# Patient Record
Sex: FEMALE | Race: WHITE | NOT HISPANIC OR LATINO | URBAN - METROPOLITAN AREA
[De-identification: names, ages, dates, MRNs, and addresses within clinical notes are randomized per-mention and may not be internally consistent; named-entity substitution may affect disease eponyms.]

---

## 2015-04-22 RX ORDER — ATORVASTATIN CALCIUM 80 MG/1
1 TABLET, FILM COATED ORAL
Qty: 0 | Refills: 0 | DISCHARGE
Start: 2015-04-22

## 2015-11-20 RX ORDER — METFORMIN HYDROCHLORIDE 850 MG/1
2 TABLET ORAL
Qty: 0 | Refills: 0 | COMMUNITY
Start: 2015-11-20

## 2017-02-15 ENCOUNTER — OUTPATIENT (OUTPATIENT)
Dept: OUTPATIENT SERVICES | Facility: HOSPITAL | Age: 67
LOS: 1 days | End: 2017-02-15
Payer: COMMERCIAL

## 2017-02-15 DIAGNOSIS — Z98.49 CATARACT EXTRACTION STATUS, UNSPECIFIED EYE: Chronic | ICD-10-CM

## 2017-02-15 DIAGNOSIS — Z90.49 ACQUIRED ABSENCE OF OTHER SPECIFIED PARTS OF DIGESTIVE TRACT: Chronic | ICD-10-CM

## 2017-02-15 DIAGNOSIS — Z98.89 OTHER SPECIFIED POSTPROCEDURAL STATES: Chronic | ICD-10-CM

## 2017-02-15 DIAGNOSIS — M54.5 LOW BACK PAIN: ICD-10-CM

## 2017-07-28 PROCEDURE — 97140 MANUAL THERAPY 1/> REGIONS: CPT

## 2017-07-28 PROCEDURE — 97010 HOT OR COLD PACKS THERAPY: CPT

## 2017-07-28 PROCEDURE — 97110 THERAPEUTIC EXERCISES: CPT

## 2017-08-01 ENCOUNTER — OUTPATIENT (OUTPATIENT)
Dept: OUTPATIENT SERVICES | Facility: HOSPITAL | Age: 67
LOS: 1 days | End: 2017-08-01
Payer: COMMERCIAL

## 2017-08-01 DIAGNOSIS — Z98.89 OTHER SPECIFIED POSTPROCEDURAL STATES: Chronic | ICD-10-CM

## 2017-08-01 DIAGNOSIS — Z98.49 CATARACT EXTRACTION STATUS, UNSPECIFIED EYE: Chronic | ICD-10-CM

## 2017-08-01 DIAGNOSIS — Z90.49 ACQUIRED ABSENCE OF OTHER SPECIFIED PARTS OF DIGESTIVE TRACT: Chronic | ICD-10-CM

## 2017-08-15 PROCEDURE — 97010 HOT OR COLD PACKS THERAPY: CPT

## 2017-08-15 PROCEDURE — 97140 MANUAL THERAPY 1/> REGIONS: CPT

## 2017-08-22 DIAGNOSIS — M54.5 LOW BACK PAIN: ICD-10-CM

## 2017-12-04 ENCOUNTER — OUTPATIENT (OUTPATIENT)
Dept: OUTPATIENT SERVICES | Facility: HOSPITAL | Age: 67
LOS: 1 days | Discharge: HOME | End: 2017-12-04
Payer: MEDICARE

## 2017-12-04 DIAGNOSIS — Z90.49 ACQUIRED ABSENCE OF OTHER SPECIFIED PARTS OF DIGESTIVE TRACT: Chronic | ICD-10-CM

## 2017-12-04 DIAGNOSIS — Z98.49 CATARACT EXTRACTION STATUS, UNSPECIFIED EYE: Chronic | ICD-10-CM

## 2017-12-04 DIAGNOSIS — Z98.89 OTHER SPECIFIED POSTPROCEDURAL STATES: Chronic | ICD-10-CM

## 2017-12-04 DIAGNOSIS — H26.491 OTHER SECONDARY CATARACT, RIGHT EYE: ICD-10-CM

## 2017-12-04 PROCEDURE — 66821 AFTER CATARACT LASER SURGERY: CPT | Mod: RT

## 2018-12-04 ENCOUNTER — APPOINTMENT (OUTPATIENT)
Dept: MAMMOGRAPHY | Facility: CLINIC | Age: 68
End: 2018-12-04
Payer: MEDICARE

## 2018-12-04 ENCOUNTER — OUTPATIENT (OUTPATIENT)
Dept: OUTPATIENT SERVICES | Facility: HOSPITAL | Age: 68
LOS: 1 days | End: 2018-12-04
Payer: MEDICARE

## 2018-12-04 ENCOUNTER — APPOINTMENT (OUTPATIENT)
Dept: RADIOLOGY | Facility: CLINIC | Age: 68
End: 2018-12-04
Payer: MEDICARE

## 2018-12-04 DIAGNOSIS — Z12.31 ENCOUNTER FOR SCREENING MAMMOGRAM FOR MALIGNANT NEOPLASM OF BREAST: ICD-10-CM

## 2018-12-04 DIAGNOSIS — Z98.49 CATARACT EXTRACTION STATUS, UNSPECIFIED EYE: Chronic | ICD-10-CM

## 2018-12-04 DIAGNOSIS — Z90.49 ACQUIRED ABSENCE OF OTHER SPECIFIED PARTS OF DIGESTIVE TRACT: Chronic | ICD-10-CM

## 2018-12-04 DIAGNOSIS — Z98.89 OTHER SPECIFIED POSTPROCEDURAL STATES: Chronic | ICD-10-CM

## 2018-12-04 PROCEDURE — 77063 BREAST TOMOSYNTHESIS BI: CPT | Mod: 26

## 2018-12-04 PROCEDURE — 77080 DXA BONE DENSITY AXIAL: CPT | Mod: 26

## 2018-12-04 PROCEDURE — 77067 SCR MAMMO BI INCL CAD: CPT | Mod: 26

## 2018-12-04 PROCEDURE — 77080 DXA BONE DENSITY AXIAL: CPT

## 2018-12-04 PROCEDURE — 77067 SCR MAMMO BI INCL CAD: CPT

## 2018-12-04 PROCEDURE — 77063 BREAST TOMOSYNTHESIS BI: CPT

## 2019-02-01 ENCOUNTER — OUTPATIENT (OUTPATIENT)
Dept: OUTPATIENT SERVICES | Facility: HOSPITAL | Age: 69
LOS: 1 days | End: 2019-02-01
Payer: MEDICARE

## 2019-02-01 VITALS
WEIGHT: 136.91 LBS | SYSTOLIC BLOOD PRESSURE: 169 MMHG | TEMPERATURE: 99 F | DIASTOLIC BLOOD PRESSURE: 81 MMHG | RESPIRATION RATE: 18 BRPM | HEART RATE: 90 BPM | HEIGHT: 61 IN | OXYGEN SATURATION: 97 %

## 2019-02-01 DIAGNOSIS — Z98.89 OTHER SPECIFIED POSTPROCEDURAL STATES: Chronic | ICD-10-CM

## 2019-02-01 DIAGNOSIS — Z90.49 ACQUIRED ABSENCE OF OTHER SPECIFIED PARTS OF DIGESTIVE TRACT: Chronic | ICD-10-CM

## 2019-02-01 DIAGNOSIS — Z98.49 CATARACT EXTRACTION STATUS, UNSPECIFIED EYE: Chronic | ICD-10-CM

## 2019-02-01 DIAGNOSIS — Z01.810 ENCOUNTER FOR PREPROCEDURAL CARDIOVASCULAR EXAMINATION: ICD-10-CM

## 2019-02-01 LAB
ANION GAP SERPL CALC-SCNC: 14 MMOL/L — SIGNIFICANT CHANGE UP (ref 5–17)
APTT BLD: 32.8 SEC — SIGNIFICANT CHANGE UP (ref 27.5–36.3)
BASOPHILS # BLD AUTO: 0 K/UL — SIGNIFICANT CHANGE UP (ref 0–0.2)
BASOPHILS NFR BLD AUTO: 0.2 % — SIGNIFICANT CHANGE UP (ref 0–2)
BUN SERPL-MCNC: 18 MG/DL — SIGNIFICANT CHANGE UP (ref 8–20)
CALCIUM SERPL-MCNC: 9.6 MG/DL — SIGNIFICANT CHANGE UP (ref 8.6–10.2)
CHLORIDE SERPL-SCNC: 100 MMOL/L — SIGNIFICANT CHANGE UP (ref 98–107)
CO2 SERPL-SCNC: 28 MMOL/L — SIGNIFICANT CHANGE UP (ref 22–29)
CREAT SERPL-MCNC: 0.85 MG/DL — SIGNIFICANT CHANGE UP (ref 0.5–1.3)
EOSINOPHIL # BLD AUTO: 0.1 K/UL — SIGNIFICANT CHANGE UP (ref 0–0.5)
EOSINOPHIL NFR BLD AUTO: 1.7 % — SIGNIFICANT CHANGE UP (ref 0–6)
GLUCOSE SERPL-MCNC: 204 MG/DL — HIGH (ref 70–115)
HCT VFR BLD CALC: 32.6 % — LOW (ref 37–47)
HGB BLD-MCNC: 10.9 G/DL — LOW (ref 12–16)
INR BLD: 0.89 RATIO — SIGNIFICANT CHANGE UP (ref 0.88–1.16)
LYMPHOCYTES # BLD AUTO: 2.2 K/UL — SIGNIFICANT CHANGE UP (ref 1–4.8)
LYMPHOCYTES # BLD AUTO: 33.2 % — SIGNIFICANT CHANGE UP (ref 20–55)
MCHC RBC-ENTMCNC: 30 PG — SIGNIFICANT CHANGE UP (ref 27–31)
MCHC RBC-ENTMCNC: 33.4 G/DL — SIGNIFICANT CHANGE UP (ref 32–36)
MCV RBC AUTO: 89.8 FL — SIGNIFICANT CHANGE UP (ref 81–99)
MONOCYTES # BLD AUTO: 0.6 K/UL — SIGNIFICANT CHANGE UP (ref 0–0.8)
MONOCYTES NFR BLD AUTO: 9.6 % — SIGNIFICANT CHANGE UP (ref 3–10)
NEUTROPHILS # BLD AUTO: 3.7 K/UL — SIGNIFICANT CHANGE UP (ref 1.8–8)
NEUTROPHILS NFR BLD AUTO: 55.1 % — SIGNIFICANT CHANGE UP (ref 37–73)
PLATELET # BLD AUTO: 254 K/UL — SIGNIFICANT CHANGE UP (ref 150–400)
POTASSIUM SERPL-MCNC: 3.9 MMOL/L — SIGNIFICANT CHANGE UP (ref 3.5–5.3)
POTASSIUM SERPL-SCNC: 3.9 MMOL/L — SIGNIFICANT CHANGE UP (ref 3.5–5.3)
PROTHROM AB SERPL-ACNC: 10.2 SEC — SIGNIFICANT CHANGE UP (ref 10–12.9)
RBC # BLD: 3.63 M/UL — LOW (ref 4.4–5.2)
RBC # FLD: 12.4 % — SIGNIFICANT CHANGE UP (ref 11–15.6)
SODIUM SERPL-SCNC: 142 MMOL/L — SIGNIFICANT CHANGE UP (ref 135–145)
WBC # BLD: 6.6 K/UL — SIGNIFICANT CHANGE UP (ref 4.8–10.8)
WBC # FLD AUTO: 6.6 K/UL — SIGNIFICANT CHANGE UP (ref 4.8–10.8)

## 2019-02-01 PROCEDURE — 36415 COLL VENOUS BLD VENIPUNCTURE: CPT

## 2019-02-01 PROCEDURE — 85610 PROTHROMBIN TIME: CPT

## 2019-02-01 PROCEDURE — 93005 ELECTROCARDIOGRAM TRACING: CPT

## 2019-02-01 PROCEDURE — 93010 ELECTROCARDIOGRAM REPORT: CPT

## 2019-02-01 PROCEDURE — G0463: CPT

## 2019-02-01 PROCEDURE — 85730 THROMBOPLASTIN TIME PARTIAL: CPT

## 2019-02-01 PROCEDURE — 85027 COMPLETE CBC AUTOMATED: CPT

## 2019-02-01 PROCEDURE — 80048 BASIC METABOLIC PNL TOTAL CA: CPT

## 2019-02-01 NOTE — ASU PATIENT PROFILE, ADULT - PSH
S/P appendectomy    S/P cholecystectomy    S/P tonsillectomy    Status post cataract extraction  left eye 8/3/2015

## 2019-02-01 NOTE — H&P PST ADULT - HISTORY OF PRESENT ILLNESS
This is a 67 y/o female with h/o DM, HPL, HTN, medication induced pancreatitis with recent new RBBB on EKG and sent for cardiac work up by PMD.  She denies chest pain, SOB.  Her NST revealed normal myocardial perfusion imaging but showed ischemia on the EKG in the apical, inferior, and lateral leads during stress and recovery.  EF = 71%.  As a result, patient presents today for PST for LHC to r/o obstructive CAD.      TTE (11/7/2018): Normal LV EF 60-65%; mild to mod MR; mild TR    ASA 2  Mallampati 2  GFR 70  Bleeding risk 3.4%

## 2019-02-01 NOTE — H&P PST ADULT - PMH
Diabetes    Endogenous hyperlipemia    Herniated lumbar intervertebral disc    HTN (hypertension)    Pancreatitis

## 2019-02-07 ENCOUNTER — TRANSCRIPTION ENCOUNTER (OUTPATIENT)
Age: 69
End: 2019-02-07

## 2019-02-07 ENCOUNTER — OUTPATIENT (OUTPATIENT)
Dept: OUTPATIENT SERVICES | Facility: HOSPITAL | Age: 69
LOS: 1 days | End: 2019-02-07
Payer: MEDICARE

## 2019-02-07 VITALS — DIASTOLIC BLOOD PRESSURE: 79 MMHG | RESPIRATION RATE: 18 BRPM | HEART RATE: 70 BPM | SYSTOLIC BLOOD PRESSURE: 176 MMHG

## 2019-02-07 VITALS
HEART RATE: 69 BPM | SYSTOLIC BLOOD PRESSURE: 186 MMHG | TEMPERATURE: 98 F | OXYGEN SATURATION: 99 % | DIASTOLIC BLOOD PRESSURE: 91 MMHG | RESPIRATION RATE: 18 BRPM

## 2019-02-07 DIAGNOSIS — Z98.49 CATARACT EXTRACTION STATUS, UNSPECIFIED EYE: Chronic | ICD-10-CM

## 2019-02-07 DIAGNOSIS — Z90.49 ACQUIRED ABSENCE OF OTHER SPECIFIED PARTS OF DIGESTIVE TRACT: Chronic | ICD-10-CM

## 2019-02-07 DIAGNOSIS — Z01.810 ENCOUNTER FOR PREPROCEDURAL CARDIOVASCULAR EXAMINATION: ICD-10-CM

## 2019-02-07 DIAGNOSIS — Z98.89 OTHER SPECIFIED POSTPROCEDURAL STATES: Chronic | ICD-10-CM

## 2019-02-07 DIAGNOSIS — R07.9 CHEST PAIN, UNSPECIFIED: ICD-10-CM

## 2019-02-07 LAB — GLUCOSE BLDC GLUCOMTR-MCNC: 249 MG/DL — HIGH (ref 70–99)

## 2019-02-07 PROCEDURE — 93458 L HRT ARTERY/VENTRICLE ANGIO: CPT

## 2019-02-07 PROCEDURE — C1894: CPT

## 2019-02-07 PROCEDURE — C1769: CPT

## 2019-02-07 PROCEDURE — 82962 GLUCOSE BLOOD TEST: CPT

## 2019-02-07 PROCEDURE — C1887: CPT

## 2019-02-07 PROCEDURE — 99153 MOD SED SAME PHYS/QHP EA: CPT

## 2019-02-07 PROCEDURE — 99152 MOD SED SAME PHYS/QHP 5/>YRS: CPT

## 2019-02-07 PROCEDURE — C1760: CPT

## 2019-02-07 RX ORDER — SODIUM CHLORIDE 9 MG/ML
1000 INJECTION INTRAMUSCULAR; INTRAVENOUS; SUBCUTANEOUS
Qty: 0 | Refills: 0 | Status: DISCONTINUED | OUTPATIENT
Start: 2019-02-07 | End: 2019-02-22

## 2019-02-07 RX ADMIN — SODIUM CHLORIDE 30 MILLILITER(S): 9 INJECTION INTRAMUSCULAR; INTRAVENOUS; SUBCUTANEOUS at 18:00

## 2019-02-07 RX ADMIN — SODIUM CHLORIDE 30 MILLILITER(S): 9 INJECTION INTRAMUSCULAR; INTRAVENOUS; SUBCUTANEOUS at 16:20

## 2019-02-07 RX ADMIN — SODIUM CHLORIDE 30 MILLILITER(S): 9 INJECTION INTRAMUSCULAR; INTRAVENOUS; SUBCUTANEOUS at 17:14

## 2019-02-07 NOTE — DISCHARGE NOTE ADULT - CARE PLAN
Principal Discharge DX:	Abnormal stress test  Goal:	ADL without pain  Assessment and plan of treatment:	No heavy lifting, driving, sex, tub baths, swimming, or any activity that submerges the lower half of the body in water for 48 hours.  Limited walking and stairs for 48 hours.    Change the bandaid after 24 hours and every 24 hours after that.  Keep the puncture site dry and covered with a bandaid until a scab forms.    Observe the site frequently.  If bleeding or a large lump (the size of a golf ball or bigger) occurs lie flat, apply continuous direct pressure just above the puncture site for at least 10 minutes, and notify your physician immediately.  If the bleeding cannot be controlled, call 911 immediately for assistance.  Notify your physician of pain, swelling or any drainage.    Notify your physician immediately if coldness, numbness, discoloration or pain in your foot occurs.

## 2019-02-07 NOTE — DISCHARGE NOTE ADULT - HOSPITAL COURSE
This is a 67 y/o female with h/o DM, HPL, HTN, medication induced pancreatitis with recent new RBBB on EKG and sent for cardiac work up by PMD.  She denies chest pain, SOB.  Her NST revealed normal myocardial perfusion imaging but showed ischemia on the EKG in the apical, inferior, and lateral leads during stress and recovery.  EF = 71%.  As a result, patient presents today for PST for C to r/o obstructive CAD.      TTE (11/7/2018): Normal LV EF 60-65%; mild to mod MR; mild TR  S/P cath  Non obstructive CAD  Plan d/c home  Follow up with outpatient in 7-10 days  Continue medications as prior.

## 2019-02-07 NOTE — DISCHARGE NOTE ADULT - PATIENT PORTAL LINK FT
You can access the [email or web address], offered by [facility], by registering with the following website: [email or web address]

## 2019-02-07 NOTE — DISCHARGE NOTE ADULT - MEDICATION SUMMARY - MEDICATIONS TO TAKE
I will START or STAY ON the medications listed below when I get home from the hospital:    aspirin 81 mg oral tablet  -- 1 tab(s) by mouth once a day  -- Indication: For Chest pain    oxyCODONE 5 mg oral tablet  -- 1 tab(s) by mouth 2 times a day, As Needed  -- Indication: For pain    ramipril 5 mg oral capsule  -- 1 cap(s) by mouth once a day  -- Indication: For HTN    metFORMIN 500 mg oral tablet  -- 2 tab(s) by mouth 2 times a day (with meals)  -- Indication: For DM resume in 2 days    glipiZIDE 5 mg oral tablet, extended release  -- 1 tab(s) by mouth 2 times a day  -- Indication: For DM    atorvastatin 20 mg oral tablet  -- 1 tab(s) by mouth once a day  -- Indication: For HLD    ALPRAZolam 0.25 mg oral tablet  -- 1 tab(s) by mouth once a day (at bedtime), As Needed  -- Indication: For anxiety    Toprol-XL  -- 75 milligram(s) by mouth once a day  -- Indication: For HTN    Restasis 0.05% ophthalmic emulsion  -- 1 drop(s) to each affected eye every 12 hours  -- Indication: For EYE gtts

## 2019-02-07 NOTE — DISCHARGE NOTE ADULT - CARE PROVIDER_API CALL
Misha Mckeon)  Cardiovascular Disease; Interventional Cardiology  [address] [place] [address]  Phone: [phone]  Fax: [phone]  Follow Up Time:

## 2019-02-07 NOTE — DISCHARGE NOTE ADULT - NS AS ACTIVITY OBS
No Heavy lifting/straining/Sex allowed/Stairs allowed/Walking-Indoors allowed/Showering allowed/Walking-Outdoors allowed/Do not make important decisions/Do not drive or operate machinery

## 2019-02-07 NOTE — DISCHARGE NOTE ADULT - PLAN OF CARE
ADL without pain No heavy lifting, driving, sex, tub baths, swimming, or any activity that submerges the lower half of the body in water for 48 hours.  Limited walking and stairs for 48 hours.    Change the bandaid after 24 hours and every 24 hours after that.  Keep the puncture site dry and covered with a bandaid until a scab forms.    Observe the site frequently.  If bleeding or a large lump (the size of a golf ball or bigger) occurs lie flat, apply continuous direct pressure just above the puncture site for at least 10 minutes, and notify your physician immediately.  If the bleeding cannot be controlled, call 911 immediately for assistance.  Notify your physician of pain, swelling or any drainage.    Notify your physician immediately if coldness, numbness, discoloration or pain in your foot occurs.

## 2019-04-10 ENCOUNTER — INPATIENT (INPATIENT)
Facility: HOSPITAL | Age: 69
LOS: 4 days | Discharge: HOME | DRG: 871 | End: 2019-04-15
Attending: SURGERY | Admitting: SURGERY
Payer: MEDICARE

## 2019-04-10 VITALS
TEMPERATURE: 98 F | HEIGHT: 61 IN | SYSTOLIC BLOOD PRESSURE: 177 MMHG | DIASTOLIC BLOOD PRESSURE: 68 MMHG | RESPIRATION RATE: 22 BRPM | WEIGHT: 130.07 LBS | OXYGEN SATURATION: 99 % | HEART RATE: 104 BPM

## 2019-04-10 DIAGNOSIS — Z98.49 CATARACT EXTRACTION STATUS, UNSPECIFIED EYE: Chronic | ICD-10-CM

## 2019-04-10 DIAGNOSIS — A41.9 SEPSIS, UNSPECIFIED ORGANISM: ICD-10-CM

## 2019-04-10 DIAGNOSIS — Z98.89 OTHER SPECIFIED POSTPROCEDURAL STATES: Chronic | ICD-10-CM

## 2019-04-10 DIAGNOSIS — Z90.49 ACQUIRED ABSENCE OF OTHER SPECIFIED PARTS OF DIGESTIVE TRACT: Chronic | ICD-10-CM

## 2019-04-10 PROBLEM — K85.90 ACUTE PANCREATITIS WITHOUT NECROSIS OR INFECTION, UNSPECIFIED: Chronic | Status: ACTIVE | Noted: 2019-02-01

## 2019-04-10 LAB
ALBUMIN SERPL ELPH-MCNC: 4.3 G/DL — SIGNIFICANT CHANGE UP (ref 3.3–5.2)
ALP SERPL-CCNC: 158 U/L — HIGH (ref 40–120)
ALT FLD-CCNC: 156 U/L — HIGH
ANION GAP SERPL CALC-SCNC: 19 MMOL/L — HIGH (ref 5–17)
APPEARANCE UR: CLEAR — SIGNIFICANT CHANGE UP
AST SERPL-CCNC: 260 U/L — HIGH
BACTERIA # UR AUTO: ABNORMAL
BILIRUB SERPL-MCNC: 1.2 MG/DL — SIGNIFICANT CHANGE UP (ref 0.4–2)
BILIRUB UR-MCNC: NEGATIVE — SIGNIFICANT CHANGE UP
BLD GP AB SCN SERPL QL: SIGNIFICANT CHANGE UP
BUN SERPL-MCNC: 23 MG/DL — HIGH (ref 8–20)
CALCIUM SERPL-MCNC: 10.2 MG/DL — SIGNIFICANT CHANGE UP (ref 8.6–10.2)
CHLORIDE SERPL-SCNC: 100 MMOL/L — SIGNIFICANT CHANGE UP (ref 98–107)
CO2 SERPL-SCNC: 24 MMOL/L — SIGNIFICANT CHANGE UP (ref 22–29)
COLOR SPEC: YELLOW — SIGNIFICANT CHANGE UP
CREAT SERPL-MCNC: 0.89 MG/DL — SIGNIFICANT CHANGE UP (ref 0.5–1.3)
DIFF PNL FLD: ABNORMAL
EPI CELLS # UR: NEGATIVE — SIGNIFICANT CHANGE UP
GLUCOSE BLDC GLUCOMTR-MCNC: 180 MG/DL — HIGH (ref 70–99)
GLUCOSE SERPL-MCNC: 222 MG/DL — HIGH (ref 70–115)
GLUCOSE UR QL: 100 MG/DL
HCT VFR BLD CALC: 34.3 % — LOW (ref 37–47)
HGB BLD-MCNC: 11.6 G/DL — LOW (ref 12–16)
KETONES UR-MCNC: ABNORMAL
LACTATE BLDV-MCNC: 1 MMOL/L — SIGNIFICANT CHANGE UP (ref 0.5–2)
LACTATE BLDV-MCNC: 2.2 MMOL/L — HIGH (ref 0.5–2)
LEUKOCYTE ESTERASE UR-ACNC: NEGATIVE — SIGNIFICANT CHANGE UP
LIDOCAIN IGE QN: 2859 U/L — HIGH (ref 22–51)
LYMPHOCYTES # BLD AUTO: 5 % — LOW (ref 20–55)
MCHC RBC-ENTMCNC: 30.1 PG — SIGNIFICANT CHANGE UP (ref 27–31)
MCHC RBC-ENTMCNC: 33.8 G/DL — SIGNIFICANT CHANGE UP (ref 32–36)
MCV RBC AUTO: 88.9 FL — SIGNIFICANT CHANGE UP (ref 81–99)
METAMYELOCYTES # FLD: 1 % — HIGH (ref 0–0)
MONOCYTES NFR BLD AUTO: 7 % — SIGNIFICANT CHANGE UP (ref 3–10)
MYELOCYTES NFR BLD: 1 % — HIGH (ref 0–0)
NEUTROPHILS NFR BLD AUTO: 84 % — HIGH (ref 37–73)
NITRITE UR-MCNC: NEGATIVE — SIGNIFICANT CHANGE UP
PH UR: 5 — SIGNIFICANT CHANGE UP (ref 5–8)
PLAT MORPH BLD: NORMAL — SIGNIFICANT CHANGE UP
PLATELET # BLD AUTO: 271 K/UL — SIGNIFICANT CHANGE UP (ref 150–400)
POTASSIUM SERPL-MCNC: 3.9 MMOL/L — SIGNIFICANT CHANGE UP (ref 3.5–5.3)
POTASSIUM SERPL-SCNC: 3.9 MMOL/L — SIGNIFICANT CHANGE UP (ref 3.5–5.3)
PROT SERPL-MCNC: 7.5 G/DL — SIGNIFICANT CHANGE UP (ref 6.6–8.7)
PROT UR-MCNC: 30 MG/DL
RBC # BLD: 3.86 M/UL — LOW (ref 4.4–5.2)
RBC # FLD: 12.5 % — SIGNIFICANT CHANGE UP (ref 11–15.6)
RBC BLD AUTO: NORMAL — SIGNIFICANT CHANGE UP
RBC CASTS # UR COMP ASSIST: ABNORMAL /HPF (ref 0–4)
SODIUM SERPL-SCNC: 143 MMOL/L — SIGNIFICANT CHANGE UP (ref 135–145)
SP GR SPEC: 1.01 — SIGNIFICANT CHANGE UP (ref 1.01–1.02)
TYPE + AB SCN PNL BLD: SIGNIFICANT CHANGE UP
UROBILINOGEN FLD QL: NEGATIVE MG/DL — SIGNIFICANT CHANGE UP
VARIANT LYMPHS # BLD: 2 % — SIGNIFICANT CHANGE UP (ref 0–6)
WBC # BLD: 15.8 K/UL — HIGH (ref 4.8–10.8)
WBC # FLD AUTO: 15.8 K/UL — HIGH (ref 4.8–10.8)
WBC UR QL: SIGNIFICANT CHANGE UP

## 2019-04-10 PROCEDURE — 99221 1ST HOSP IP/OBS SF/LOW 40: CPT

## 2019-04-10 PROCEDURE — 74177 CT ABD & PELVIS W/CONTRAST: CPT | Mod: 26

## 2019-04-10 PROCEDURE — 99291 CRITICAL CARE FIRST HOUR: CPT

## 2019-04-10 PROCEDURE — 93010 ELECTROCARDIOGRAM REPORT: CPT

## 2019-04-10 PROCEDURE — 71045 X-RAY EXAM CHEST 1 VIEW: CPT | Mod: 26

## 2019-04-10 RX ORDER — HYDROMORPHONE HYDROCHLORIDE 2 MG/ML
0.5 INJECTION INTRAMUSCULAR; INTRAVENOUS; SUBCUTANEOUS EVERY 4 HOURS
Qty: 0 | Refills: 0 | Status: DISCONTINUED | OUTPATIENT
Start: 2019-04-10 | End: 2019-04-11

## 2019-04-10 RX ORDER — METOPROLOL TARTRATE 50 MG
25 TABLET ORAL
Qty: 0 | Refills: 0 | Status: DISCONTINUED | OUTPATIENT
Start: 2019-04-10 | End: 2019-04-15

## 2019-04-10 RX ORDER — LISINOPRIL 2.5 MG/1
20 TABLET ORAL DAILY
Qty: 0 | Refills: 0 | Status: DISCONTINUED | OUTPATIENT
Start: 2019-04-10 | End: 2019-04-10

## 2019-04-10 RX ORDER — SODIUM CHLORIDE 9 MG/ML
1000 INJECTION INTRAMUSCULAR; INTRAVENOUS; SUBCUTANEOUS ONCE
Qty: 0 | Refills: 0 | Status: COMPLETED | OUTPATIENT
Start: 2019-04-10 | End: 2019-04-10

## 2019-04-10 RX ORDER — SODIUM CHLORIDE 9 MG/ML
1000 INJECTION INTRAMUSCULAR; INTRAVENOUS; SUBCUTANEOUS
Qty: 0 | Refills: 0 | Status: DISCONTINUED | OUTPATIENT
Start: 2019-04-10 | End: 2019-04-10

## 2019-04-10 RX ORDER — GLUCAGON INJECTION, SOLUTION 0.5 MG/.1ML
1 INJECTION, SOLUTION SUBCUTANEOUS ONCE
Qty: 0 | Refills: 0 | Status: DISCONTINUED | OUTPATIENT
Start: 2019-04-10 | End: 2019-04-15

## 2019-04-10 RX ORDER — SODIUM CHLORIDE 9 MG/ML
1000 INJECTION, SOLUTION INTRAVENOUS
Qty: 0 | Refills: 0 | Status: DISCONTINUED | OUTPATIENT
Start: 2019-04-10 | End: 2019-04-15

## 2019-04-10 RX ORDER — INFLUENZA VIRUS VACCINE 15; 15; 15; 15 UG/.5ML; UG/.5ML; UG/.5ML; UG/.5ML
0.5 SUSPENSION INTRAMUSCULAR ONCE
Qty: 0 | Refills: 0 | Status: DISCONTINUED | OUTPATIENT
Start: 2019-04-10 | End: 2019-04-15

## 2019-04-10 RX ORDER — DEXTROSE 50 % IN WATER 50 %
15 SYRINGE (ML) INTRAVENOUS ONCE
Qty: 0 | Refills: 0 | Status: DISCONTINUED | OUTPATIENT
Start: 2019-04-10 | End: 2019-04-15

## 2019-04-10 RX ORDER — MORPHINE SULFATE 50 MG/1
4 CAPSULE, EXTENDED RELEASE ORAL ONCE
Qty: 0 | Refills: 0 | Status: DISCONTINUED | OUTPATIENT
Start: 2019-04-10 | End: 2019-04-10

## 2019-04-10 RX ORDER — ACETAMINOPHEN 500 MG
1000 TABLET ORAL ONCE
Qty: 0 | Refills: 0 | Status: COMPLETED | OUTPATIENT
Start: 2019-04-10 | End: 2019-04-10

## 2019-04-10 RX ORDER — METOPROLOL TARTRATE 50 MG
50 TABLET ORAL DAILY
Qty: 0 | Refills: 0 | Status: DISCONTINUED | OUTPATIENT
Start: 2019-04-10 | End: 2019-04-10

## 2019-04-10 RX ORDER — DEXTROSE 50 % IN WATER 50 %
12.5 SYRINGE (ML) INTRAVENOUS ONCE
Qty: 0 | Refills: 0 | Status: DISCONTINUED | OUTPATIENT
Start: 2019-04-10 | End: 2019-04-15

## 2019-04-10 RX ORDER — DIPHENHYDRAMINE HCL 50 MG
25 CAPSULE ORAL ONCE
Qty: 0 | Refills: 0 | Status: COMPLETED | OUTPATIENT
Start: 2019-04-10 | End: 2019-04-10

## 2019-04-10 RX ORDER — SODIUM CHLORIDE 9 MG/ML
3 INJECTION INTRAMUSCULAR; INTRAVENOUS; SUBCUTANEOUS ONCE
Qty: 0 | Refills: 0 | Status: COMPLETED | OUTPATIENT
Start: 2019-04-10 | End: 2019-04-10

## 2019-04-10 RX ORDER — SODIUM CHLORIDE 9 MG/ML
1000 INJECTION, SOLUTION INTRAVENOUS
Qty: 0 | Refills: 0 | Status: DISCONTINUED | OUTPATIENT
Start: 2019-04-10 | End: 2019-04-12

## 2019-04-10 RX ORDER — ATORVASTATIN CALCIUM 80 MG/1
10 TABLET, FILM COATED ORAL AT BEDTIME
Qty: 0 | Refills: 0 | Status: DISCONTINUED | OUTPATIENT
Start: 2019-04-10 | End: 2019-04-15

## 2019-04-10 RX ORDER — HYDROMORPHONE HYDROCHLORIDE 2 MG/ML
1 INJECTION INTRAMUSCULAR; INTRAVENOUS; SUBCUTANEOUS EVERY 6 HOURS
Qty: 0 | Refills: 0 | Status: DISCONTINUED | OUTPATIENT
Start: 2019-04-10 | End: 2019-04-11

## 2019-04-10 RX ORDER — FENTANYL CITRATE 50 UG/ML
25 INJECTION INTRAVENOUS ONCE
Qty: 0 | Refills: 0 | Status: DISCONTINUED | OUTPATIENT
Start: 2019-04-10 | End: 2019-04-10

## 2019-04-10 RX ORDER — DEXTROSE 50 % IN WATER 50 %
25 SYRINGE (ML) INTRAVENOUS ONCE
Qty: 0 | Refills: 0 | Status: DISCONTINUED | OUTPATIENT
Start: 2019-04-10 | End: 2019-04-15

## 2019-04-10 RX ORDER — ENOXAPARIN SODIUM 100 MG/ML
40 INJECTION SUBCUTANEOUS DAILY
Qty: 0 | Refills: 0 | Status: DISCONTINUED | OUTPATIENT
Start: 2019-04-10 | End: 2019-04-15

## 2019-04-10 RX ORDER — ERTAPENEM SODIUM 1 G/1
1000 INJECTION, POWDER, LYOPHILIZED, FOR SOLUTION INTRAMUSCULAR; INTRAVENOUS ONCE
Qty: 0 | Refills: 0 | Status: COMPLETED | OUTPATIENT
Start: 2019-04-10 | End: 2019-04-10

## 2019-04-10 RX ORDER — INSULIN LISPRO 100/ML
VIAL (ML) SUBCUTANEOUS EVERY 6 HOURS
Qty: 0 | Refills: 0 | Status: DISCONTINUED | OUTPATIENT
Start: 2019-04-10 | End: 2019-04-15

## 2019-04-10 RX ORDER — CHLORHEXIDINE GLUCONATE 213 G/1000ML
1 SOLUTION TOPICAL DAILY
Qty: 0 | Refills: 0 | Status: DISCONTINUED | OUTPATIENT
Start: 2019-04-10 | End: 2019-04-15

## 2019-04-10 RX ORDER — SODIUM CHLORIDE 9 MG/ML
2000 INJECTION INTRAMUSCULAR; INTRAVENOUS; SUBCUTANEOUS ONCE
Qty: 0 | Refills: 0 | Status: COMPLETED | OUTPATIENT
Start: 2019-04-10 | End: 2019-04-10

## 2019-04-10 RX ORDER — METOCLOPRAMIDE HCL 10 MG
10 TABLET ORAL ONCE
Qty: 0 | Refills: 0 | Status: COMPLETED | OUTPATIENT
Start: 2019-04-10 | End: 2019-04-10

## 2019-04-10 RX ADMIN — HYDROMORPHONE HYDROCHLORIDE 0.5 MILLIGRAM(S): 2 INJECTION INTRAMUSCULAR; INTRAVENOUS; SUBCUTANEOUS at 14:05

## 2019-04-10 RX ADMIN — Medication 400 MILLIGRAM(S): at 15:17

## 2019-04-10 RX ADMIN — FENTANYL CITRATE 25 MICROGRAM(S): 50 INJECTION INTRAVENOUS at 10:23

## 2019-04-10 RX ADMIN — Medication 10 MILLIGRAM(S): at 08:35

## 2019-04-10 RX ADMIN — Medication 1000 MILLIGRAM(S): at 15:32

## 2019-04-10 RX ADMIN — ERTAPENEM SODIUM 120 MILLIGRAM(S): 1 INJECTION, POWDER, LYOPHILIZED, FOR SOLUTION INTRAMUSCULAR; INTRAVENOUS at 10:05

## 2019-04-10 RX ADMIN — HYDROMORPHONE HYDROCHLORIDE 0.5 MILLIGRAM(S): 2 INJECTION INTRAMUSCULAR; INTRAVENOUS; SUBCUTANEOUS at 19:56

## 2019-04-10 RX ADMIN — FENTANYL CITRATE 25 MICROGRAM(S): 50 INJECTION INTRAVENOUS at 10:33

## 2019-04-10 RX ADMIN — MORPHINE SULFATE 4 MILLIGRAM(S): 50 CAPSULE, EXTENDED RELEASE ORAL at 07:55

## 2019-04-10 RX ADMIN — Medication 400 MILLIGRAM(S): at 09:43

## 2019-04-10 RX ADMIN — Medication 1000 MILLIGRAM(S): at 09:33

## 2019-04-10 RX ADMIN — Medication 1000 MILLIGRAM(S): at 10:32

## 2019-04-10 RX ADMIN — HYDROMORPHONE HYDROCHLORIDE 1 MILLIGRAM(S): 2 INJECTION INTRAMUSCULAR; INTRAVENOUS; SUBCUTANEOUS at 17:46

## 2019-04-10 RX ADMIN — SODIUM CHLORIDE 1000 MILLILITER(S): 9 INJECTION INTRAMUSCULAR; INTRAVENOUS; SUBCUTANEOUS at 07:55

## 2019-04-10 RX ADMIN — SODIUM CHLORIDE 3 MILLILITER(S): 9 INJECTION INTRAMUSCULAR; INTRAVENOUS; SUBCUTANEOUS at 07:59

## 2019-04-10 RX ADMIN — HYDROMORPHONE HYDROCHLORIDE 0.5 MILLIGRAM(S): 2 INJECTION INTRAMUSCULAR; INTRAVENOUS; SUBCUTANEOUS at 20:12

## 2019-04-10 RX ADMIN — Medication 25 MILLIGRAM(S): at 10:22

## 2019-04-10 RX ADMIN — HYDROMORPHONE HYDROCHLORIDE 1 MILLIGRAM(S): 2 INJECTION INTRAMUSCULAR; INTRAVENOUS; SUBCUTANEOUS at 23:32

## 2019-04-10 RX ADMIN — Medication 1: at 17:32

## 2019-04-10 RX ADMIN — HYDROMORPHONE HYDROCHLORIDE 0.5 MILLIGRAM(S): 2 INJECTION INTRAMUSCULAR; INTRAVENOUS; SUBCUTANEOUS at 13:50

## 2019-04-10 RX ADMIN — SODIUM CHLORIDE 150 MILLILITER(S): 9 INJECTION, SOLUTION INTRAVENOUS at 13:51

## 2019-04-10 RX ADMIN — ATORVASTATIN CALCIUM 10 MILLIGRAM(S): 80 TABLET, FILM COATED ORAL at 23:31

## 2019-04-10 RX ADMIN — HYDROMORPHONE HYDROCHLORIDE 1 MILLIGRAM(S): 2 INJECTION INTRAMUSCULAR; INTRAVENOUS; SUBCUTANEOUS at 17:31

## 2019-04-10 RX ADMIN — SODIUM CHLORIDE 2000 MILLILITER(S): 9 INJECTION INTRAMUSCULAR; INTRAVENOUS; SUBCUTANEOUS at 10:05

## 2019-04-10 RX ADMIN — MORPHINE SULFATE 4 MILLIGRAM(S): 50 CAPSULE, EXTENDED RELEASE ORAL at 08:33

## 2019-04-10 RX ADMIN — ENOXAPARIN SODIUM 40 MILLIGRAM(S): 100 INJECTION SUBCUTANEOUS at 17:09

## 2019-04-10 RX ADMIN — SODIUM CHLORIDE 150 MILLILITER(S): 9 INJECTION, SOLUTION INTRAVENOUS at 21:24

## 2019-04-10 RX ADMIN — ERTAPENEM SODIUM 1000 MILLIGRAM(S): 1 INJECTION, POWDER, LYOPHILIZED, FOR SOLUTION INTRAMUSCULAR; INTRAVENOUS at 11:32

## 2019-04-10 NOTE — ED PROVIDER NOTE - CLINICAL SUMMARY MEDICAL DECISION MAKING FREE TEXT BOX
PT WITH HX PANCREATITITS PRESENTS WITH ABD OMINAL PAIN AND VOMITING  + HEMATEMEISIS  FOUND TO BE FEBRILE AND TACHYCARDIC. TENDER MIDEPIGASTRIC AREA  CODE SEPSIS CALLED LABS IV IVF [facility] MEDS ANTIBIOTICS CT SCAN MICU AND SICU CONSULTS  ADMIT DR HART SICU

## 2019-04-10 NOTE — ED PROVIDER NOTE - RESPIRATORY, MLM
DISPLAY PLAN FREE TEXT DISPLAY PLAN FREE TEXT DISPLAY PLAN FREE TEXT DISPLAY PLAN FREE TEXT DISPLAY PLAN FREE TEXT DISPLAY PLAN FREE TEXT DISPLAY PLAN FREE TEXT DISPLAY PLAN FREE TEXT Breath sounds clear and equal bilaterally.

## 2019-04-10 NOTE — H&P ADULT - NSICDXPASTMEDICALHX_GEN_ALL_CORE_FT
PAST MEDICAL HISTORY:  Diabetes     Endogenous hyperlipemia     Herniated lumbar intervertebral disc     HTN (hypertension)     Pancreatitis

## 2019-04-10 NOTE — H&P ADULT - NSHPPHYSICALEXAM_GEN_ALL_CORE
Vital Signs Last 24 Hrs  T(C): 36.7 (10 Apr 2019 07:17), Max: 36.7 (10 Apr 2019 07:17)  T(F): 98.1 (10 Apr 2019 07:17), Max: 98.1 (10 Apr 2019 07:17)  HR: 110 (10 Apr 2019 09:24) (104 - 110)  BP: 155/81 (10 Apr 2019 09:24) (155/81 - 177/68)  BP(mean): --  RR: 22 (10 Apr 2019 09:24) (22 - 22)  SpO2: 94% (10 Apr 2019 09:24) (94% - 99%)    Constitutional: patient appears uncomfortable, in mild distress lying on stretcher, c/o pain  HEENT: EOMI / PERRL b/l, no active drainage or redness  Neck: No JVD  Respiratory: lungs are CTA b/l, respirations are unlabored, no accessory muscle use, no conversational dyspnea  Cardiovascular: sinus tachycardia  Gastrointestinal: Abdomen soft, mild distension, moderate epigastric TTP, no rebound tenderness / guarding  Neurological: A&O x 3, no focal deficits  Psychiatric: Normal mood, normal affect  Musculoskeletal: No joint pain, swelling or deformity; no limitation of movement          LABS: Vital Signs Last 24 Hrs  T(C): 36.7 (10 Apr 2019 07:17), Max: 36.7 (10 Apr 2019 07:17)  T(F): 98.1 (10 Apr 2019 07:17), Max: 98.1 (10 Apr 2019 07:17)  HR: 110 (10 Apr 2019 09:24) (104 - 110)  BP: 155/81 (10 Apr 2019 09:24) (155/81 - 177/68)  BP(mean): --  RR: 22 (10 Apr 2019 09:24) (22 - 22)  SpO2: 94% (10 Apr 2019 09:24) (94% - 99%)    Constitutional: patient appears uncomfortable, in mild distress lying on stretcher, c/o pain  HEENT: EOMI / PERRL b/l, no active drainage or redness  Neck: No JVD  Respiratory: lungs are CTA b/l, respirations are unlabored, no accessory muscle use, no conversational dyspnea  Cardiovascular: sinus tachycardia  Gastrointestinal: Abdomen soft, mild distension, moderate epigastric TTP, no rebound tenderness / guarding  Neurological: A&O x 3, no focal deficits  Psychiatric: Normal mood, normal affect  Musculoskeletal: No joint pain, swelling or deformity; no limitation of movement

## 2019-04-10 NOTE — H&P ADULT - ASSESSMENT
NOTE INCOMPLETE 67 y/o female 69 y/o female with 1 day of abdominal pain, nausea, and vomiting. Lab work and CT scan consistent with pancreatitis, ines 3.   - Admit to SICU under Dr. Alvarado  - NPO with aggressive IVF resuscitation  - Strict I&Os   - Pain control PRN  - Trend lipase, lfts, lactate  - Serial abd exams  - Discussed with Dr. Ibarra, Dr. Alvarado & ICU

## 2019-04-10 NOTE — ED ADULT NURSE REASSESSMENT NOTE - NS ED NURSE REASSESS COMMENT FT1
LAte entry : MD Camacho at the bedside to eval pt ,pt now have rectal temp of 102.1 , code sepsis called,  sepsis protocol initiated

## 2019-04-10 NOTE — H&P ADULT - HISTORY OF PRESENT ILLNESS
67 y/o female presents to ED complaining of approximately 1 day of worsening abdominal pain, nausea, and vomiting. Patient gave verbal consent to obtain majority of history from her  who is at bedside. Abdominal pain started yesterday morning, worsened throughout the day, mostly epigastric and sharp/stabbing in nature. Initially pain was intermittent but now it is constant. When patient vomits she states it helps to relieve the pain temporarily. Radiating to her back. Associated with nausea & vomiting.  states patient has a lot of "stomach" issues so when she did not feel well yesterday morning they thought it was just her "stomach acting up again."  estimates that she vomited 20 times in the past 24 hours. 67 y/o female presents to ED complaining of approximately 1 day of worsening abdominal pain, nausea, and vomiting. Patient gave verbal consent to obtain majority of history from her  who is at bedside. Abdominal pain started yesterday morning, worsened throughout the day, mostly epigastric and sharp/stabbing in nature. Initially pain was intermittent but now it is constant. When patient vomits she states it helps to relieve the pain temporarily. Radiating to her back. Associated with nausea & vomiting.  states patient has a lot of "stomach" issues so when she did not feel well yesterday morning they thought it was just her "stomach acting up again."  estimates that she vomited 20 times in the past 24 hours.  He states patient had medication induced pancreatitis approx. 4-5 years ago for which he thinks was caused by glipizide but he was not sure.     PMHx: pancreatitis, HTN, diabetes, RBBB, HLD, chronic back pain  PSHx: cardiac cath 2/7/2019 (negative, no intervention), cholecystectomy >10 yrs ago, appendectomy @ age 6, tonsillectomy cataract surgery  Allergies: PCN, unknown reaction  Medications: listed below  Social hx: denies any EtOH or tobacco use. Occasional opoid use for chronic back pain    PCP: Dr. Blake  Cardiologist: Dr. Mckeon 67 y/o female presents to ED complaining of approximately 1 day of worsening abdominal pain, nausea, and vomiting. Patient gave verbal consent to obtain majority of history from her  who is at bedside. Abdominal pain started yesterday morning, worsened throughout the day, mostly epigastric and sharp/stabbing in nature. Initially pain was intermittent but now it is constant. When patient vomits she states it helps to relieve the pain temporarily. Non-radiating. Associated with nausea & vomiting.  states patient has a lot of "stomach" issues so when she did not feel well yesterday morning they thought it was just her "stomach acting up again."  estimates that she vomited 20 times in the past 24 hours.  He states patient had medication induced pancreatitis approx. 4-5 years ago for which he thinks was caused by glipizide but he was not sure.     PMHx: pancreatitis, HTN, diabetes, RBBB, HLD, chronic back pain  PSHx: cardiac cath 2/7/2019 (negative, no intervention), cholecystectomy >10 yrs ago, appendectomy @ age 6, tonsillectomy cataract surgery  Allergies: PCN, unknown reaction  Medications: listed below  Social hx: denies any EtOH or tobacco use. Occasional opoid use for chronic back pain    PCP: Dr. Blake  Cardiologist: Dr. Mckeon

## 2019-04-10 NOTE — ED STATDOCS - CLINICAL SUMMARY MEDICAL DECISION MAKING FREE TEXT BOX
I, Martha Arceo, performed the initial face to face bedside interview with this patient regarding history of present illness and determined that the patient should be seen in the main ED.  The history, was documented by the scribe in my presence and I attest to the accuracy of the documentation.

## 2019-04-10 NOTE — H&P ADULT - ATTENDING COMMENTS
The patient was seen and examined  Details per the PA's H&P  This is a 68-year old woman who presents to the ED with abdominal pain  Epigastric in location.  Associated with nausea and vomiting  The patient PMH is significant for pancreatitis    Exam:  Afebrile  Abdomen is softly distended, tender in epigastrum    Labs:  WBC=15.8  CO2=24  Glucose = 222 Mg%  Lipase=2859    CT images reviewed    Impression:  Acute pancreatitis  Hypercalcemia    Plan:  Admit SICU  Physiologic support  Send PTH  DVT prophylaxis

## 2019-04-10 NOTE — ED PROVIDER NOTE - PSH
No pertinent family history in first degree relatives S/P appendectomy    S/P cholecystectomy    S/P tonsillectomy    Status post cataract extraction  left eye 8/3/2015

## 2019-04-10 NOTE — PATIENT PROFILE ADULT - EQUIPMENT CURRENTLY USED AT HOME
Have Your Spot(S) Been Treated In The Past?: has not been treated
Hpi Title: Evaluation of Skin Lesions
Additional History: Here at request of Dr. Adonis Arthur. Pt c/o lesions on face and chest. States “I would like to have these places looked at. If they are considered cosmetic and Medicare doesn’t pay, I don’t want anything done to them”. No hx of skin cancer. Not using anything on lesions.
no

## 2019-04-10 NOTE — ED PROVIDER NOTE - OBJECTIVE STATEMENT
69 YO FEMALE  C/C VOMITING AND SEVERE ABDOMINAL PAIN  SUDDEN ONSET  YESTERDAY AND WORSE TODAY  PAIN ONSET RAPID, NOTHING MAKES IT BETTER, IT IS CONSTANT AND NON RADIATING  IT IS SEVERE AND ASSOCIATED WITH VOMITING + BLOOD IN EMESIS  HX PANCREATITIS, DM HTN

## 2019-04-10 NOTE — H&P ADULT - NSICDXPASTSURGICALHX_GEN_ALL_CORE_FT
PAST SURGICAL HISTORY:  S/P appendectomy     S/P cholecystectomy     S/P tonsillectomy     Status post cataract extraction left eye 8/3/2015

## 2019-04-10 NOTE — ED PROVIDER NOTE - CARE PLAN
Principal Discharge DX:	Sepsis, due to unspecified organism  Secondary Diagnosis:	Drug-induced acute pancreatitis, unspecified complication status

## 2019-04-10 NOTE — PATIENT PROFILE ADULT - FALL HARM RISK TYPE OF ASSESSMENT
How Severe Is Your Skin Lesion?: mild Is This A New Presentation, Or A Follow-Up?: Skin Lesions admission

## 2019-04-10 NOTE — H&P ADULT - NSHPLABSRESULTS_GEN_ALL_CORE
11.6   15.8  )-----------( 271      ( 10 Apr 2019 08:20 )             34.3     04-10    143  |  100  |  23.0<H>  ----------------------------<  222<H>  3.9   |  24.0  |  0.89    Ca    10.2      10 Apr 2019 08:20    TPro  7.5  /  Alb  4.3  /  TBili  1.2  /  DBili  x   /  AST  260<H>  /  ALT  156<H>  /  AlkPhos  158<H>  04-10 11.6   15.8  )-----------( 271      ( 10 Apr 2019 08:20 )             34.3     04-10    143  |  100  |  23.0<H>  ----------------------------<  222<H>  3.9   |  24.0  |  0.89    Ca    10.2      10 Apr 2019 08:20    TPro  7.5  /  Alb  4.3  /  TBili  1.2  /  DBili  x   /  AST  260<H>  /  ALT  156<H>  /  AlkPhos  158<H>  04-10        < from: CT Abdomen and Pelvis w/ IV Cont (04.10.19 @ 11:04) >      EXAM:  CT ABDOMEN AND PELVIS IC                        PROCEDURE DATE:  04/10/2019      INTERPRETATION:  CLINICAL INFORMATION: Sepsis. Pancreatitis. Evaluate for   hydronephrosis.         COMPARISON: CT abdomen/pelvis 11/13/2015 an MRI abdomen 11/14/2015    PROCEDURE:   CT of the Abdomen and Pelvis was performed with intravenous contrast.   Arterial and Portal Venous phases were acquired.  Intravenous contrast: 95 ml Omnipaque 350.   Oral contrast:Water was administered.  Sagittal and coronal reformats were performed.    FINDINGS:    LOWER CHEST: Atelectasis or scarring at the lung bases.    LIVER: Within normal limits.  BILE DUCTS: Common bile duct measures 7 mm, unchanged since 7/13/2015.  GALLBLADDER: Status post cholecystectomy.  SPLEEN: Within normal limits.  PANCREAS: There is mild infiltration of the peripancreatic fat with   associated peripancreatic fluid consistent with pancreatitis. The   pancreas enhances normally without evidence of pancreatic process.   Peripancreatic fluid extends inferiorly and is adjacent to the transverse   duodenum.  ADRENALS: Within normal limits.  KIDNEYS/URETERS: Mild prominence of the renal collecting systems which is   likely secondary to a distended urinary bladder. Kidneys and ureters   otherwise unremarkable    BLADDER: Distended with a normal caliber wall.  REPRODUCTIVE ORGANS: There is central low attenuation in the uterus which   may be secondary to a thickened endometrium    BOWEL: No bowel obstruction. Small to moderate amount of retained fecal   material in the colon. Wall thickening versus underdistention involving   the transverse colon.  PERITONEUM: No ascites.  VESSELS:  Abdominal aorta normal in caliber with mild to moderate   atherosclerotic changes. Portal, splenic, and superior mesenteric veins   are patent. IVC and hepatic veins are patent.  RETROPERITONEUM: No lymphadenopathy.    ABDOMINAL WALL: Within normal limits.  BONES: Degenerative changes at L3-L4 including disc space narrowing and   endplate degenerative changes.    IMPRESSION:     Pancreatitis including infiltration of the peripancreatic fat with   associated peripancreatic fluid.    No evidence of pancreatic necrosis.    Wall thickening versus underdistention involving the transverse colon;   clinical correlation is recommended for colitis.    Central low-attenuation in the uterus which may be secondary to a   thickened endometrium; a transabdominal and transvaginal pelvic   ultrasound is recommended for further evaluation.      SHELLIE NOLAN   This document has been electronically signed. Apr 10 2019 11:32AM            < end of copied text >

## 2019-04-10 NOTE — ED STATDOCS - PROGRESS NOTE DETAILS
67 y/o F pt with hx of pancreatitis, cath last week presents to ED c/o acute onset of vomiting and abdominal pain/cramping since 4:00am this morning, that she states is similar to her past episodes of pancreatitis. She reports associated dizziness and feeling of near syncope. Last onset of pancreatitis was secondary to medication she was put on. Pt is s/p catheterization last week at [facility] with no complications. Allergy to Keflex and Penicillin. Denies SOB, CP, recent trauma. On exam pt has Mild guarding across the upper abd. Pt will be sent to Main ED for further eval. 67 y/o F pt with hx of pancreatitis, cath last week presents to ED c/o acute onset of vomiting and abdominal pain/cramping since 4:00am this morning, that she states is similar to her past episodes of pancreatitis. She reports associated dizziness and feeling of near syncope. Last onset of pancreatitis was secondary to medication she was put on. Pt is s/p catheterization last week at [facility] with no complications. Allergy to Keflex and Penicillin. Denies SOB, CP, recent trauma. On exam pt has Mild guarding across the upper abd, slumped forward in wheelchair stating she feels she is going to pass out. Pt will be sent to Main ED for further eval.

## 2019-04-10 NOTE — ED ADULT NURSE REASSESSMENT NOTE - NS ED NURSE REASSESS COMMENT FT1
Late entry : Pt transported to SICU Bed # 21 , verbal report given to Adelina , all questions answered, pt transported on CM , family aware of pt plan of care, care endorsed to SICU

## 2019-04-10 NOTE — ED PROVIDER NOTE - CRITICAL CARE PROVIDED
documentation/consult w/ pt's family directly relating to pts condition/45 MINUTES/consultation with other physicians/interpretation of diagnostic studies/direct patient care (not related to procedure)

## 2019-04-10 NOTE — ED ADULT NURSE NOTE - OBJECTIVE STATEMENT
Pt reports to ED c/o upper abd pain since  4 am , pt states Hx of pancreatitis, denies drinking , actively vomiting in ED , denies chest pain at this time

## 2019-04-10 NOTE — ED ADULT NURSE REASSESSMENT NOTE - NS ED NURSE REASSESS COMMENT FT1
Pt  was seen and eval by ICU and not accepted at this time, pt was seen and eval by surgical team , pt resting comfortably at this time, will continue to monitor

## 2019-04-11 LAB
ABO RH CONFIRMATION: SIGNIFICANT CHANGE UP
ANION GAP SERPL CALC-SCNC: 13 MMOL/L — SIGNIFICANT CHANGE UP (ref 5–17)
BASOPHILS # BLD AUTO: 0 K/UL — SIGNIFICANT CHANGE UP (ref 0–0.2)
BASOPHILS NFR BLD AUTO: 0.1 % — SIGNIFICANT CHANGE UP (ref 0–2)
BUN SERPL-MCNC: 20 MG/DL — SIGNIFICANT CHANGE UP (ref 8–20)
CALCIUM SERPL-MCNC: 8.1 MG/DL — LOW (ref 8.4–10.5)
CALCIUM SERPL-MCNC: 8.1 MG/DL — LOW (ref 8.6–10.2)
CHLORIDE SERPL-SCNC: 105 MMOL/L — SIGNIFICANT CHANGE UP (ref 98–107)
CHOLEST SERPL-MCNC: 91 MG/DL — LOW (ref 110–199)
CO2 SERPL-SCNC: 23 MMOL/L — SIGNIFICANT CHANGE UP (ref 22–29)
CREAT SERPL-MCNC: 0.57 MG/DL — SIGNIFICANT CHANGE UP (ref 0.5–1.3)
EOSINOPHIL # BLD AUTO: 0 K/UL — SIGNIFICANT CHANGE UP (ref 0–0.5)
EOSINOPHIL NFR BLD AUTO: 0.1 % — SIGNIFICANT CHANGE UP (ref 0–6)
GLUCOSE BLDC GLUCOMTR-MCNC: 122 MG/DL — HIGH (ref 70–99)
GLUCOSE BLDC GLUCOMTR-MCNC: 124 MG/DL — HIGH (ref 70–99)
GLUCOSE BLDC GLUCOMTR-MCNC: 157 MG/DL — HIGH (ref 70–99)
GLUCOSE BLDC GLUCOMTR-MCNC: 78 MG/DL — SIGNIFICANT CHANGE UP (ref 70–99)
GLUCOSE SERPL-MCNC: 119 MG/DL — HIGH (ref 70–115)
HBA1C BLD-MCNC: 6.4 % — HIGH (ref 4–5.6)
HCT VFR BLD CALC: 27.5 % — LOW (ref 37–47)
HCV AB S/CO SERPL IA: 0.1 S/CO — SIGNIFICANT CHANGE UP (ref 0–0.99)
HCV AB SERPL-IMP: SIGNIFICANT CHANGE UP
HGB BLD-MCNC: 9.2 G/DL — LOW (ref 12–16)
LIDOCAIN IGE QN: 1090 U/L — HIGH (ref 22–51)
LYMPHOCYTES # BLD AUTO: 1.7 K/UL — SIGNIFICANT CHANGE UP (ref 1–4.8)
LYMPHOCYTES # BLD AUTO: 12.4 % — LOW (ref 20–55)
MAGNESIUM SERPL-MCNC: 1.4 MG/DL — LOW (ref 1.6–2.6)
MCHC RBC-ENTMCNC: 30 PG — SIGNIFICANT CHANGE UP (ref 27–31)
MCHC RBC-ENTMCNC: 33.5 G/DL — SIGNIFICANT CHANGE UP (ref 32–36)
MCV RBC AUTO: 89.6 FL — SIGNIFICANT CHANGE UP (ref 81–99)
MONOCYTES # BLD AUTO: 0.6 K/UL — SIGNIFICANT CHANGE UP (ref 0–0.8)
MONOCYTES NFR BLD AUTO: 4.6 % — SIGNIFICANT CHANGE UP (ref 3–10)
NEUTROPHILS # BLD AUTO: 11.2 K/UL — HIGH (ref 1.8–8)
NEUTROPHILS NFR BLD AUTO: 82.4 % — HIGH (ref 37–73)
PHOSPHATE SERPL-MCNC: 2.9 MG/DL — SIGNIFICANT CHANGE UP (ref 2.4–4.7)
PLATELET # BLD AUTO: 180 K/UL — SIGNIFICANT CHANGE UP (ref 150–400)
POTASSIUM SERPL-MCNC: 3.1 MMOL/L — LOW (ref 3.5–5.3)
POTASSIUM SERPL-SCNC: 3.1 MMOL/L — LOW (ref 3.5–5.3)
PTH-INTACT FLD-MCNC: 182 PG/ML — HIGH (ref 15–65)
RBC # BLD: 3.07 M/UL — LOW (ref 4.4–5.2)
RBC # FLD: 12.7 % — SIGNIFICANT CHANGE UP (ref 11–15.6)
SODIUM SERPL-SCNC: 141 MMOL/L — SIGNIFICANT CHANGE UP (ref 135–145)
TRIGL SERPL-MCNC: 52 MG/DL — SIGNIFICANT CHANGE UP (ref 10–200)
WBC # BLD: 13.6 K/UL — HIGH (ref 4.8–10.8)
WBC # FLD AUTO: 13.6 K/UL — HIGH (ref 4.8–10.8)

## 2019-04-11 PROCEDURE — 99223 1ST HOSP IP/OBS HIGH 75: CPT

## 2019-04-11 PROCEDURE — 99231 SBSQ HOSP IP/OBS SF/LOW 25: CPT

## 2019-04-11 RX ORDER — ONDANSETRON 8 MG/1
4 TABLET, FILM COATED ORAL EVERY 6 HOURS
Qty: 0 | Refills: 0 | Status: DISCONTINUED | OUTPATIENT
Start: 2019-04-11 | End: 2019-04-15

## 2019-04-11 RX ORDER — ONDANSETRON 8 MG/1
4 TABLET, FILM COATED ORAL ONCE
Qty: 0 | Refills: 0 | Status: COMPLETED | OUTPATIENT
Start: 2019-04-11 | End: 2019-04-11

## 2019-04-11 RX ORDER — MAGNESIUM SULFATE 500 MG/ML
2 VIAL (ML) INJECTION ONCE
Qty: 0 | Refills: 0 | Status: COMPLETED | OUTPATIENT
Start: 2019-04-11 | End: 2019-04-11

## 2019-04-11 RX ORDER — OXYCODONE HYDROCHLORIDE 5 MG/1
5 TABLET ORAL EVERY 6 HOURS
Qty: 0 | Refills: 0 | Status: DISCONTINUED | OUTPATIENT
Start: 2019-04-11 | End: 2019-04-15

## 2019-04-11 RX ORDER — OXYCODONE HYDROCHLORIDE 5 MG/1
10 TABLET ORAL EVERY 6 HOURS
Qty: 0 | Refills: 0 | Status: DISCONTINUED | OUTPATIENT
Start: 2019-04-11 | End: 2019-04-15

## 2019-04-11 RX ORDER — POTASSIUM CHLORIDE 20 MEQ
10 PACKET (EA) ORAL
Qty: 0 | Refills: 0 | Status: COMPLETED | OUTPATIENT
Start: 2019-04-11 | End: 2019-04-11

## 2019-04-11 RX ORDER — HYDROMORPHONE HYDROCHLORIDE 2 MG/ML
0.5 INJECTION INTRAMUSCULAR; INTRAVENOUS; SUBCUTANEOUS EVERY 4 HOURS
Qty: 0 | Refills: 0 | Status: DISCONTINUED | OUTPATIENT
Start: 2019-04-11 | End: 2019-04-15

## 2019-04-11 RX ORDER — HYDROMORPHONE HYDROCHLORIDE 2 MG/ML
0.5 INJECTION INTRAMUSCULAR; INTRAVENOUS; SUBCUTANEOUS ONCE
Qty: 0 | Refills: 0 | Status: DISCONTINUED | OUTPATIENT
Start: 2019-04-11 | End: 2019-04-11

## 2019-04-11 RX ADMIN — Medication 25 MILLIGRAM(S): at 06:06

## 2019-04-11 RX ADMIN — ENOXAPARIN SODIUM 40 MILLIGRAM(S): 100 INJECTION SUBCUTANEOUS at 11:30

## 2019-04-11 RX ADMIN — Medication 1: at 17:14

## 2019-04-11 RX ADMIN — HYDROMORPHONE HYDROCHLORIDE 0.5 MILLIGRAM(S): 2 INJECTION INTRAMUSCULAR; INTRAVENOUS; SUBCUTANEOUS at 17:00

## 2019-04-11 RX ADMIN — Medication 25 MILLIGRAM(S): at 17:14

## 2019-04-11 RX ADMIN — SODIUM CHLORIDE 150 MILLILITER(S): 9 INJECTION, SOLUTION INTRAVENOUS at 03:18

## 2019-04-11 RX ADMIN — ONDANSETRON 4 MILLIGRAM(S): 8 TABLET, FILM COATED ORAL at 09:26

## 2019-04-11 RX ADMIN — Medication 100 MILLIEQUIVALENT(S): at 12:06

## 2019-04-11 RX ADMIN — ONDANSETRON 4 MILLIGRAM(S): 8 TABLET, FILM COATED ORAL at 20:10

## 2019-04-11 RX ADMIN — CHLORHEXIDINE GLUCONATE 1 APPLICATION(S): 213 SOLUTION TOPICAL at 11:30

## 2019-04-11 RX ADMIN — HYDROMORPHONE HYDROCHLORIDE 1 MILLIGRAM(S): 2 INJECTION INTRAMUSCULAR; INTRAVENOUS; SUBCUTANEOUS at 06:23

## 2019-04-11 RX ADMIN — HYDROMORPHONE HYDROCHLORIDE 0.5 MILLIGRAM(S): 2 INJECTION INTRAMUSCULAR; INTRAVENOUS; SUBCUTANEOUS at 02:59

## 2019-04-11 RX ADMIN — OXYCODONE HYDROCHLORIDE 10 MILLIGRAM(S): 5 TABLET ORAL at 20:19

## 2019-04-11 RX ADMIN — OXYCODONE HYDROCHLORIDE 10 MILLIGRAM(S): 5 TABLET ORAL at 14:01

## 2019-04-11 RX ADMIN — HYDROMORPHONE HYDROCHLORIDE 1 MILLIGRAM(S): 2 INJECTION INTRAMUSCULAR; INTRAVENOUS; SUBCUTANEOUS at 06:06

## 2019-04-11 RX ADMIN — ATORVASTATIN CALCIUM 10 MILLIGRAM(S): 80 TABLET, FILM COATED ORAL at 23:15

## 2019-04-11 RX ADMIN — HYDROMORPHONE HYDROCHLORIDE 0.5 MILLIGRAM(S): 2 INJECTION INTRAMUSCULAR; INTRAVENOUS; SUBCUTANEOUS at 09:30

## 2019-04-11 RX ADMIN — Medication 100 MILLIEQUIVALENT(S): at 09:27

## 2019-04-11 RX ADMIN — HYDROMORPHONE HYDROCHLORIDE 0.5 MILLIGRAM(S): 2 INJECTION INTRAMUSCULAR; INTRAVENOUS; SUBCUTANEOUS at 09:45

## 2019-04-11 RX ADMIN — SODIUM CHLORIDE 150 MILLILITER(S): 9 INJECTION, SOLUTION INTRAVENOUS at 17:15

## 2019-04-11 RX ADMIN — HYDROMORPHONE HYDROCHLORIDE 1 MILLIGRAM(S): 2 INJECTION INTRAMUSCULAR; INTRAVENOUS; SUBCUTANEOUS at 02:59

## 2019-04-11 RX ADMIN — HYDROMORPHONE HYDROCHLORIDE 0.5 MILLIGRAM(S): 2 INJECTION INTRAMUSCULAR; INTRAVENOUS; SUBCUTANEOUS at 16:45

## 2019-04-11 RX ADMIN — OXYCODONE HYDROCHLORIDE 10 MILLIGRAM(S): 5 TABLET ORAL at 21:20

## 2019-04-11 RX ADMIN — OXYCODONE HYDROCHLORIDE 10 MILLIGRAM(S): 5 TABLET ORAL at 13:01

## 2019-04-11 RX ADMIN — HYDROMORPHONE HYDROCHLORIDE 0.5 MILLIGRAM(S): 2 INJECTION INTRAMUSCULAR; INTRAVENOUS; SUBCUTANEOUS at 02:43

## 2019-04-11 RX ADMIN — Medication 50 GRAM(S): at 09:26

## 2019-04-11 RX ADMIN — Medication 100 MILLIEQUIVALENT(S): at 14:39

## 2019-04-11 RX ADMIN — SODIUM CHLORIDE 150 MILLILITER(S): 9 INJECTION, SOLUTION INTRAVENOUS at 23:48

## 2019-04-11 RX ADMIN — ONDANSETRON 4 MILLIGRAM(S): 8 TABLET, FILM COATED ORAL at 04:12

## 2019-04-11 NOTE — CONSULT NOTE ADULT - SUBJECTIVE AND OBJECTIVE BOX
HPI:  67 y/o female presents to ED complaining of approximately 1 day of worsening abdominal pain, nausea, and vomiting. Patient gave verbal consent to obtain majority of history from her  who is at bedside. Abdominal pain started yesterday morning, worsened throughout the day, mostly epigastric and sharp/stabbing in nature. Initially pain was intermittent but now it is constant. When patient vomits she states it helps to relieve the pain temporarily. Non-radiating. Associated with nausea & vomiting.  states patient has a lot of "stomach" issues so when she did not feel well yesterday morning they thought it was just her "stomach acting up again."  estimates that she vomited 20 times in the past 24 hours.  He states patient had medication induced pancreatitis approx. 4-5 years ago for which he thinks was caused by glipizide but he was not sure.     PMHx: pancreatitis, HTN, diabetes, RBBB, HLD, chronic back pain  PSHx: cardiac cath 2019 (negative, no intervention), cholecystectomy >10 yrs ago, appendectomy @ age 6, tonsillectomy cataract surgery  Allergies: PCN, unknown reaction  Medications: listed below  Social hx: denies any EtOH or tobacco use. Occasional opoid use for chronic back pain    PCP: Dr. Blake  Cardiologist: Dr. Mckeon (10 Apr 2019 12:42)      PAST MEDICAL & SURGICAL HISTORY:  Pancreatitis  Herniated lumbar intervertebral disc  Endogenous hyperlipemia  HTN (hypertension)  Diabetes  Status post cataract extraction: left eye 8/3/2015  S/P tonsillectomy  S/P cholecystectomy  S/P appendectomy      ROS:  No Heartburn, regurgitation, dysphagia, odynophagia.  No dyspepsia  No abdominal pain.    No Nausea, vomiting.  No Bleeding.  No hematemesis.   No diarrhea.    No hematochesia.  No weight loss, anorexia.  No edema.      MEDICATIONS  (STANDING):  atorvastatin 10 milliGRAM(s) Oral at bedtime  chlorhexidine 2% Cloths 1 Application(s) Topical daily  dextrose 5%. 1000 milliLiter(s) (50 mL/Hr) IV Continuous <Continuous>  dextrose 50% Injectable 12.5 Gram(s) IV Push once  dextrose 50% Injectable 25 Gram(s) IV Push once  dextrose 50% Injectable 25 Gram(s) IV Push once  enoxaparin Injectable 40 milliGRAM(s) SubCutaneous daily  influenza   Vaccine 0.5 milliLiter(s) IntraMuscular once  insulin lispro (HumaLOG) corrective regimen sliding scale   SubCutaneous every 6 hours  metoprolol tartrate 25 milliGRAM(s) Oral two times a day  multiple electrolytes Injection Type 1 1000 milliLiter(s) (150 mL/Hr) IV Continuous <Continuous>    MEDICATIONS  (PRN):  dextrose 40% Gel 15 Gram(s) Oral once PRN Blood Glucose LESS THAN 70 milliGRAM(s)/deciliter  glucagon  Injectable 1 milliGRAM(s) IntraMuscular once PRN Glucose LESS THAN 70 milligrams/deciliter  HYDROmorphone  Injectable 0.5 milliGRAM(s) IV Push every 4 hours PRN Breakthrough  oxyCODONE    IR 5 milliGRAM(s) Oral every 6 hours PRN Moderate Pain (4 - 6)  oxyCODONE    IR 10 milliGRAM(s) Oral every 6 hours PRN Severe Pain (7 - 10)      Allergies    Keflex (Anaphylaxis)  penicillin (Anaphylaxis)    Intolerances        SOCIAL HISTORY:    ENDOSCOPIC/GI HISTORY:    FAMILY HISTORY:  Family history of CHF (congestive heart failure)      Vital Signs Last 24 Hrs  T(C): 36.6 (2019 12:00), Max: 37.1 (2019 04:00)  T(F): 97.8 (2019 12:00), Max: 98.7 (2019 04:00)  HR: 75 (2019 14:00) (71 - 90)  BP: 132/64 (2019 14:00) (106/53 - 179/76)  BP(mean): 91 (2019 14:00) (76 - 109)  RR: 13 (2019 14:00) (9 - 22)  SpO2: 96% (2019 14:00) (93% - 100%)    PHYSICAL EXAM:    GENERAL: NAD, well-groomed, well-developed  HEAD:  Atraumatic, Normocephalic  EYES: EOMI, PERRLA, conjunctiva and sclera clear  ENMT: No tonsillar erythema, exudates, or enlargement; Moist mucous membranes, Good dentition, No lesions  NECK: Supple, No JVD, Normal thyroid  CHEST/LUNG: Clear to percussion bilaterally; No rales, rhonchi, wheezing, or rubs  HEART: Regular rate and rhythm; No murmurs, rubs, or gallops  ABDOMEN: Soft, Nontender, Nondistended; Bowel sounds present  EXTREMITIES:  2+ Peripheral Pulses, No clubbing, cyanosis, or edema  LYMPH: No lymphadenopathy noted  SKIN: No rashes or lesions      LABS:                        9.2    13.6  )-----------( 180      ( 2019 05:26 )             27.5     04-11    141  |  105  |  20.0  ----------------------------<  119<H>  3.1<L>   |  23.0  |  0.57    Ca    8.1<L>      2019 05:26  Phos  2.9       Mg     1.4         TPro  7.5  /  Alb  4.3  /  TBili  1.2  /  DBili  x   /  AST  260<H>  /  ALT  156<H>  /  AlkPhos  158<H>  10       Urinalysis Basic - ( 10 Apr 2019 18:18 )    Color: Yellow / Appearance: Clear / S.010 / pH: x  Gluc: x / Ketone: Moderate  / Bili: Negative / Urobili: Negative mg/dL   Blood: x / Protein: 30 mg/dL / Nitrite: Negative   Leuk Esterase: Negative / RBC: 3-5 /HPF / WBC 0-2   Sq Epi: x / Non Sq Epi: Negative / Bacteria: Occasional        LIVER FUNCTIONS - ( 10 Apr 2019 08:20 )  Alb: 4.3 g/dL / Pro: 7.5 g/dL / ALK PHOS: 158 U/L / ALT: 156 U/L / AST: 260 U/L / GGT: x               RADIOLOGY & ADDITIONAL STUDIES: HPI: 68 year old woman with history of pancreatitis about 6-7 years ago-assumed to be drug induced pancreatitis, DM, presented with complaints of nausea, vomiting and severe abdominal pain for one day duration. She recently has increased her metformin use. No new medications. She does not drink any alcohol at all. She had cholecystectomy about 20 years ago. She was noted to have mildly elevated LFts and significantly elevated lipase consistent with pancreatitis. CT abdomen revealed pancreatitis and thickened gastric wall.     Patient denies any weight loss. She does have intermittent nausea, vomiting. No EGD or colonoscopy recently.     Recent cardiac cath 2019 (negative, no intervention).    She is improving now with abdominal pain improvement.     PAST MEDICAL & SURGICAL HISTORY:  Pancreatitis  Herniated lumbar intervertebral disc  Endogenous hyperlipemia  HTN (hypertension)  Diabetes  Status post cataract extraction: left eye 8/3/2015  S/P tonsillectomy  S/P cholecystectomy  S/P appendectomy      ROS:  No Heartburn, regurgitation, dysphagia, odynophagia.  No dyspepsia  +abdominal pain.    + Nausea, vomiting.  No Bleeding.  No hematemesis.   No diarrhea.    No hematochezia  No weight loss, anorexia.  No edema.      MEDICATIONS  (STANDING):  atorvastatin 10 milliGRAM(s) Oral at bedtime  chlorhexidine 2% Cloths 1 Application(s) Topical daily  dextrose 5%. 1000 milliLiter(s) (50 mL/Hr) IV Continuous <Continuous>  dextrose 50% Injectable 12.5 Gram(s) IV Push once  dextrose 50% Injectable 25 Gram(s) IV Push once  dextrose 50% Injectable 25 Gram(s) IV Push once  enoxaparin Injectable 40 milliGRAM(s) SubCutaneous daily  influenza   Vaccine 0.5 milliLiter(s) IntraMuscular once  insulin lispro (HumaLOG) corrective regimen sliding scale   SubCutaneous every 6 hours  metoprolol tartrate 25 milliGRAM(s) Oral two times a day  multiple electrolytes Injection Type 1 1000 milliLiter(s) (150 mL/Hr) IV Continuous <Continuous>    MEDICATIONS  (PRN):  dextrose 40% Gel 15 Gram(s) Oral once PRN Blood Glucose LESS THAN 70 milliGRAM(s)/deciliter  glucagon  Injectable 1 milliGRAM(s) IntraMuscular once PRN Glucose LESS THAN 70 milligrams/deciliter  HYDROmorphone  Injectable 0.5 milliGRAM(s) IV Push every 4 hours PRN Breakthrough  oxyCODONE    IR 5 milliGRAM(s) Oral every 6 hours PRN Moderate Pain (4 - 6)  oxyCODONE    IR 10 milliGRAM(s) Oral every 6 hours PRN Severe Pain (7 - 10)      Allergies    Keflex (Anaphylaxis)  penicillin (Anaphylaxis)    Intolerances        SOCIAL HISTORY:Denies x 3    ENDOSCOPIC/GI HISTORY:No EGD or colonoscopy.    FAMILY HISTORY:  Family history of CHF (congestive heart failure)      Vital Signs Last 24 Hrs  T(C): 36.6 (2019 12:00), Max: 37.1 (2019 04:00)  T(F): 97.8 (2019 12:00), Max: 98.7 (2019 04:00)  HR: 75 (2019 14:00) (71 - 90)  BP: 132/64 (2019 14:00) (106/53 - 179/76)  BP(mean): 91 (2019 14:00) (76 - 109)  RR: 13 (2019 14:00) (9 - 22)  SpO2: 96% (2019 14:00) (93% - 100%)    PHYSICAL EXAM:    GENERAL: NAD, well-groomed, well-developed  HEAD:  Atraumatic, Normocephalic  EYES: EOMI, PERRLA, conjunctiva and sclera clear  ENMT: No tonsillar erythema, exudates, or enlargement; Moist mucous membranes, Good dentition, No lesions  NECK: Supple, No JVD, Normal thyroid  CHEST/LUNG: Clear to percussion bilaterally; No rales, rhonchi, wheezing, or rubs  HEART: Regular rate and rhythm; No murmurs, rubs, or gallops  ABDOMEN: Soft, tender, Nondistended; Bowel sounds present  EXTREMITIES:  2+ Peripheral Pulses, No clubbing, cyanosis, or edema  LYMPH: No lymphadenopathy noted  SKIN: No rashes or lesions      LABS:                        9.2    13.6  )-----------( 180      ( 2019 05:26 )             27.5     04-11    141  |  105  |  20.0  ----------------------------<  119<H>  3.1<L>   |  23.0  |  0.57    Ca    8.1<L>      2019 05:26  Phos  2.9     -  Mg     1.4         TPro  7.5  /  Alb  4.3  /  TBili  1.2  /  DBili  x   /  AST  260<H>  /  ALT  156<H>  /  AlkPhos  158<H>  04-10       Urinalysis Basic - ( 10 Apr 2019 18:18 )    Color: Yellow / Appearance: Clear / S.010 / pH: x  Gluc: x / Ketone: Moderate  / Bili: Negative / Urobili: Negative mg/dL   Blood: x / Protein: 30 mg/dL / Nitrite: Negative   Leuk Esterase: Negative / RBC: 3-5 /HPF / WBC 0-2   Sq Epi: x / Non Sq Epi: Negative / Bacteria: Occasional        LIVER FUNCTIONS - ( 10 Apr 2019 08:20 )  Alb: 4.3 g/dL / Pro: 7.5 g/dL / ALK PHOS: 158 U/L / ALT: 156 U/L / AST: 260 U/L / GGT: x           Lipase, Serum in AM (19 @ 05:26)    Lipase, Serum: 1090 U/L    Triglycerides, Serum in AM (19 @ 05:26)    Triglycerides, Serum: 52 mg/dL        RADIOLOGY & ADDITIONAL STUDIES:  < from: CT Abdomen and Pelvis w/ IV Cont (04.10.19 @ 11:04) >     EXAM:  CT ABDOMEN AND PELVIS IC                          PROCEDURE DATE:  04/10/2019          INTERPRETATION:  CLINICAL INFORMATION: Sepsis. Pancreatitis. Evaluate for   hydronephrosis.         COMPARISON: CT abdomen/pelvis 2015 an MRI abdomen 2015    PROCEDURE:   CT of the Abdomen and Pelvis was performed with intravenous contrast.   Arterial and Portal Venous phases were acquired.  Intravenous contrast: 95 ml Omnipaque 350.   Oral contrast:Water was administered.  Sagittal and coronal reformats were performed.    FINDINGS:    LOWER CHEST: Atelectasis or scarring at the lung bases.    LIVER: Within normal limits.  BILE DUCTS: Common bile duct measures 7 mm, unchanged since 2015.  GALLBLADDER: Status post cholecystectomy.  SPLEEN: Within normal limits.  PANCREAS: There is mild infiltration of the peripancreatic fat with   associated peripancreatic fluid consistent with pancreatitis. The   pancreas enhances normally without evidence of pancreatic process.   Peripancreatic fluid extends inferiorly and is adjacent to the transverse   duodenum.  ADRENALS: Within normal limits.  KIDNEYS/URETERS: Mild prominence of the renal collecting systems which is   likely secondary to a distended urinary bladder. Kidneys and ureters   otherwise unremarkable    BLADDER: Distended with a normal caliber wall.  REPRODUCTIVE ORGANS: There is central low attenuation in the uterus which   may be secondary to a thickened endometrium    BOWEL: No bowel obstruction. Small to moderate amount of retained fecal   material in the colon. Wall thickening versus underdistention involving   the transverse colon.  PERITONEUM: No ascites.  VESSELS:  Abdominal aorta normal in caliber with mild to moderate   atherosclerotic changes. Portal, splenic, and superior mesenteric veins   are patent. IVC and hepatic veins are patent.  RETROPERITONEUM: No lymphadenopathy.    ABDOMINAL WALL: Within normal limits.  BONES: Degenerative changes at L3-L4 including disc space narrowing and   endplate degenerative changes.    IMPRESSION:     Pancreatitis including infiltration of the peripancreatic fat with   associated peripancreatic fluid.    No evidence of pancreatic necrosis.    Wall thickening versus underdistention involving the transverse colon;   clinical correlation is recommended for colitis.    Central low-attenuation in the uterus which may be secondary to a   thickened endometrium; a transabdominal and transvaginal pelvic   ultrasound is recommended for further evaluation.                SHELLIE NOLAN   This document has been electronically signed. Apr 10 2019 11:32AM                  < end of copied text >

## 2019-04-11 NOTE — CONSULT NOTE ADULT - SUBJECTIVE AND OBJECTIVE BOX
Patient is a 68y old  Female who presents with a chief complaint of pancreatitis (11 Apr 2019 07:16)    HPI:  68F w/ history of medication induced pancreatitis (sulfonylurea?), T2DM, HLD and HTN presents to ED complaining of 1 day history of worsening abdominal pain, nausea, and vomiting. Lipase >1000. A1c 6.4. admitted for pancreatitis.      PMHx: pancreatitis, HTN, diabetes, RBBB, HLD, chronic back pain  PSHx: cardiac cath 2/7/2019 (negative, no intervention), cholecystectomy >10 yrs ago, appendectomy @ age 6, tonsillectomy cataract surgery      PCP: Dr. Blake  Cardiologist: Dr. Mckeon (10 Apr 2019 12:42)      PAST MEDICAL & SURGICAL HISTORY:  Pancreatitis  Herniated lumbar intervertebral disc  Endogenous hyperlipemia  HTN (hypertension)  Diabetes  Status post cataract extraction: left eye 8/3/2015  S/P tonsillectomy  S/P cholecystectomy  S/P appendectomy      Social hx: denies any EtOH or tobacco use. Occasional opoid use for chronic back pain    FAMILY HISTORY:  Family history of CHF (congestive heart failure)        Allergies    Keflex (Anaphylaxis)  penicillin (Anaphylaxis)          REVIEW OF SYSTEMS:    CONSTITUTIONAL: No fever, weight loss, or fatigue  EYES: No eye pain, visual disturbances, or discharge  ENMT:  No difficulty hearing, tinnitus, vertigo; No sinus or throat pain  NECK: No pain or stiffness  RESPIRATORY: No cough, wheezing, chills or hemoptysis; No shortness of breath  CARDIOVASCULAR: No chest pain, palpitations, dizziness, or leg swelling  GASTROINTESTINAL: No abdominal or epigastric pain. No nausea, vomiting, or hematemesis; No diarrhea or constipation. No melena or hematochezia.  NEUROLOGICAL: No headaches, memory loss, loss of strength, numbness, or tremors  SKIN: No itching, burning, rashes, or lesions   MUSCULOSKELETAL: No joint pain or swelling; No muscle, back, or extremity pain  PSYCHIATRIC: No depression, anxiety, mood swings, or difficulty sleeping        MEDICATIONS  (STANDING):  atorvastatin 10 milliGRAM(s) Oral at bedtime  chlorhexidine 2% Cloths 1 Application(s) Topical daily  dextrose 5%. 1000 milliLiter(s) (50 mL/Hr) IV Continuous <Continuous>  dextrose 50% Injectable 12.5 Gram(s) IV Push once  dextrose 50% Injectable 25 Gram(s) IV Push once  dextrose 50% Injectable 25 Gram(s) IV Push once  enoxaparin Injectable 40 milliGRAM(s) SubCutaneous daily  influenza   Vaccine 0.5 milliLiter(s) IntraMuscular once  insulin lispro (HumaLOG) corrective regimen sliding scale   SubCutaneous every 6 hours  metoprolol tartrate 25 milliGRAM(s) Oral two times a day  multiple electrolytes Injection Type 1 1000 milliLiter(s) (150 mL/Hr) IV Continuous <Continuous>  potassium chloride  10 mEq/100 mL IVPB 10 milliEquivalent(s) IV Intermittent every 1 hour    MEDICATIONS  (PRN):  dextrose 40% Gel 15 Gram(s) Oral once PRN Blood Glucose LESS THAN 70 milliGRAM(s)/deciliter  glucagon  Injectable 1 milliGRAM(s) IntraMuscular once PRN Glucose LESS THAN 70 milligrams/deciliter  HYDROmorphone  Injectable 1 milliGRAM(s) IV Push every 6 hours PRN Severe Pain (7 - 10)  HYDROmorphone  Injectable 0.5 milliGRAM(s) IV Push every 4 hours PRN Breakthrough  oxyCODONE    IR 5 milliGRAM(s) Oral every 6 hours PRN Moderate Pain (4 - 6)  oxyCODONE    IR 10 milliGRAM(s) Oral every 6 hours PRN Severe Pain (7 - 10)      Vital Signs Last 24 Hrs  T(C): 36.6 (11 Apr 2019 12:00), Max: 37.1 (10 Apr 2019 13:53)  T(F): 97.8 (11 Apr 2019 12:00), Max: 98.8 (10 Apr 2019 13:53)  HR: 75 (11 Apr 2019 12:00) (71 - 98)  BP: 158/69 (11 Apr 2019 12:00) (106/53 - 158/69)  BP(mean): 99 (11 Apr 2019 12:00) (76 - 102)  RR: 13 (11 Apr 2019 12:00) (9 - 22)  SpO2: 99% (11 Apr 2019 12:00) (93% - 100%)      PHYSICAL EXAM:    Constitutional: NAD, well-groomed, well-developed  Neck: trachea midline, no thyroid enlargement  Respiratory: CTAB, non labored respirations  Cardiovascular: S1 and S2, RRR  Gastrointestinal: BS+, soft, mild abd tenderness  Extremities: No peripheral edema  Neurological: A/O x 3, no focal deficits  Psychiatric: Normal mood, normal affect  Skin: No rashes, no acanthosis        LABS  04-11    141  |  105  |  20.0  ----------------------------<  119<H>  3.1<L>   |  23.0  |  0.57    Ca    8.1<L>      11 Apr 2019 05:26  Phos  2.9     04-11  Mg     1.4     04-11    TPro  7.5  /  Alb  4.3  /  TBili  1.2  /  DBili  x   /  AST  260<H>  /  ALT  156<H>  /  AlkPhos  158<H>  04-10                          9.2    13.6  )-----------( 180      ( 11 Apr 2019 05:26 )             27.5       Triglycerides, Serum: 52 mg/dL (04-11-19 @ 05:26)  Cholesterol, Serum: 91 mg/dL (04-11-19 @ 05:26)      Hemoglobin A1C, Whole Blood: 6.4 % (04-11 @ 05:26)    Ketone - Urine: Moderate (04-10 @ 18:18)    Alkaline Phosphatase, Serum: 158 U/L (04-10-19 @ 08:20)  Aspartate Aminotransferase (AST/SGOT): 260 U/L (04-10-19 @ 08:20)  Albumin, Serum: 4.3 g/dL (04-10-19 @ 08:20)  Alanine Aminotransferase (ALT/SGPT): 156 U/L (04-10-19 @ 08:20)      CAPILLARY BLOOD GLUCOSE      POCT Blood Glucose.: 78 mg/dL (11 Apr 2019 11:29)  POCT Blood Glucose.: 122 mg/dL (11 Apr 2019 00:40)  POCT Blood Glucose.: 180 mg/dL (10 Apr 2019 17:05) Patient is a 68y old  Female who presents with a chief complaint of pancreatitis (11 Apr 2019 07:16)    HPI:  68F w/ history of medication induced pancreatitis (unknown medication), T2DM cb neuropathy/L DR, HLD and HTN presents to ED complaining of 1 day history of worsening abdominal pain, nausea, and vomiting. Lipase >1000. A1c 6.4. admitted for pancreatitis.  has second episode of pancreatitis, unknown cause  dx 2012 with diabetes and at that time with blurry vision  complications: L , neuropathy  FH of diabetes in father, mother  meds: glipizide BID, metformin 4 pills daily  no EtOH  admits to dietary indiscretion, likes pasta   and wife with multiple questions about diabetes and pancreatitis    PMHx: pancreatitis, HTN, diabetes, RBBB, HLD, chronic back pain  PSHx: cardiac cath 2/7/2019 (negative, no intervention), cholecystectomy >10 yrs ago, appendectomy @ age 6, tonsillectomy cataract surgery      PCP: Dr. Blake  Cardiologist: Dr. Mckeon (10 Apr 2019 12:42)      PAST MEDICAL & SURGICAL HISTORY:  Pancreatitis  Herniated lumbar intervertebral disc  Endogenous hyperlipemia  HTN (hypertension)  Diabetes  Status post cataract extraction: left eye 8/3/2015  S/P tonsillectomy  S/P cholecystectomy  S/P appendectomy      Social hx: denies any EtOH or tobacco use. Occasional opoid use for chronic back pain    FAMILY HISTORY:  Family history of CHF (congestive heart failure)        Allergies    Keflex (Anaphylaxis)  penicillin (Anaphylaxis)          REVIEW OF SYSTEMS:    CONSTITUTIONAL: No fever, weight loss, or fatigue  EYES: No eye pain, visual disturbances, or discharge  ENMT:  No difficulty hearing, tinnitus, vertigo; No sinus or throat pain  NECK: No pain or stiffness  RESPIRATORY: No cough, wheezing, chills or hemoptysis; No shortness of breath  CARDIOVASCULAR: No chest pain, palpitations, dizziness, or leg swelling  GASTROINTESTINAL: No abdominal or epigastric pain. No nausea, vomiting, or hematemesis; No diarrhea or constipation. No melena or hematochezia.  NEUROLOGICAL: No headaches, memory loss, loss of strength, numbness, or tremors  SKIN: No itching, burning, rashes, or lesions   MUSCULOSKELETAL: No joint pain or swelling; No muscle, back, or extremity pain  PSYCHIATRIC: No depression, anxiety, mood swings, or difficulty sleeping        MEDICATIONS  (STANDING):  atorvastatin 10 milliGRAM(s) Oral at bedtime  chlorhexidine 2% Cloths 1 Application(s) Topical daily  dextrose 5%. 1000 milliLiter(s) (50 mL/Hr) IV Continuous <Continuous>  dextrose 50% Injectable 12.5 Gram(s) IV Push once  dextrose 50% Injectable 25 Gram(s) IV Push once  dextrose 50% Injectable 25 Gram(s) IV Push once  enoxaparin Injectable 40 milliGRAM(s) SubCutaneous daily  influenza   Vaccine 0.5 milliLiter(s) IntraMuscular once  insulin lispro (HumaLOG) corrective regimen sliding scale   SubCutaneous every 6 hours  metoprolol tartrate 25 milliGRAM(s) Oral two times a day  multiple electrolytes Injection Type 1 1000 milliLiter(s) (150 mL/Hr) IV Continuous <Continuous>  potassium chloride  10 mEq/100 mL IVPB 10 milliEquivalent(s) IV Intermittent every 1 hour    MEDICATIONS  (PRN):  dextrose 40% Gel 15 Gram(s) Oral once PRN Blood Glucose LESS THAN 70 milliGRAM(s)/deciliter  glucagon  Injectable 1 milliGRAM(s) IntraMuscular once PRN Glucose LESS THAN 70 milligrams/deciliter  HYDROmorphone  Injectable 1 milliGRAM(s) IV Push every 6 hours PRN Severe Pain (7 - 10)  HYDROmorphone  Injectable 0.5 milliGRAM(s) IV Push every 4 hours PRN Breakthrough  oxyCODONE    IR 5 milliGRAM(s) Oral every 6 hours PRN Moderate Pain (4 - 6)  oxyCODONE    IR 10 milliGRAM(s) Oral every 6 hours PRN Severe Pain (7 - 10)      Vital Signs Last 24 Hrs  T(C): 36.6 (11 Apr 2019 12:00), Max: 37.1 (10 Apr 2019 13:53)  T(F): 97.8 (11 Apr 2019 12:00), Max: 98.8 (10 Apr 2019 13:53)  HR: 75 (11 Apr 2019 12:00) (71 - 98)  BP: 158/69 (11 Apr 2019 12:00) (106/53 - 158/69)  BP(mean): 99 (11 Apr 2019 12:00) (76 - 102)  RR: 13 (11 Apr 2019 12:00) (9 - 22)  SpO2: 99% (11 Apr 2019 12:00) (93% - 100%)      PHYSICAL EXAM:    Constitutional: NAD, well-groomed, well-developed  Neck: trachea midline, no thyroid enlargement  Respiratory: CTAB, non labored respirations  Cardiovascular: S1 and S2, RRR  Gastrointestinal: BS+, soft, mild abd tenderness  Extremities: No peripheral edema  Neurological: A/O x 3, no focal deficits  Psychiatric: Normal mood, normal affect  Skin: No rashes, no acanthosis        LABS  04-11    141  |  105  |  20.0  ----------------------------<  119<H>  3.1<L>   |  23.0  |  0.57    Ca    8.1<L>      11 Apr 2019 05:26  Phos  2.9     04-11  Mg     1.4     04-11    TPro  7.5  /  Alb  4.3  /  TBili  1.2  /  DBili  x   /  AST  260<H>  /  ALT  156<H>  /  AlkPhos  158<H>  04-10                          9.2    13.6  )-----------( 180      ( 11 Apr 2019 05:26 )             27.5       Triglycerides, Serum: 52 mg/dL (04-11-19 @ 05:26)  Cholesterol, Serum: 91 mg/dL (04-11-19 @ 05:26)      Hemoglobin A1C, Whole Blood: 6.4 % (04-11 @ 05:26)    Ketone - Urine: Moderate (04-10 @ 18:18)    Alkaline Phosphatase, Serum: 158 U/L (04-10-19 @ 08:20)  Aspartate Aminotransferase (AST/SGOT): 260 U/L (04-10-19 @ 08:20)  Albumin, Serum: 4.3 g/dL (04-10-19 @ 08:20)  Alanine Aminotransferase (ALT/SGPT): 156 U/L (04-10-19 @ 08:20)      CAPILLARY BLOOD GLUCOSE      POCT Blood Glucose.: 78 mg/dL (11 Apr 2019 11:29)  POCT Blood Glucose.: 122 mg/dL (11 Apr 2019 00:40)  POCT Blood Glucose.: 180 mg/dL (10 Apr 2019 17:05)

## 2019-04-11 NOTE — PROGRESS NOTE ADULT - SUBJECTIVE AND OBJECTIVE BOX
Acute Care Surgery / Trauma Surgery   [facility] [place]  ===============================  Interval/Overnight Events: 69 y/o F PMH of HTN, DM, RBBB, and dyslipidemia admitted for management of exacerbation of chronic medication induced pancreatitis. Overnight patient reports pain well controlled. Says she had 1 episode of dry heaving, no vomit, and she was medicated with quick resolution and no recurrence. Denies any lightheadedness, weakness, or changes in pain quality. Tolerating ice chips, hasn't had much of an appetite. Feels well. No respiratory distress.     VITAL SIGNS:  T(C): 37.1 (04-11-19 @ 04:00), Max: 37.1 (04-10-19 @ 13:53)  HR: 79 (04-11-19 @ 06:00) (76 - 110)  BP: 135/63 (04-11-19 @ 06:00) (106/53 - 155/81)  ABP: --  ABP(mean): --  RR: 11 (04-11-19 @ 06:00) (9 - 22)  SpO2: 99% (04-11-19 @ 06:00) (93% - 100%)  CVP(mm Hg): --    NEUROLOGY:  Neurologic Medications:  HYDROmorphone  Injectable 0.5 milliGRAM(s) IV Push every 4 hours PRN  HYDROmorphone  Injectable 1 milliGRAM(s) IV Push every 6 hours PRN  ondansetron Injectable 4 milliGRAM(s) IV Push once    Exam: a&o x3, CAM neg, RASS 0, GCS 15, no focal deficits   CAM ICU:  [x] Adequacy of sedation and pain control has been assessed and adjusted  Comments:    RESPIRATORY: room air, tolerating well, no respiratory distress   Respiratory Medications:    Exam: CTAB   Mechanical Ventilation:       [ ] Extubation Readiness Assessed    CARDIOVASCULAR: hemodynamically controlled   Cardiovascular Medications:  metoprolol tartrate 25 milliGRAM(s) Oral two times a day    Exam: RRR, sinus   VBG - ( 10 Apr 2019 13:45 )  pH: x     /  pCO2: x     /  pO2: x     / HCO3: x     / Base Excess: x     /  SvO2: x     / Lactate: 1.0      Cardiac Rhythm:	sinus  ECHO:  Comments:    Metabolic/FLUIDS/ELECTROLYTES/NUTRITION:  Gastrointestinal Medications:  dextrose 5%. 1000 milliLiter(s) IV Continuous <Continuous>  magnesium sulfate  IVPB 2 Gram(s) IV Intermittent once  multiple electrolytes Injection Type 1 1000 milliLiter(s) IV Continuous <Continuous>  potassium chloride  10 mEq/100 mL IVPB 10 milliEquivalent(s) IV Intermittent every 1 hour    I&O's Detail  68y  Daily   Overnight: 1650 in via IVF hydration, voided 900.   UOP: 50-75 cc/hr since admission      04-11    141  |  105  |  20.0  ----------------------------<  119<H>  3.1<L>   |  23.0  |  0.57    Ca    8.1<L>      11 Apr 2019 05:26  Phos  2.9     04-11  Mg     1.4     04-11    TPro  7.5  /  Alb  4.3  /  TBili  1.2  /  DBili  x   /  AST  260<H>  /  ALT  156<H>  /  AlkPhos  158<H>  04-10      Diet: soft   Comments:    HEMATOLOGIC:  Hematologic/Oncologic Medications:  enoxaparin Injectable 40 milliGRAM(s) SubCutaneous daily    [x] DVT Prophylaxis:                                              9.2                   Neurophils% (auto):   82.4   (04-11 @ 05:26):    13.6 )-----------(180          Lymphocytes% (auto):  12.4                                          27.5                   Eosinphils% (auto):   0.1      Manual%: Neutrophils x    ; Lymphocytes x    ; Eosinophils x    ; Bands%: x    ; Blasts x            Transfusions:	[ ] PRBC	[ ] Platelets	[ ] FFP		[ ] Cryoprecipitate    Comments:    INFECTIOUS DISEASE: none  Antimicrobials/Immunologic Medications:  influenza   Vaccine 0.5 milliLiter(s) IntraMuscular once    RECENT CULTURES:        PATIENT CARE ACCESS DEVICES:  [x] Peripheral IV  [ ] Central Venous Line	[ ] R	[ ] L	[ ] IJ	[ ] Fem	[ ] SC	Placed:   [ ] Arterial Line		[ ] R	[ ] L	[ ] Fem	[ ] Rad	[ ] Ax	Placed:   [ ] PICC:					[ ] Mediport  [x] Urinary Catheter, Date Placed:   [ ] Necessity of urinary, arterial, and venous catheters discussed    OTHER MEDICATIONS:  Endocrine/Metabolic Medications:  atorvastatin 10 milliGRAM(s) Oral at bedtime  dextrose 40% Gel 15 Gram(s) Oral once PRN  dextrose 50% Injectable 12.5 Gram(s) IV Push once  dextrose 50% Injectable 25 Gram(s) IV Push once  dextrose 50% Injectable 25 Gram(s) IV Push once  glucagon  Injectable 1 milliGRAM(s) IntraMuscular once PRN  insulin lispro (HumaLOG) corrective regimen sliding scale   SubCutaneous every 6 hours    Genitourinary Medications:    Topical/Other Medications:  chlorhexidine 2% Cloths 1 Application(s) Topical daily

## 2019-04-11 NOTE — CONSULT NOTE ADULT - ASSESSMENT
68F w/ history of medication induced pancreatitis (sulfonylurea?), T2DM, HLD and HTN presents to ED complaining of 1 day history of worsening abdominal pain, nausea, and vomiting. Lipase >1000. A1c 6.4. admitted for pancreatitis.    T2DM- A1c well controlled at 6.4  -npo currently  -continue humalog sliding scale  -advance diet as tolerated by primary team    Pancreatitis  -check cpeptide when patient is eating to see if patient has enough insulin stores  -npo currently, on fluids    HLD- on statin    Hypocalcemia- mild, probably related to n/v and being npo. monitor for now. if still low will check vitamin D levels    Hypokalemia- replete. 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 Lipase >1000. A1c 6.4. admitted for pancreatitis.    T2DM- A1c well controlled at 6.4  -tolerating clears  -continue humalog sliding scale  -advance diet as tolerated by primary team  -would continue metformin as an outpatient but would dc glipizide (might be a cause for the pancreatitis)  -discussed at length diabetes, pancreatitis, pathophys, medications with the patient     Pancreatitis  -check cpeptide when patient is eating to see if patient has enough insulin stores  -on fluids, on clears    HLD- on statin    Hypocalcemia- mild, probably related to n/v and being npo. monitor for now. if still low will check vitamin D levels    Hypokalemia- replete. monitor

## 2019-04-11 NOTE — PROGRESS NOTE ADULT - ATTENDING COMMENTS
pancreatitis drug induced. 4/5 ines on day one.  abd pain is continued but improved, non peritoneal  labs reviewed   start clears  cont with ivf  consult endo for DM, possible dm meds causing pancreatitis  consult gi for possible egd when pancreatitis resolves to evaluate for gastritis, ct with diffuse thickened stomach.

## 2019-04-12 LAB
ALBUMIN SERPL ELPH-MCNC: 3.3 G/DL — SIGNIFICANT CHANGE UP (ref 3.3–5.2)
ALP SERPL-CCNC: 159 U/L — HIGH (ref 40–120)
ALT FLD-CCNC: 141 U/L — HIGH
ANION GAP SERPL CALC-SCNC: 11 MMOL/L — SIGNIFICANT CHANGE UP (ref 5–17)
AST SERPL-CCNC: 73 U/L — HIGH
BILIRUB DIRECT SERPL-MCNC: 0.6 MG/DL — HIGH (ref 0–0.3)
BILIRUB INDIRECT FLD-MCNC: 0.5 MG/DL — SIGNIFICANT CHANGE UP (ref 0.2–1)
BILIRUB SERPL-MCNC: 1.1 MG/DL — SIGNIFICANT CHANGE UP (ref 0.4–2)
BUN SERPL-MCNC: 14 MG/DL — SIGNIFICANT CHANGE UP (ref 8–20)
CALCIUM SERPL-MCNC: 8.5 MG/DL — LOW (ref 8.6–10.2)
CHLORIDE SERPL-SCNC: 104 MMOL/L — SIGNIFICANT CHANGE UP (ref 98–107)
CO2 SERPL-SCNC: 25 MMOL/L — SIGNIFICANT CHANGE UP (ref 22–29)
CREAT SERPL-MCNC: 0.61 MG/DL — SIGNIFICANT CHANGE UP (ref 0.5–1.3)
EOSINOPHIL # BLD AUTO: 0.1 K/UL — SIGNIFICANT CHANGE UP (ref 0–0.5)
EOSINOPHIL NFR BLD AUTO: 0.9 % — SIGNIFICANT CHANGE UP (ref 0–6)
GLUCOSE BLDC GLUCOMTR-MCNC: 186 MG/DL — HIGH (ref 70–99)
GLUCOSE BLDC GLUCOMTR-MCNC: 208 MG/DL — HIGH (ref 70–99)
GLUCOSE BLDC GLUCOMTR-MCNC: 70 MG/DL — SIGNIFICANT CHANGE UP (ref 70–99)
GLUCOSE BLDC GLUCOMTR-MCNC: 77 MG/DL — SIGNIFICANT CHANGE UP (ref 70–99)
GLUCOSE SERPL-MCNC: 73 MG/DL — SIGNIFICANT CHANGE UP (ref 70–115)
HCT VFR BLD CALC: 27.9 % — LOW (ref 37–47)
HGB BLD-MCNC: 9.2 G/DL — LOW (ref 12–16)
LYMPHOCYTES # BLD AUTO: 2 K/UL — SIGNIFICANT CHANGE UP (ref 1–4.8)
LYMPHOCYTES # BLD AUTO: 22.9 % — SIGNIFICANT CHANGE UP (ref 20–55)
MAGNESIUM SERPL-MCNC: 2 MG/DL — SIGNIFICANT CHANGE UP (ref 1.6–2.6)
MCHC RBC-ENTMCNC: 29.7 PG — SIGNIFICANT CHANGE UP (ref 27–31)
MCHC RBC-ENTMCNC: 33 G/DL — SIGNIFICANT CHANGE UP (ref 32–36)
MCV RBC AUTO: 90 FL — SIGNIFICANT CHANGE UP (ref 81–99)
MONOCYTES # BLD AUTO: 0.5 K/UL — SIGNIFICANT CHANGE UP (ref 0–0.8)
MONOCYTES NFR BLD AUTO: 5.5 % — SIGNIFICANT CHANGE UP (ref 3–10)
NEUTROPHILS # BLD AUTO: 6 K/UL — SIGNIFICANT CHANGE UP (ref 1.8–8)
NEUTROPHILS NFR BLD AUTO: 70.5 % — SIGNIFICANT CHANGE UP (ref 37–73)
PHOSPHATE SERPL-MCNC: 2.1 MG/DL — LOW (ref 2.4–4.7)
PLATELET # BLD AUTO: 163 K/UL — SIGNIFICANT CHANGE UP (ref 150–400)
POTASSIUM SERPL-MCNC: 4 MMOL/L — SIGNIFICANT CHANGE UP (ref 3.5–5.3)
POTASSIUM SERPL-SCNC: 4 MMOL/L — SIGNIFICANT CHANGE UP (ref 3.5–5.3)
PROT SERPL-MCNC: 5.9 G/DL — LOW (ref 6.6–8.7)
RBC # BLD: 3.1 M/UL — LOW (ref 4.4–5.2)
RBC # FLD: 12.8 % — SIGNIFICANT CHANGE UP (ref 11–15.6)
SODIUM SERPL-SCNC: 140 MMOL/L — SIGNIFICANT CHANGE UP (ref 135–145)
WBC # BLD: 8.6 K/UL — SIGNIFICANT CHANGE UP (ref 4.8–10.8)
WBC # FLD AUTO: 8.6 K/UL — SIGNIFICANT CHANGE UP (ref 4.8–10.8)

## 2019-04-12 PROCEDURE — 99232 SBSQ HOSP IP/OBS MODERATE 35: CPT

## 2019-04-12 PROCEDURE — 99233 SBSQ HOSP IP/OBS HIGH 50: CPT

## 2019-04-12 RX ORDER — PANTOPRAZOLE SODIUM 20 MG/1
40 TABLET, DELAYED RELEASE ORAL
Qty: 0 | Refills: 0 | Status: DISCONTINUED | OUTPATIENT
Start: 2019-04-12 | End: 2019-04-12

## 2019-04-12 RX ORDER — METOCLOPRAMIDE HCL 10 MG
5 TABLET ORAL ONCE
Qty: 0 | Refills: 0 | Status: COMPLETED | OUTPATIENT
Start: 2019-04-12 | End: 2019-04-12

## 2019-04-12 RX ORDER — PANTOPRAZOLE SODIUM 20 MG/1
40 TABLET, DELAYED RELEASE ORAL EVERY 12 HOURS
Qty: 0 | Refills: 0 | Status: DISCONTINUED | OUTPATIENT
Start: 2019-04-12 | End: 2019-04-15

## 2019-04-12 RX ADMIN — OXYCODONE HYDROCHLORIDE 5 MILLIGRAM(S): 5 TABLET ORAL at 10:37

## 2019-04-12 RX ADMIN — OXYCODONE HYDROCHLORIDE 10 MILLIGRAM(S): 5 TABLET ORAL at 05:58

## 2019-04-12 RX ADMIN — OXYCODONE HYDROCHLORIDE 5 MILLIGRAM(S): 5 TABLET ORAL at 11:30

## 2019-04-12 RX ADMIN — HYDROMORPHONE HYDROCHLORIDE 0.5 MILLIGRAM(S): 2 INJECTION INTRAMUSCULAR; INTRAVENOUS; SUBCUTANEOUS at 11:30

## 2019-04-12 RX ADMIN — HYDROMORPHONE HYDROCHLORIDE 0.5 MILLIGRAM(S): 2 INJECTION INTRAMUSCULAR; INTRAVENOUS; SUBCUTANEOUS at 10:40

## 2019-04-12 RX ADMIN — Medication 62.5 MILLIMOLE(S): at 10:37

## 2019-04-12 RX ADMIN — OXYCODONE HYDROCHLORIDE 10 MILLIGRAM(S): 5 TABLET ORAL at 13:08

## 2019-04-12 RX ADMIN — OXYCODONE HYDROCHLORIDE 10 MILLIGRAM(S): 5 TABLET ORAL at 07:00

## 2019-04-12 RX ADMIN — Medication 2: at 23:23

## 2019-04-12 RX ADMIN — ATORVASTATIN CALCIUM 10 MILLIGRAM(S): 80 TABLET, FILM COATED ORAL at 23:23

## 2019-04-12 RX ADMIN — HYDROMORPHONE HYDROCHLORIDE 0.5 MILLIGRAM(S): 2 INJECTION INTRAMUSCULAR; INTRAVENOUS; SUBCUTANEOUS at 00:20

## 2019-04-12 RX ADMIN — OXYCODONE HYDROCHLORIDE 5 MILLIGRAM(S): 5 TABLET ORAL at 16:46

## 2019-04-12 RX ADMIN — HYDROMORPHONE HYDROCHLORIDE 0.5 MILLIGRAM(S): 2 INJECTION INTRAMUSCULAR; INTRAVENOUS; SUBCUTANEOUS at 20:39

## 2019-04-12 RX ADMIN — OXYCODONE HYDROCHLORIDE 10 MILLIGRAM(S): 5 TABLET ORAL at 13:50

## 2019-04-12 RX ADMIN — Medication 25 MILLIGRAM(S): at 17:19

## 2019-04-12 RX ADMIN — OXYCODONE HYDROCHLORIDE 10 MILLIGRAM(S): 5 TABLET ORAL at 23:35

## 2019-04-12 RX ADMIN — OXYCODONE HYDROCHLORIDE 5 MILLIGRAM(S): 5 TABLET ORAL at 17:30

## 2019-04-12 RX ADMIN — ENOXAPARIN SODIUM 40 MILLIGRAM(S): 100 INJECTION SUBCUTANEOUS at 11:54

## 2019-04-12 RX ADMIN — HYDROMORPHONE HYDROCHLORIDE 0.5 MILLIGRAM(S): 2 INJECTION INTRAMUSCULAR; INTRAVENOUS; SUBCUTANEOUS at 00:05

## 2019-04-12 RX ADMIN — SODIUM CHLORIDE 150 MILLILITER(S): 9 INJECTION, SOLUTION INTRAVENOUS at 06:17

## 2019-04-12 RX ADMIN — PANTOPRAZOLE SODIUM 40 MILLIGRAM(S): 20 TABLET, DELAYED RELEASE ORAL at 17:20

## 2019-04-12 RX ADMIN — Medication 1: at 17:20

## 2019-04-12 RX ADMIN — HYDROMORPHONE HYDROCHLORIDE 0.5 MILLIGRAM(S): 2 INJECTION INTRAMUSCULAR; INTRAVENOUS; SUBCUTANEOUS at 20:23

## 2019-04-12 RX ADMIN — Medication 25 MILLIGRAM(S): at 05:58

## 2019-04-12 RX ADMIN — Medication 5 MILLIGRAM(S): at 00:12

## 2019-04-12 RX ADMIN — CHLORHEXIDINE GLUCONATE 1 APPLICATION(S): 213 SOLUTION TOPICAL at 11:55

## 2019-04-12 NOTE — DIETITIAN INITIAL EVALUATION ADULT. - OTHER INFO
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 Pt started on clears yesterday, tolerating at this time has not vomited in 24 hours. Pt reports eating well at home, has had no recent wt changes. Diet to be advanced later today noted.

## 2019-04-12 NOTE — PROGRESS NOTE ADULT - ATTENDING COMMENTS
Patient seen and examined.  pain has improved  has meet all end points of resuscitation,.  advance diet  OOB ambulate.

## 2019-04-12 NOTE — PROGRESS NOTE ADULT - SUBJECTIVE AND OBJECTIVE BOX
INTERVAL HPI/OVERNIGHT EVENTS:FU for pancreatitis and also thickened gastric wall. Still has abdominal pain. C/o hiccups. MRI scheduled. Feels better. Was passing flatus last night.    MEDICATIONS  (STANDING):  atorvastatin 10 milliGRAM(s) Oral at bedtime  chlorhexidine 2% Cloths 1 Application(s) Topical daily  dextrose 5%. 1000 milliLiter(s) (50 mL/Hr) IV Continuous <Continuous>  dextrose 50% Injectable 12.5 Gram(s) IV Push once  dextrose 50% Injectable 25 Gram(s) IV Push once  dextrose 50% Injectable 25 Gram(s) IV Push once  enoxaparin Injectable 40 milliGRAM(s) SubCutaneous daily  influenza   Vaccine 0.5 milliLiter(s) IntraMuscular once  insulin lispro (HumaLOG) corrective regimen sliding scale   SubCutaneous every 6 hours  metoprolol tartrate 25 milliGRAM(s) Oral two times a day  multiple electrolytes Injection Type 1 1000 milliLiter(s) (150 mL/Hr) IV Continuous <Continuous>    MEDICATIONS  (PRN):  dextrose 40% Gel 15 Gram(s) Oral once PRN Blood Glucose LESS THAN 70 milliGRAM(s)/deciliter  glucagon  Injectable 1 milliGRAM(s) IntraMuscular once PRN Glucose LESS THAN 70 milligrams/deciliter  HYDROmorphone  Injectable 0.5 milliGRAM(s) IV Push every 4 hours PRN Breakthrough  ondansetron Injectable 4 milliGRAM(s) IV Push every 6 hours PRN Nausea and/or Vomiting  oxyCODONE    IR 5 milliGRAM(s) Oral every 6 hours PRN Moderate Pain (4 - 6)  oxyCODONE    IR 10 milliGRAM(s) Oral every 6 hours PRN Severe Pain (7 - 10)      Allergies    Keflex (Anaphylaxis)  penicillin (Anaphylaxis)    Intolerances        Vital Signs Last 24 Hrs  T(C): 36.5 (2019 04:00), Max: 37.7 (2019 16:00)  T(F): 97.7 (2019 04:00), Max: 99.8 (2019 16:00)  HR: 67 (2019 07:00) (63 - 90)  BP: 141/67 (2019 07:00) (130/61 - 179/76)  BP(mean): 96 (2019 07:00) (88 - 109)  RR: 10 (2019 07:00) (10 - )  SpO2: 95% (2019 07:00) (94% - 100%)    LABS:                        9.2    8.6   )-----------( 163      ( 2019 06:19 )             27.9     04-11    141  |  105  |  20.0  ----------------------------<  119<H>  3.1<L>   |  23.0  |  0.57    Ca    8.1<L>      2019 05:26  Phos  2.9     -11  Mg     1.4     11    TPro  7.5  /  Alb  4.3  /  TBili  1.2  /  DBili  x   /  AST  260<H>  /  ALT  156<H>  /  AlkPhos  158<H>  04-10      Urinalysis Basic - ( 10 Apr 2019 18:18 )    Color: Yellow / Appearance: Clear / S.010 / pH: x  Gluc: x / Ketone: Moderate  / Bili: Negative / Urobili: Negative mg/dL   Blood: x / Protein: 30 mg/dL / Nitrite: Negative   Leuk Esterase: Negative / RBC: 3-5 /HPF / WBC 0-2   Sq Epi: x / Non Sq Epi: Negative / Bacteria: Occasional        RADIOLOGY & ADDITIONAL TESTS:

## 2019-04-12 NOTE — PROGRESS NOTE ADULT - SUBJECTIVE AND OBJECTIVE BOX
INTERVAL HPI/OVERNIGHT EVENTS/SUBJECTIVE:     ICU Vital Signs Last 24 Hrs  T(C): 36.6 (2019 12:00), Max: 37.7 (2019 16:00)  T(F): 97.9 (2019 12:00), Max: 99.8 (2019 16:00)  HR: 80 (2019 14:00) (63 - 86)  BP: 168/79 (2019 14:00) (104/64 - 171/75)  BP(mean): 114 (2019 14:00) (79 - 114)  ABP: --  ABP(mean): --  RR: 18 (2019 14:00) (10 - 28)  SpO2: 98% (2019 14:00) (92% - 100%)      I&O's Detail    2019 07:01  -  2019 07:00  --------------------------------------------------------  IN:    multiple electrolytes Injection Type 1multiple electrolytes Injection Type 1: 3450 mL    Solution: 300 mL  Total IN: 3750 mL    OUT:    Voided: 2200 mL  Total OUT: 2200 mL    Total NET: 1550 mL      2019 07:01  -  2019 15:13  --------------------------------------------------------  IN:    multiple electrolytes Injection Type 1multiple electrolytes Injection Type 1: 450 mL    Solution: 250 mL  Total IN: 700 mL    OUT:    Voided: 400 mL  Total OUT: 400 mL    Total NET: 300 mL                MEDICATIONS  (STANDING):  atorvastatin 10 milliGRAM(s) Oral at bedtime  chlorhexidine 2% Cloths 1 Application(s) Topical daily  dextrose 5%. 1000 milliLiter(s) (50 mL/Hr) IV Continuous <Continuous>  dextrose 50% Injectable 12.5 Gram(s) IV Push once  dextrose 50% Injectable 25 Gram(s) IV Push once  dextrose 50% Injectable 25 Gram(s) IV Push once  enoxaparin Injectable 40 milliGRAM(s) SubCutaneous daily  influenza   Vaccine 0.5 milliLiter(s) IntraMuscular once  insulin lispro (HumaLOG) corrective regimen sliding scale   SubCutaneous every 6 hours  metoprolol tartrate 25 milliGRAM(s) Oral two times a day  pantoprazole  Injectable 40 milliGRAM(s) IV Push two times a day    MEDICATIONS  (PRN):  dextrose 40% Gel 15 Gram(s) Oral once PRN Blood Glucose LESS THAN 70 milliGRAM(s)/deciliter  glucagon  Injectable 1 milliGRAM(s) IntraMuscular once PRN Glucose LESS THAN 70 milligrams/deciliter  HYDROmorphone  Injectable 0.5 milliGRAM(s) IV Push every 4 hours PRN Breakthrough  ondansetron Injectable 4 milliGRAM(s) IV Push every 6 hours PRN Nausea and/or Vomiting  oxyCODONE    IR 5 milliGRAM(s) Oral every 6 hours PRN Moderate Pain (4 - 6)  oxyCODONE    IR 10 milliGRAM(s) Oral every 6 hours PRN Severe Pain (7 - 10)      NUTRITION/IVF:     CENTRAL LINE:  LOCATION:   DATE INSERTED:  CVP:  SCVO2:    GIBSON:   DATE INSERTED:    A-LINE:    LOCATION:   DATE INSERTED:   SVV:  CO/CI:     CHEST TUBE:  LOCATION:  DATE INSERTED: OUTPUT/24 HRS:  SUCTION/WATER SEAL:     NG/OG TUBE:  DATE INSERTED:  OUTPUT/24 HRS:    MISC:     PHYSICAL EXAM:     Gen:NAD, Well appearing, No cyanosis, Pallor.    Eyes: PERRL ~ 3mm, EOMI,     Neurological: A&Ox3, GCS 15, No focal defficit.     ENMT: Clear canals, clear throat.      Neck: Supple. NT AT, FROM no pain.  No JVD. No meningeal signs    Pulmonary: NAD, CTA, = BL .      Cardiovascular: RRR, S1, S2, No Murmurs, rubs or gallops noted.    Gastrointestinal:ND, Soft, NT.    Genitourinary:     Back:     Extremities: NT, AT, no edema, erythema or palpable cord noted.  FROM, = 2+ pulses throughout.    Skin:     Musculoskeletal:          LABS:  CBC Full  -  ( 2019 06:19 )  WBC Count : 8.6 K/uL  RBC Count : 3.10 M/uL  Hemoglobin : 9.2 g/dL  Hematocrit : 27.9 %  Platelet Count - Automated : 163 K/uL  Mean Cell Volume : 90.0 fl  Mean Cell Hemoglobin : 29.7 pg  Mean Cell Hemoglobin Concentration : 33.0 g/dL  Auto Neutrophil # : 6.0 K/uL  Auto Lymphocyte # : 2.0 K/uL  Auto Monocyte # : 0.5 K/uL  Auto Eosinophil # : 0.1 K/uL  Auto Basophil # : x  Auto Neutrophil % : 70.5 %  Auto Lymphocyte % : 22.9 %  Auto Monocyte % : 5.5 %  Auto Eosinophil % : 0.9 %  Auto Basophil % : x        140  |  104  |  14.0  ----------------------------<  73  4.0   |  25.0  |  0.61    Ca    8.5<L>      2019 06:19  Phos  2.1       Mg     2.0         TPro  5.9<L>  /  Alb  3.3  /  TBili  1.1  /  DBili  0.6<H>  /  AST  73<H>  /  ALT  141<H>  /  AlkPhos  159<H>        Urinalysis Basic - ( 10 Apr 2019 18:18 )    Color: Yellow / Appearance: Clear / S.010 / pH: x  Gluc: x / Ketone: Moderate  / Bili: Negative / Urobili: Negative mg/dL   Blood: x / Protein: 30 mg/dL / Nitrite: Negative   Leuk Esterase: Negative / RBC: 3-5 /HPF / WBC 0-2   Sq Epi: x / Non Sq Epi: Negative / Bacteria: Occasional      RECENT CULTURES:  04-10 .Blood XXXX XXXX   No growth at 48 hours    04-10 .Blood XXXX XXXX   No growth at 48 hours        LIVER FUNCTIONS - ( 2019 06:19 )  Alb: 3.3 g/dL / Pro: 5.9 g/dL / ALK PHOS: 159 U/L / ALT: 141 U/L / AST: 73 U/L / GGT: x               CAPILLARY BLOOD GLUCOSE      RADIOLOGY & ADDITIONAL STUDIES:    ASSESSMENT/PLAN:  68yFemale presenting with:        Neurological:    ENMT:    Neck:    Pulmonary:    Cardiovascular:    Gastrointestinal:    Genitourinary:    Heme:    ID:    Skin:    Lines/ Tubes:    Dispo:            CRITICAL CARE TIME SPENT: INTERVAL HPI/OVERNIGHT EVENTS/SUBJECTIVE:  Tolerated CLD diet. Ordered for MRCP but MR Tech says they are very backed up.  Denies ab pain, NV, CP, SOB or other CO.      ICU Vital Signs Last 24 Hrs  T(C): 36.6 (2019 12:00), Max: 37.7 (2019 16:00)  T(F): 97.9 (2019 12:00), Max: 99.8 (2019 16:00)  HR: 80 (2019 14:00) (63 - 86)  BP: 168/79 (2019 14:00) (104/64 - 171/75)  BP(mean): 114 (2019 14:00) (79 - 114)  ABP: --  ABP(mean): --  RR: 18 (2019 14:00) (10 - 28)  SpO2: 98% (2019 14:00) (92% - 100%)      I&O's Detail    2019 07:01  -  2019 07:00  --------------------------------------------------------  IN:    multiple electrolytes Injection Type 1multiple electrolytes Injection Type 1: 3450 mL    Solution: 300 mL  Total IN: 3750 mL    OUT:    Voided: 2200 mL  Total OUT: 2200 mL    Total NET: 1550 mL      2019 07:01  -  2019 15:13  --------------------------------------------------------  IN:    multiple electrolytes Injection Type 1multiple electrolytes Injection Type 1: 450 mL    Solution: 250 mL  Total IN: 700 mL    OUT:    Voided: 400 mL  Total OUT: 400 mL    Total NET: 300 mL                MEDICATIONS  (STANDING):  atorvastatin 10 milliGRAM(s) Oral at bedtime  chlorhexidine 2% Cloths 1 Application(s) Topical daily  dextrose 5%. 1000 milliLiter(s) (50 mL/Hr) IV Continuous <Continuous>  dextrose 50% Injectable 12.5 Gram(s) IV Push once  dextrose 50% Injectable 25 Gram(s) IV Push once  dextrose 50% Injectable 25 Gram(s) IV Push once  enoxaparin Injectable 40 milliGRAM(s) SubCutaneous daily  influenza   Vaccine 0.5 milliLiter(s) IntraMuscular once  insulin lispro (HumaLOG) corrective regimen sliding scale   SubCutaneous every 6 hours  metoprolol tartrate 25 milliGRAM(s) Oral two times a day  pantoprazole  Injectable 40 milliGRAM(s) IV Push two times a day    MEDICATIONS  (PRN):  dextrose 40% Gel 15 Gram(s) Oral once PRN Blood Glucose LESS THAN 70 milliGRAM(s)/deciliter  glucagon  Injectable 1 milliGRAM(s) IntraMuscular once PRN Glucose LESS THAN 70 milligrams/deciliter  HYDROmorphone  Injectable 0.5 milliGRAM(s) IV Push every 4 hours PRN Breakthrough  ondansetron Injectable 4 milliGRAM(s) IV Push every 6 hours PRN Nausea and/or Vomiting  oxyCODONE    IR 5 milliGRAM(s) Oral every 6 hours PRN Moderate Pain (4 - 6)  oxyCODONE    IR 10 milliGRAM(s) Oral every 6 hours PRN Severe Pain (7 - 10)      NUTRITION/IVF: CLD    PHYSICAL EXAM:     Gen: NAD, Well appearing, No cyanosis, Pallor.    Eyes: PERRL ~ 3mm, EOMI,     Neurological: A&Ox3, GCS 15, No focal deficit.     Neck: Supple. NT AT, FROM no pain.  No JVD. No meningeal signs    Pulmonary: NAD, CTA, = BL .      Cardiovascular: RRR, S1, S2, No Murmurs, rubs or gallops noted.    Gastrointestinal: ND, Soft, NT.    Extremities: NT, AT, no edema, erythema or palpable cord noted.  FROM, = 2+ pulses throughout.        LABS:  CBC Full  -  ( 2019 06:19 )  WBC Count : 8.6 K/uL  RBC Count : 3.10 M/uL  Hemoglobin : 9.2 g/dL  Hematocrit : 27.9 %  Platelet Count - Automated : 163 K/uL  Mean Cell Volume : 90.0 fl  Mean Cell Hemoglobin : 29.7 pg  Mean Cell Hemoglobin Concentration : 33.0 g/dL  Auto Neutrophil # : 6.0 K/uL  Auto Lymphocyte # : 2.0 K/uL  Auto Monocyte # : 0.5 K/uL  Auto Eosinophil # : 0.1 K/uL  Auto Basophil # : x  Auto Neutrophil % : 70.5 %  Auto Lymphocyte % : 22.9 %  Auto Monocyte % : 5.5 %  Auto Eosinophil % : 0.9 %  Auto Basophil % : x        140  |  104  |  14.0  ----------------------------<  73  4.0   |  25.0  |  0.61    Ca    8.5<L>      2019 06:19  Phos  2.1       Mg     2.0         TPro  5.9<L>  /  Alb  3.3  /  TBili  1.1  /  DBili  0.6<H>  /  AST  73<H>  /  ALT  141<H>  /  AlkPhos  159<H>        Urinalysis Basic - ( 10 Apr 2019 18:18 )    Color: Yellow / Appearance: Clear / S.010 / pH: x  Gluc: x / Ketone: Moderate  / Bili: Negative / Urobili: Negative mg/dL   Blood: x / Protein: 30 mg/dL / Nitrite: Negative   Leuk Esterase: Negative / RBC: 3-5 /HPF / WBC 0-2   Sq Epi: x / Non Sq Epi: Negative / Bacteria: Occasional      RECENT CULTURES:  04-10 .Blood XXXX XXXX   No growth at 48 hours    04-10 .Blood XXXX XXXX   No growth at 48 hours        LIVER FUNCTIONS - ( 2019 06:19 )  Alb: 3.3 g/dL / Pro: 5.9 g/dL / ALK PHOS: 159 U/L / ALT: 141 U/L / AST: 73 U/L / GGT: x               CAPILLARY BLOOD GLUCOSE      RADIOLOGY & ADDITIONAL STUDIES:    ASSESSMENT/PLAN:  68yFemale presenting with:  Medication induced Pancreatitis and Gastritis.     Neurological:  PRN Analgesics     Pulmonary: Urge IS    Cardiovascular: Metoprolol and lipitor.     Gastrointestinal: Will ADAT.  Will get MRI next available.     Genitourinary: Will keep Moran for accurate I/O.  Remove tomorrow after MRCP if still stable.     Heme: Loevenox    ID: None    Lines/ Tubes: Plan as above    Dispo: Will remain in SICU today.  Downgrade tomorrow if stable.      CARE TIME SPENT: 26 minutes.

## 2019-04-13 LAB
ALBUMIN SERPL ELPH-MCNC: 3.2 G/DL — LOW (ref 3.3–5.2)
ALP SERPL-CCNC: 134 U/L — HIGH (ref 40–120)
ALT FLD-CCNC: 91 U/L — HIGH
ANION GAP SERPL CALC-SCNC: 11 MMOL/L — SIGNIFICANT CHANGE UP (ref 5–17)
AST SERPL-CCNC: 34 U/L — HIGH
BASOPHILS # BLD AUTO: 0 K/UL — SIGNIFICANT CHANGE UP (ref 0–0.2)
BASOPHILS NFR BLD AUTO: 0.2 % — SIGNIFICANT CHANGE UP (ref 0–2)
BILIRUB DIRECT SERPL-MCNC: 0.3 MG/DL — SIGNIFICANT CHANGE UP (ref 0–0.3)
BILIRUB INDIRECT FLD-MCNC: 0.3 MG/DL — SIGNIFICANT CHANGE UP (ref 0.2–1)
BILIRUB SERPL-MCNC: 0.6 MG/DL — SIGNIFICANT CHANGE UP (ref 0.4–2)
BUN SERPL-MCNC: 12 MG/DL — SIGNIFICANT CHANGE UP (ref 8–20)
CALCIUM SERPL-MCNC: 8.6 MG/DL — SIGNIFICANT CHANGE UP (ref 8.6–10.2)
CHLORIDE SERPL-SCNC: 106 MMOL/L — SIGNIFICANT CHANGE UP (ref 98–107)
CO2 SERPL-SCNC: 25 MMOL/L — SIGNIFICANT CHANGE UP (ref 22–29)
CREAT SERPL-MCNC: 0.72 MG/DL — SIGNIFICANT CHANGE UP (ref 0.5–1.3)
EOSINOPHIL # BLD AUTO: 0.1 K/UL — SIGNIFICANT CHANGE UP (ref 0–0.5)
EOSINOPHIL NFR BLD AUTO: 1.1 % — SIGNIFICANT CHANGE UP (ref 0–6)
GLUCOSE BLDC GLUCOMTR-MCNC: 114 MG/DL — HIGH (ref 70–99)
GLUCOSE BLDC GLUCOMTR-MCNC: 183 MG/DL — HIGH (ref 70–99)
GLUCOSE BLDC GLUCOMTR-MCNC: 226 MG/DL — HIGH (ref 70–99)
GLUCOSE BLDC GLUCOMTR-MCNC: 227 MG/DL — HIGH (ref 70–99)
GLUCOSE SERPL-MCNC: 89 MG/DL — SIGNIFICANT CHANGE UP (ref 70–115)
HCT VFR BLD CALC: 26.2 % — LOW (ref 37–47)
HGB BLD-MCNC: 8.9 G/DL — LOW (ref 12–16)
LIDOCAIN IGE QN: 210 U/L — HIGH (ref 22–51)
LYMPHOCYTES # BLD AUTO: 1.9 K/UL — SIGNIFICANT CHANGE UP (ref 1–4.8)
LYMPHOCYTES # BLD AUTO: 30.5 % — SIGNIFICANT CHANGE UP (ref 20–55)
MAGNESIUM SERPL-MCNC: 1.8 MG/DL — SIGNIFICANT CHANGE UP (ref 1.6–2.6)
MCHC RBC-ENTMCNC: 30.1 PG — SIGNIFICANT CHANGE UP (ref 27–31)
MCHC RBC-ENTMCNC: 34 G/DL — SIGNIFICANT CHANGE UP (ref 32–36)
MCV RBC AUTO: 88.5 FL — SIGNIFICANT CHANGE UP (ref 81–99)
MONOCYTES # BLD AUTO: 0.5 K/UL — SIGNIFICANT CHANGE UP (ref 0–0.8)
MONOCYTES NFR BLD AUTO: 7.7 % — SIGNIFICANT CHANGE UP (ref 3–10)
NEUTROPHILS # BLD AUTO: 3.8 K/UL — SIGNIFICANT CHANGE UP (ref 1.8–8)
NEUTROPHILS NFR BLD AUTO: 60.3 % — SIGNIFICANT CHANGE UP (ref 37–73)
PHOSPHATE SERPL-MCNC: 2.6 MG/DL — SIGNIFICANT CHANGE UP (ref 2.4–4.7)
PLATELET # BLD AUTO: 164 K/UL — SIGNIFICANT CHANGE UP (ref 150–400)
POTASSIUM SERPL-MCNC: 3.6 MMOL/L — SIGNIFICANT CHANGE UP (ref 3.5–5.3)
POTASSIUM SERPL-SCNC: 3.6 MMOL/L — SIGNIFICANT CHANGE UP (ref 3.5–5.3)
PROT SERPL-MCNC: 5.7 G/DL — LOW (ref 6.6–8.7)
RBC # BLD: 2.96 M/UL — LOW (ref 4.4–5.2)
RBC # FLD: 12.5 % — SIGNIFICANT CHANGE UP (ref 11–15.6)
SODIUM SERPL-SCNC: 142 MMOL/L — SIGNIFICANT CHANGE UP (ref 135–145)
WBC # BLD: 6.4 K/UL — SIGNIFICANT CHANGE UP (ref 4.8–10.8)
WBC # FLD AUTO: 6.4 K/UL — SIGNIFICANT CHANGE UP (ref 4.8–10.8)

## 2019-04-13 PROCEDURE — 74183 MRI ABD W/O CNTR FLWD CNTR: CPT | Mod: 26

## 2019-04-13 PROCEDURE — 99232 SBSQ HOSP IP/OBS MODERATE 35: CPT

## 2019-04-13 RX ORDER — POTASSIUM PHOSPHATE, MONOBASIC POTASSIUM PHOSPHATE, DIBASIC 236; 224 MG/ML; MG/ML
15 INJECTION, SOLUTION INTRAVENOUS ONCE
Qty: 0 | Refills: 0 | Status: COMPLETED | OUTPATIENT
Start: 2019-04-13 | End: 2019-04-13

## 2019-04-13 RX ORDER — POTASSIUM CHLORIDE 20 MEQ
20 PACKET (EA) ORAL ONCE
Qty: 0 | Refills: 0 | Status: COMPLETED | OUTPATIENT
Start: 2019-04-13 | End: 2019-04-13

## 2019-04-13 RX ORDER — MAGNESIUM SULFATE 500 MG/ML
2 VIAL (ML) INJECTION ONCE
Qty: 0 | Refills: 0 | Status: COMPLETED | OUTPATIENT
Start: 2019-04-13 | End: 2019-04-13

## 2019-04-13 RX ADMIN — ENOXAPARIN SODIUM 40 MILLIGRAM(S): 100 INJECTION SUBCUTANEOUS at 12:47

## 2019-04-13 RX ADMIN — Medication 1: at 23:06

## 2019-04-13 RX ADMIN — POTASSIUM PHOSPHATE, MONOBASIC POTASSIUM PHOSPHATE, DIBASIC 62.5 MILLIMOLE(S): 236; 224 INJECTION, SOLUTION INTRAVENOUS at 14:33

## 2019-04-13 RX ADMIN — HYDROMORPHONE HYDROCHLORIDE 0.5 MILLIGRAM(S): 2 INJECTION INTRAMUSCULAR; INTRAVENOUS; SUBCUTANEOUS at 20:17

## 2019-04-13 RX ADMIN — OXYCODONE HYDROCHLORIDE 10 MILLIGRAM(S): 5 TABLET ORAL at 23:30

## 2019-04-13 RX ADMIN — PANTOPRAZOLE SODIUM 40 MILLIGRAM(S): 20 TABLET, DELAYED RELEASE ORAL at 19:00

## 2019-04-13 RX ADMIN — OXYCODONE HYDROCHLORIDE 5 MILLIGRAM(S): 5 TABLET ORAL at 09:43

## 2019-04-13 RX ADMIN — CHLORHEXIDINE GLUCONATE 1 APPLICATION(S): 213 SOLUTION TOPICAL at 12:47

## 2019-04-13 RX ADMIN — HYDROMORPHONE HYDROCHLORIDE 0.5 MILLIGRAM(S): 2 INJECTION INTRAMUSCULAR; INTRAVENOUS; SUBCUTANEOUS at 04:54

## 2019-04-13 RX ADMIN — OXYCODONE HYDROCHLORIDE 10 MILLIGRAM(S): 5 TABLET ORAL at 00:38

## 2019-04-13 RX ADMIN — Medication 50 GRAM(S): at 14:33

## 2019-04-13 RX ADMIN — OXYCODONE HYDROCHLORIDE 10 MILLIGRAM(S): 5 TABLET ORAL at 23:12

## 2019-04-13 RX ADMIN — Medication 20 MILLIEQUIVALENT(S): at 14:33

## 2019-04-13 RX ADMIN — Medication 25 MILLIGRAM(S): at 18:56

## 2019-04-13 RX ADMIN — PANTOPRAZOLE SODIUM 40 MILLIGRAM(S): 20 TABLET, DELAYED RELEASE ORAL at 06:53

## 2019-04-13 RX ADMIN — ATORVASTATIN CALCIUM 10 MILLIGRAM(S): 80 TABLET, FILM COATED ORAL at 23:06

## 2019-04-13 RX ADMIN — HYDROMORPHONE HYDROCHLORIDE 0.5 MILLIGRAM(S): 2 INJECTION INTRAMUSCULAR; INTRAVENOUS; SUBCUTANEOUS at 09:43

## 2019-04-13 RX ADMIN — OXYCODONE HYDROCHLORIDE 10 MILLIGRAM(S): 5 TABLET ORAL at 08:00

## 2019-04-13 RX ADMIN — OXYCODONE HYDROCHLORIDE 10 MILLIGRAM(S): 5 TABLET ORAL at 06:59

## 2019-04-13 RX ADMIN — Medication 25 MILLIGRAM(S): at 06:53

## 2019-04-13 RX ADMIN — HYDROMORPHONE HYDROCHLORIDE 0.5 MILLIGRAM(S): 2 INJECTION INTRAMUSCULAR; INTRAVENOUS; SUBCUTANEOUS at 22:09

## 2019-04-13 RX ADMIN — Medication 2: at 18:56

## 2019-04-13 RX ADMIN — HYDROMORPHONE HYDROCHLORIDE 0.5 MILLIGRAM(S): 2 INJECTION INTRAMUSCULAR; INTRAVENOUS; SUBCUTANEOUS at 04:37

## 2019-04-13 RX ADMIN — OXYCODONE HYDROCHLORIDE 10 MILLIGRAM(S): 5 TABLET ORAL at 14:56

## 2019-04-13 NOTE — PROGRESS NOTE ADULT - SUBJECTIVE AND OBJECTIVE BOX
Patient seen and examined; chart reviewed.  Was fed yesterday and tolerated low fat diet OK.  Pain is gradually less each day.  No fever.  no pruritis.  MRCP not yet done.  OOB to chair yesterday.  Moves bowel OK.      PAST MEDICAL & SURGICAL HISTORY:  Pancreatitis  Herniated lumbar intervertebral disc  Endogenous hyperlipemia  HTN (hypertension)  Diabetes  Status post cataract extraction: left eye 8/3/2015  S/P tonsillectomy  S/P cholecystectomy  S/P appendectomy      ROS:  No Heartburn, regurgitation, dysphagia, odynophagia.  No dyspepsia  No abdominal pain.    No Nausea, vomiting.  No Bleeding.  No hematemesis.   No diarrhea.    No hematochesia.  No weight loss, anorexia.  No edema.      MEDICATIONS  (STANDING):  atorvastatin 10 milliGRAM(s) Oral at bedtime  chlorhexidine 2% Cloths 1 Application(s) Topical daily  dextrose 5%. 1000 milliLiter(s) (50 mL/Hr) IV Continuous <Continuous>  dextrose 50% Injectable 12.5 Gram(s) IV Push once  dextrose 50% Injectable 25 Gram(s) IV Push once  dextrose 50% Injectable 25 Gram(s) IV Push once  enoxaparin Injectable 40 milliGRAM(s) SubCutaneous daily  influenza   Vaccine 0.5 milliLiter(s) IntraMuscular once  insulin lispro (HumaLOG) corrective regimen sliding scale   SubCutaneous every 6 hours  metoprolol tartrate 25 milliGRAM(s) Oral two times a day  pantoprazole  Injectable 40 milliGRAM(s) IV Push every 12 hours    MEDICATIONS  (PRN):  dextrose 40% Gel 15 Gram(s) Oral once PRN Blood Glucose LESS THAN 70 milliGRAM(s)/deciliter  glucagon  Injectable 1 milliGRAM(s) IntraMuscular once PRN Glucose LESS THAN 70 milligrams/deciliter  HYDROmorphone  Injectable 0.5 milliGRAM(s) IV Push every 4 hours PRN Breakthrough  ondansetron Injectable 4 milliGRAM(s) IV Push every 6 hours PRN Nausea and/or Vomiting  oxyCODONE    IR 5 milliGRAM(s) Oral every 6 hours PRN Moderate Pain (4 - 6)  oxyCODONE    IR 10 milliGRAM(s) Oral every 6 hours PRN Severe Pain (7 - 10)      Allergies    Keflex (Anaphylaxis)  penicillin (Anaphylaxis)    Intolerances        Vital Signs Last 24 Hrs  T(C): 36.6 (13 Apr 2019 08:00), Max: 37.3 (12 Apr 2019 21:00)  T(F): 97.8 (13 Apr 2019 08:00), Max: 99.1 (12 Apr 2019 21:00)  HR: 66 (13 Apr 2019 09:00) (63 - 85)  BP: 152/72 (13 Apr 2019 09:00) (104/64 - 168/79)  BP(mean): 103 (13 Apr 2019 09:00) (79 - 114)  RR: 14 (13 Apr 2019 09:00) (12 - 24)  SpO2: 95% (13 Apr 2019 09:00) (93% - 99%)    PHYSICAL EXAM:    GENERAL: NAD, well-groomed, well-developed  HEAD:  Atraumatic, Normocephalic  EYES: EOMI, PERRLA, conjunctiva and sclera clear  ENMT: No tonsillar erythema, exudates, or enlargement; Moist mucous membranes, Good dentition, No lesions  NECK: Supple, No JVD, Normal thyroid  CHEST/LUNG: Clear to percussion bilaterally; No rales, rhonchi, wheezing, or rubs  HEART: Regular rate and rhythm; No murmurs, rubs, or gallops  ABDOMEN: Soft, Nontender, Nondistended; Bowel sounds present; Old scars.    EXTREMITIES:  2+ Peripheral Pulses, No clubbing, cyanosis, or edema  LYMPH: No lymphadenopathy noted  SKIN: No rashes or lesions      LABS:                        8.9    6.4   )-----------( 164      ( 13 Apr 2019 05:14 )             26.2     04-13    142  |  106  |  12.0  ----------------------------<  89  3.6   |  25.0  |  0.72    Ca    8.6      13 Apr 2019 05:14  Phos  2.6     04-13  Mg     1.8     04-13    TPro  5.7<L>  /  Alb  3.2<L>  /  TBili  0.6  /  DBili  0.3  /  AST  34<H>  /  ALT  91<H>  /  AlkPhos  134<H>  04-13             RADIOLOGY & ADDITIONAL STUDIES:Sono:  Absent GB.  8 mm CBD.  CT same with acute interstitial pancreatitis.  Some mely duodenal inflammatory fluid.  Neg Bile ducts and liver.

## 2019-04-13 NOTE — PROGRESS NOTE ADULT - SUBJECTIVE AND OBJECTIVE BOX
24h Events:  Started on regular diet. Hemodynamically well. MRI ordered.     Subjective:  Pt complaining of slight increase in abdominal pain since yesterday. Denies n/v, chest pain, SOB    ICU Vital Signs Last 24 Hrs  T(C): 36.8 (13 Apr 2019 04:00), Max: 37.3 (12 Apr 2019 21:00)  T(F): 98.2 (13 Apr 2019 04:00), Max: 99.1 (12 Apr 2019 21:00)  HR: 63 (13 Apr 2019 04:01) (63 - 86)  BP: 114/58 (13 Apr 2019 04:01) (104/64 - 171/75)  BP(mean): 82 (13 Apr 2019 04:01) (79 - 114)  ABP: --  ABP(mean): --  RR: 15 (13 Apr 2019 04:01) (10 - 24)  SpO2: 95% (13 Apr 2019 04:01) (92% - 99%)      I&O's Detail    11 Apr 2019 07:01  -  12 Apr 2019 07:00  --------------------------------------------------------  IN:    multiple electrolytes Injection Type 1multiple electrolytes Injection Type 1: 3450 mL    Solution: 300 mL  Total IN: 3750 mL    OUT:    Voided: 2200 mL  Total OUT: 2200 mL    Total NET: 1550 mL      12 Apr 2019 07:01  -  13 Apr 2019 05:22  --------------------------------------------------------  IN:    multiple electrolytes Injection Type 1multiple electrolytes Injection Type 1: 450 mL    Solution: 250 mL  Total IN: 700 mL    OUT:    Voided: 900 mL  Total OUT: 900 mL    Total NET: -200 mL              MEDICATIONS  (STANDING):  atorvastatin 10 milliGRAM(s) Oral at bedtime  chlorhexidine 2% Cloths 1 Application(s) Topical daily  dextrose 5%. 1000 milliLiter(s) (50 mL/Hr) IV Continuous <Continuous>  dextrose 50% Injectable 12.5 Gram(s) IV Push once  dextrose 50% Injectable 25 Gram(s) IV Push once  dextrose 50% Injectable 25 Gram(s) IV Push once  enoxaparin Injectable 40 milliGRAM(s) SubCutaneous daily  influenza   Vaccine 0.5 milliLiter(s) IntraMuscular once  insulin lispro (HumaLOG) corrective regimen sliding scale   SubCutaneous every 6 hours  metoprolol tartrate 25 milliGRAM(s) Oral two times a day  pantoprazole  Injectable 40 milliGRAM(s) IV Push every 12 hours    MEDICATIONS  (PRN):  dextrose 40% Gel 15 Gram(s) Oral once PRN Blood Glucose LESS THAN 70 milliGRAM(s)/deciliter  glucagon  Injectable 1 milliGRAM(s) IntraMuscular once PRN Glucose LESS THAN 70 milligrams/deciliter  HYDROmorphone  Injectable 0.5 milliGRAM(s) IV Push every 4 hours PRN Breakthrough  ondansetron Injectable 4 milliGRAM(s) IV Push every 6 hours PRN Nausea and/or Vomiting  oxyCODONE    IR 5 milliGRAM(s) Oral every 6 hours PRN Moderate Pain (4 - 6)  oxyCODONE    IR 10 milliGRAM(s) Oral every 6 hours PRN Severe Pain (7 - 10)          Physical Exam:    Gen: Resting comfortably in bed    HEENT: PERRL, EOMI    Neurological: Alert and oriented without focal deficit    Neck: Trachea midline, no evidence of JVD, FROM without pain, neck symmetric    Pulmonary: CTAB with decreased breath sounds at the bases    Cardiovascular: S1S2    Gastrointestinal: mild supra-umbilical tenderness    : Garcia in place draining yellow urine    Skin: Intact    Musculoskeletal: FROM without pain, no deformity or areas of tenderness    LABS:  Pending      ASSESSMENT/PLAN:  68y Female admitted with pancreatitis possibly secondary to glipizide    Neuro: Pain control, delirium precautions, sleep hygiene     CV: Continue non-invasive blood pressure monitoring     Pulm: Encourage incentive spirometry, OOB as tolerated     GI/Nutrition: Continue diet, will de-escalate diet if pain worsens    /Renal: Possibly DC garcia today    ID: No issues    Endo: Continue ISS, will restart metformin as outpt, no plan to resume glipizide    Skin: Repositioning for DTI prevention while in bed    Heme/DVT Prophylaxis: SCDs, lovenox    Dispo: Possibly transfer to floors today

## 2019-04-14 LAB
ANION GAP SERPL CALC-SCNC: 13 MMOL/L — SIGNIFICANT CHANGE UP (ref 5–17)
BASOPHILS # BLD AUTO: 0 K/UL — SIGNIFICANT CHANGE UP (ref 0–0.2)
BASOPHILS NFR BLD AUTO: 0.2 % — SIGNIFICANT CHANGE UP (ref 0–2)
BUN SERPL-MCNC: 7 MG/DL — LOW (ref 8–20)
C PEPTIDE SERPL-MCNC: 1.9 NG/ML — SIGNIFICANT CHANGE UP (ref 1.1–4.4)
CALCIUM SERPL-MCNC: 9.4 MG/DL — SIGNIFICANT CHANGE UP (ref 8.6–10.2)
CHLORIDE SERPL-SCNC: 102 MMOL/L — SIGNIFICANT CHANGE UP (ref 98–107)
CO2 SERPL-SCNC: 24 MMOL/L — SIGNIFICANT CHANGE UP (ref 22–29)
CREAT SERPL-MCNC: 0.72 MG/DL — SIGNIFICANT CHANGE UP (ref 0.5–1.3)
EOSINOPHIL # BLD AUTO: 0.1 K/UL — SIGNIFICANT CHANGE UP (ref 0–0.5)
EOSINOPHIL NFR BLD AUTO: 2 % — SIGNIFICANT CHANGE UP (ref 0–6)
GLUCOSE BLDC GLUCOMTR-MCNC: 120 MG/DL — HIGH (ref 70–99)
GLUCOSE BLDC GLUCOMTR-MCNC: 191 MG/DL — HIGH (ref 70–99)
GLUCOSE BLDC GLUCOMTR-MCNC: 227 MG/DL — HIGH (ref 70–99)
GLUCOSE SERPL-MCNC: 128 MG/DL — HIGH (ref 70–115)
HCT VFR BLD CALC: 28.4 % — LOW (ref 37–47)
HGB BLD-MCNC: 9.5 G/DL — LOW (ref 12–16)
LYMPHOCYTES # BLD AUTO: 1.6 K/UL — SIGNIFICANT CHANGE UP (ref 1–4.8)
LYMPHOCYTES # BLD AUTO: 29.4 % — SIGNIFICANT CHANGE UP (ref 20–55)
MAGNESIUM SERPL-MCNC: 1.8 MG/DL — SIGNIFICANT CHANGE UP (ref 1.8–2.6)
MCHC RBC-ENTMCNC: 29.6 PG — SIGNIFICANT CHANGE UP (ref 27–31)
MCHC RBC-ENTMCNC: 33.5 G/DL — SIGNIFICANT CHANGE UP (ref 32–36)
MCV RBC AUTO: 88.5 FL — SIGNIFICANT CHANGE UP (ref 81–99)
MONOCYTES # BLD AUTO: 0.5 K/UL — SIGNIFICANT CHANGE UP (ref 0–0.8)
MONOCYTES NFR BLD AUTO: 9.3 % — SIGNIFICANT CHANGE UP (ref 3–10)
NEUTROPHILS # BLD AUTO: 3.2 K/UL — SIGNIFICANT CHANGE UP (ref 1.8–8)
NEUTROPHILS NFR BLD AUTO: 58.9 % — SIGNIFICANT CHANGE UP (ref 37–73)
PHOSPHATE SERPL-MCNC: 3.3 MG/DL — SIGNIFICANT CHANGE UP (ref 2.4–4.7)
PLATELET # BLD AUTO: 214 K/UL — SIGNIFICANT CHANGE UP (ref 150–400)
POTASSIUM SERPL-MCNC: 3.8 MMOL/L — SIGNIFICANT CHANGE UP (ref 3.5–5.3)
POTASSIUM SERPL-SCNC: 3.8 MMOL/L — SIGNIFICANT CHANGE UP (ref 3.5–5.3)
RBC # BLD: 3.21 M/UL — LOW (ref 4.4–5.2)
RBC # FLD: 12.3 % — SIGNIFICANT CHANGE UP (ref 11–15.6)
SODIUM SERPL-SCNC: 139 MMOL/L — SIGNIFICANT CHANGE UP (ref 135–145)
WBC # BLD: 5.4 K/UL — SIGNIFICANT CHANGE UP (ref 4.8–10.8)
WBC # FLD AUTO: 5.4 K/UL — SIGNIFICANT CHANGE UP (ref 4.8–10.8)

## 2019-04-14 PROCEDURE — 99231 SBSQ HOSP IP/OBS SF/LOW 25: CPT

## 2019-04-14 PROCEDURE — 99232 SBSQ HOSP IP/OBS MODERATE 35: CPT

## 2019-04-14 RX ORDER — METFORMIN HYDROCHLORIDE 850 MG/1
1000 TABLET ORAL
Qty: 0 | Refills: 0 | Status: DISCONTINUED | OUTPATIENT
Start: 2019-04-14 | End: 2019-04-15

## 2019-04-14 RX ORDER — MAGNESIUM OXIDE 400 MG ORAL TABLET 241.3 MG
400 TABLET ORAL ONCE
Qty: 0 | Refills: 0 | Status: COMPLETED | OUTPATIENT
Start: 2019-04-14 | End: 2019-04-14

## 2019-04-14 RX ORDER — POTASSIUM CHLORIDE 20 MEQ
20 PACKET (EA) ORAL ONCE
Qty: 0 | Refills: 0 | Status: COMPLETED | OUTPATIENT
Start: 2019-04-14 | End: 2019-04-14

## 2019-04-14 RX ADMIN — OXYCODONE HYDROCHLORIDE 10 MILLIGRAM(S): 5 TABLET ORAL at 14:00

## 2019-04-14 RX ADMIN — Medication 2: at 17:38

## 2019-04-14 RX ADMIN — OXYCODONE HYDROCHLORIDE 10 MILLIGRAM(S): 5 TABLET ORAL at 23:00

## 2019-04-14 RX ADMIN — Medication 25 MILLIGRAM(S): at 05:18

## 2019-04-14 RX ADMIN — ATORVASTATIN CALCIUM 10 MILLIGRAM(S): 80 TABLET, FILM COATED ORAL at 21:19

## 2019-04-14 RX ADMIN — Medication 25 MILLIGRAM(S): at 17:39

## 2019-04-14 RX ADMIN — ENOXAPARIN SODIUM 40 MILLIGRAM(S): 100 INJECTION SUBCUTANEOUS at 13:01

## 2019-04-14 RX ADMIN — OXYCODONE HYDROCHLORIDE 10 MILLIGRAM(S): 5 TABLET ORAL at 21:19

## 2019-04-14 RX ADMIN — HYDROMORPHONE HYDROCHLORIDE 0.5 MILLIGRAM(S): 2 INJECTION INTRAMUSCULAR; INTRAVENOUS; SUBCUTANEOUS at 09:27

## 2019-04-14 RX ADMIN — HYDROMORPHONE HYDROCHLORIDE 0.5 MILLIGRAM(S): 2 INJECTION INTRAMUSCULAR; INTRAVENOUS; SUBCUTANEOUS at 09:45

## 2019-04-14 RX ADMIN — HYDROMORPHONE HYDROCHLORIDE 0.5 MILLIGRAM(S): 2 INJECTION INTRAMUSCULAR; INTRAVENOUS; SUBCUTANEOUS at 17:39

## 2019-04-14 RX ADMIN — OXYCODONE HYDROCHLORIDE 10 MILLIGRAM(S): 5 TABLET ORAL at 13:00

## 2019-04-14 RX ADMIN — OXYCODONE HYDROCHLORIDE 10 MILLIGRAM(S): 5 TABLET ORAL at 05:19

## 2019-04-14 RX ADMIN — CHLORHEXIDINE GLUCONATE 1 APPLICATION(S): 213 SOLUTION TOPICAL at 13:12

## 2019-04-14 RX ADMIN — MAGNESIUM OXIDE 400 MG ORAL TABLET 400 MILLIGRAM(S): 241.3 TABLET ORAL at 13:10

## 2019-04-14 RX ADMIN — METFORMIN HYDROCHLORIDE 1000 MILLIGRAM(S): 850 TABLET ORAL at 17:38

## 2019-04-14 RX ADMIN — PANTOPRAZOLE SODIUM 40 MILLIGRAM(S): 20 TABLET, DELAYED RELEASE ORAL at 18:24

## 2019-04-14 RX ADMIN — Medication 20 MILLIEQUIVALENT(S): at 13:10

## 2019-04-14 RX ADMIN — OXYCODONE HYDROCHLORIDE 10 MILLIGRAM(S): 5 TABLET ORAL at 06:05

## 2019-04-14 RX ADMIN — PANTOPRAZOLE SODIUM 40 MILLIGRAM(S): 20 TABLET, DELAYED RELEASE ORAL at 05:19

## 2019-04-14 RX ADMIN — HYDROMORPHONE HYDROCHLORIDE 0.5 MILLIGRAM(S): 2 INJECTION INTRAMUSCULAR; INTRAVENOUS; SUBCUTANEOUS at 17:55

## 2019-04-14 RX ADMIN — Medication 1: at 13:11

## 2019-04-14 NOTE — PROGRESS NOTE ADULT - SUBJECTIVE AND OBJECTIVE BOX
INTERVAL HPI/OVERNIGHT EVENTS:  Patient was transferred from SICU with no major complications nor issues. Patient tolerating a regular diet, denies current nausea nor vomiting. She notes mild to moderate pain but states that it has greatly improved. Yesterday patient underwent an MRCP which showed normal pancreatic anatomy and pancreatic ducts.      MEDICATIONS  (STANDING):  atorvastatin 10 milliGRAM(s) Oral at bedtime  chlorhexidine 2% Cloths 1 Application(s) Topical daily  dextrose 5%. 1000 milliLiter(s) (50 mL/Hr) IV Continuous <Continuous>  dextrose 50% Injectable 12.5 Gram(s) IV Push once  dextrose 50% Injectable 25 Gram(s) IV Push once  dextrose 50% Injectable 25 Gram(s) IV Push once  enoxaparin Injectable 40 milliGRAM(s) SubCutaneous daily  influenza   Vaccine 0.5 milliLiter(s) IntraMuscular once  insulin lispro (HumaLOG) corrective regimen sliding scale   SubCutaneous every 6 hours  metoprolol tartrate 25 milliGRAM(s) Oral two times a day  pantoprazole  Injectable 40 milliGRAM(s) IV Push every 12 hours    MEDICATIONS  (PRN):  dextrose 40% Gel 15 Gram(s) Oral once PRN Blood Glucose LESS THAN 70 milliGRAM(s)/deciliter  glucagon  Injectable 1 milliGRAM(s) IntraMuscular once PRN Glucose LESS THAN 70 milligrams/deciliter  HYDROmorphone  Injectable 0.5 milliGRAM(s) IV Push every 4 hours PRN Breakthrough  ondansetron Injectable 4 milliGRAM(s) IV Push every 6 hours PRN Nausea and/or Vomiting  oxyCODONE    IR 5 milliGRAM(s) Oral every 6 hours PRN Moderate Pain (4 - 6)  oxyCODONE    IR 10 milliGRAM(s) Oral every 6 hours PRN Severe Pain (7 - 10)      Vital Signs Last 24 Hrs  T(C): 37.6 (14 Apr 2019 00:29), Max: 37.6 (14 Apr 2019 00:29)  T(F): 99.7 (14 Apr 2019 00:29), Max: 99.7 (14 Apr 2019 00:29)  HR: 66 (14 Apr 2019 00:29) (66 - 84)  BP: 151/81 (14 Apr 2019 00:29) (149/80 - 167/84)  BP(mean): 103 (13 Apr 2019 09:00) (103 - 105)  RR: 18 (14 Apr 2019 00:29) (13 - 18)  SpO2: 95% (14 Apr 2019 00:29) (93% - 99%)    PE  Gen: Not in acute distress  Pulm: non-labored breathing  CV: RRR  Abd: Soft, nondistended, epigastric tenderness, guarding throughout abdominal exam  Ext: no pitting edema b/l  Vasc: 2+ radial and PT pulses  Neuro: AAOX3, no neurosensory deficits       I&O's Detail    12 Apr 2019 07:01  -  13 Apr 2019 07:00  --------------------------------------------------------  IN:    multiple electrolytes Injection Type 1multiple electrolytes Injection Type 1: 450 mL    Solution: 250 mL  Total IN: 700 mL    OUT:    Voided: 900 mL  Total OUT: 900 mL    Total NET: -200 mL          LABS:                        8.9    6.4   )-----------( 164      ( 13 Apr 2019 05:14 )             26.2     04-13    142  |  106  |  12.0  ----------------------------<  89  3.6   |  25.0  |  0.72    Ca    8.6      13 Apr 2019 05:14  Phos  2.6     04-13  Mg     1.8     04-13    TPro  5.7<L>  /  Alb  3.2<L>  /  TBili  0.6  /  DBili  0.3  /  AST  34<H>  /  ALT  91<H>  /  AlkPhos  134<H>  04-13          RADIOLOGY & ADDITIONAL STUDIES:

## 2019-04-14 NOTE — PROGRESS NOTE ADULT - REASON FOR ADMISSION
pancreatitis
pancreatitis  distant martine.  Mild LFT's.
pancreatitis

## 2019-04-14 NOTE — PROGRESS NOTE ADULT - ATTENDING COMMENTS
Patient seen and examined.   pain mostly resolved, tolerating diet  Restart metformin  Home tomorrow

## 2019-04-14 NOTE — PROGRESS NOTE ADULT - ASSESSMENT
ASSESSMENT/PLAN:  68yFemaleSepsis  Family history of CHF (congestive heart failure)  Handoff  MEWS Score  Pancreatitis  Herniated lumbar intervertebral disc  Endogenous hyperlipemia  HTN (hypertension)  Diabetes  Sepsis, due to unspecified organism  Status post cataract extraction  S/P tonsillectomy  S/P cholecystectomy  S/P appendectomy  ABDOMINAL PAIN/VOMITTING  90+  Drug-induced acute pancreatitis, unspecified complication status    Neuro: CAM neg, RASS 0, GCS 15, alert and oriented x3, pain well controlled   - continue pain control regimen   - avoid deliregenic meds     CV: sinus, and hemodynamically normal   - continue home meds (antihypertensives)     Pulm: RA    GI/Nutrition: soft, progressively advancing   - continue soft and allow for self advancement     /Renal: adequate UOP  - consider d/c garcia today   - continue adequate hydration     ID: none    Lines/Tubes: PIV, Garcia     Endo: ISS   - POCT FSG WNL overnight, will remain on ISS     Skin: none    Proph: SCD, Lovenox     Dispo: consider downgrade today     CRITICAL CARE TIME SPENT:
A/P 68 year old female with pancreatitis [place] 4 seen to be improving.    -f/u AM labs  -f/u with endo for diabetes med recommendations  -regular diet  -abdominal exams  -pain management
Acute interstitial pancreatitis.  No necrosis. Physiologic dilitation of the CBD with cholecystectomy.  Minor nonspecific LFT's. Downtrending.    Still awaiting MRI/ MRCP.  Has tolerated diet advancement.    Continue current care.  Mobilize.  OK for transfer out of ICU from GI [facility].
Resolving biliary pancreatitis.  Uncomplicated to date.  No CBD pathology on MRCP.  Therefore no ERCP.    Pancreas is normal on current MRI:  No neoplasia.    Minimal abnormal LFT's.  All gradually improving.    Pt has tolerated regular diet x 3 days.  No IVF.    Advised to minimize the narcs and mobilize.  OK for Discharge home from a GI POV on regular diet.  OK for GI Office follow up post discharge with  Dr Dennys Miller in 2 months.  Reconsult as needed.
Patient with pancreatitis and thickened gastric wall. Follow up LFTs. Follow up MRI results.   If continues to improve, EGD inpatient vs outpatient.   Will add IV PPI bid  Will follow

## 2019-04-15 ENCOUNTER — TRANSCRIPTION ENCOUNTER (OUTPATIENT)
Age: 69
End: 2019-04-15

## 2019-04-15 VITALS
TEMPERATURE: 98 F | HEART RATE: 64 BPM | DIASTOLIC BLOOD PRESSURE: 76 MMHG | SYSTOLIC BLOOD PRESSURE: 131 MMHG | OXYGEN SATURATION: 95 % | RESPIRATION RATE: 18 BRPM

## 2019-04-15 LAB
CULTURE RESULTS: SIGNIFICANT CHANGE UP
CULTURE RESULTS: SIGNIFICANT CHANGE UP
GLUCOSE BLDC GLUCOMTR-MCNC: 125 MG/DL — HIGH (ref 70–99)
GLUCOSE BLDC GLUCOMTR-MCNC: 133 MG/DL — HIGH (ref 70–99)
GLUCOSE BLDC GLUCOMTR-MCNC: 153 MG/DL — HIGH (ref 70–99)
GLUCOSE BLDC GLUCOMTR-MCNC: 295 MG/DL — HIGH (ref 70–99)
SPECIMEN SOURCE: SIGNIFICANT CHANGE UP
SPECIMEN SOURCE: SIGNIFICANT CHANGE UP

## 2019-04-15 PROCEDURE — 84478 ASSAY OF TRIGLYCERIDES: CPT

## 2019-04-15 PROCEDURE — 74177 CT ABD & PELVIS W/CONTRAST: CPT

## 2019-04-15 PROCEDURE — 83036 HEMOGLOBIN GLYCOSYLATED A1C: CPT

## 2019-04-15 PROCEDURE — 82310 ASSAY OF CALCIUM: CPT

## 2019-04-15 PROCEDURE — 80076 HEPATIC FUNCTION PANEL: CPT

## 2019-04-15 PROCEDURE — 86803 HEPATITIS C AB TEST: CPT

## 2019-04-15 PROCEDURE — 74183 MRI ABD W/O CNTR FLWD CNTR: CPT

## 2019-04-15 PROCEDURE — 99285 EMERGENCY DEPT VISIT HI MDM: CPT | Mod: 25

## 2019-04-15 PROCEDURE — 81001 URINALYSIS AUTO W/SCOPE: CPT

## 2019-04-15 PROCEDURE — 85027 COMPLETE CBC AUTOMATED: CPT

## 2019-04-15 PROCEDURE — 80053 COMPREHEN METABOLIC PANEL: CPT

## 2019-04-15 PROCEDURE — 80048 BASIC METABOLIC PNL TOTAL CA: CPT

## 2019-04-15 PROCEDURE — 87040 BLOOD CULTURE FOR BACTERIA: CPT

## 2019-04-15 PROCEDURE — 96365 THER/PROPH/DIAG IV INF INIT: CPT | Mod: XU

## 2019-04-15 PROCEDURE — 82465 ASSAY BLD/SERUM CHOLESTEROL: CPT

## 2019-04-15 PROCEDURE — 96375 TX/PRO/DX INJ NEW DRUG ADDON: CPT

## 2019-04-15 PROCEDURE — 83605 ASSAY OF LACTIC ACID: CPT

## 2019-04-15 PROCEDURE — 83735 ASSAY OF MAGNESIUM: CPT

## 2019-04-15 PROCEDURE — 82962 GLUCOSE BLOOD TEST: CPT

## 2019-04-15 PROCEDURE — 71045 X-RAY EXAM CHEST 1 VIEW: CPT

## 2019-04-15 PROCEDURE — 86900 BLOOD TYPING SEROLOGIC ABO: CPT

## 2019-04-15 PROCEDURE — 83970 ASSAY OF PARATHORMONE: CPT

## 2019-04-15 PROCEDURE — 36415 COLL VENOUS BLD VENIPUNCTURE: CPT

## 2019-04-15 PROCEDURE — 86850 RBC ANTIBODY SCREEN: CPT

## 2019-04-15 PROCEDURE — 84100 ASSAY OF PHOSPHORUS: CPT

## 2019-04-15 PROCEDURE — 83615 LACTATE (LD) (LDH) ENZYME: CPT

## 2019-04-15 PROCEDURE — 86901 BLOOD TYPING SEROLOGIC RH(D): CPT

## 2019-04-15 PROCEDURE — 84681 ASSAY OF C-PEPTIDE: CPT

## 2019-04-15 PROCEDURE — 93005 ELECTROCARDIOGRAM TRACING: CPT

## 2019-04-15 PROCEDURE — 83690 ASSAY OF LIPASE: CPT

## 2019-04-15 RX ORDER — METFORMIN HYDROCHLORIDE 850 MG/1
2 TABLET ORAL
Qty: 120 | Refills: 0
Start: 2019-04-15 | End: 2019-05-14

## 2019-04-15 RX ORDER — PANTOPRAZOLE SODIUM 20 MG/1
1 TABLET, DELAYED RELEASE ORAL
Qty: 60 | Refills: 0
Start: 2019-04-15 | End: 2019-05-14

## 2019-04-15 RX ORDER — METFORMIN HYDROCHLORIDE 850 MG/1
1 TABLET ORAL
Qty: 0 | Refills: 0 | DISCHARGE
Start: 2019-04-15 | End: 2019-05-14

## 2019-04-15 RX ADMIN — Medication 3: at 12:24

## 2019-04-15 RX ADMIN — OXYCODONE HYDROCHLORIDE 5 MILLIGRAM(S): 5 TABLET ORAL at 04:00

## 2019-04-15 RX ADMIN — Medication 25 MILLIGRAM(S): at 05:46

## 2019-04-15 RX ADMIN — OXYCODONE HYDROCHLORIDE 10 MILLIGRAM(S): 5 TABLET ORAL at 05:56

## 2019-04-15 RX ADMIN — ENOXAPARIN SODIUM 40 MILLIGRAM(S): 100 INJECTION SUBCUTANEOUS at 12:23

## 2019-04-15 RX ADMIN — OXYCODONE HYDROCHLORIDE 10 MILLIGRAM(S): 5 TABLET ORAL at 12:03

## 2019-04-15 RX ADMIN — OXYCODONE HYDROCHLORIDE 5 MILLIGRAM(S): 5 TABLET ORAL at 03:38

## 2019-04-15 RX ADMIN — METFORMIN HYDROCHLORIDE 1000 MILLIGRAM(S): 850 TABLET ORAL at 05:46

## 2019-04-15 RX ADMIN — Medication 1: at 00:59

## 2019-04-15 RX ADMIN — PANTOPRAZOLE SODIUM 40 MILLIGRAM(S): 20 TABLET, DELAYED RELEASE ORAL at 05:46

## 2019-04-15 RX ADMIN — OXYCODONE HYDROCHLORIDE 10 MILLIGRAM(S): 5 TABLET ORAL at 07:12

## 2019-04-15 NOTE — DISCHARGE NOTE PROVIDER - NSDCCPCAREPLAN_GEN_ALL_CORE_FT
PRINCIPAL DISCHARGE DIAGNOSIS  Diagnosis: Drug-induced acute pancreatitis, unspecified complication status  Assessment and Plan of Treatment:       SECONDARY DISCHARGE DIAGNOSES  Diagnosis: Sepsis, due to unspecified organism  Assessment and Plan of Treatment:     Diagnosis: Drug-induced acute pancreatitis, unspecified complication status  Assessment and Plan of Treatment:

## 2019-04-15 NOTE — DISCHARGE NOTE PROVIDER - CARE PROVIDER_API CALL
Dennys Miller)  Gastroenterology; Internal Medicine  [address] [place] [address]  Phone: [phone]  Fax: [phone]  Follow Up Time:

## 2019-04-15 NOTE — DISCHARGE NOTE NURSING/CASE MANAGEMENT/SOCIAL WORK - NSDCDPATPORTLINK_GEN_ALL_CORE
You can access the [facility], offered by [facility], by registering with the following website: [email or web address]

## 2019-04-15 NOTE — DISCHARGE NOTE PROVIDER - HOSPITAL COURSE
HPI:    67 y/o female presents to ED complaining of approximately 1 day of worsening abdominal pain, nausea, and vomiting. Patient gave verbal consent to obtain majority of history from her  who is at bedside. Abdominal pain started yesterday morning, worsened throughout the day, mostly epigastric and sharp/stabbing in nature. Initially pain was intermittent but now it is constant. When patient vomits she states it helps to relieve the pain temporarily. Non-radiating. Associated with nausea & vomiting.  states patient has a lot of "stomach" issues so when she did not feel well yesterday morning they thought it was just her "stomach acting up again."  estimates that she vomited 20 times in the past 24 hours.  He states patient had medication induced pancreatitis approx. 4-5 years ago for which he thinks was caused by glipizide but he was not sure.         PMHx: pancreatitis, HTN, diabetes, RBBB, HLD, chronic back pain    PSHx: cardiac cath 2/7/2019 (negative, no intervention), cholecystectomy >10 yrs ago, appendectomy @ age 6, tonsillectomy cataract surgery    Allergies: PCN, unknown reaction    Medications: listed below    Social hx: denies any EtOH or tobacco use. Occasional opoid use for chronic back pain        Hospital Course:    Patient was admitted with pancreatitis. She was made NPO and hydrated. GI was consulted. CT A/P was performed and did not reveal etiology. MRCP as well. Thought was etiology of pancreatitis was based on glipizide medication. Was discontinued. Metformin was started. Endocrine consulted. Patient's pain and symptoms resolved. Diet was introduced and tolerated. Patient will follow up with ACS as outpatient. Patient will continue metformin on discharge and follow up with endocrinology.

## 2019-04-15 NOTE — DISCHARGE NOTE PROVIDER - NSDCFUADDINST_GEN_ALL_CORE_FT
Follow up with Endocrinology as outpatient. Patient has her own endocrinologist per conversation. Metformin to be continued as outpatient.   Please follow up with your primary care physician regarding your hospitalization   Follow up with acute care surgery in clinic x 2 weeks.   Return to ED if unable to tolerate diet, increasing and worsening abdominal pain or refractory nausea and/or vomiting.   Tylenol for pain.

## 2019-04-15 NOTE — DISCHARGE NOTE PROVIDER - NSFOLLOWUPCLINICS_GEN_ALL_ED_FT
[facility]  Acute Care Surgery  [address] [place] [id number]  Phone: [phone]  Fax:   Follow Up Time:

## 2019-05-29 ENCOUNTER — APPOINTMENT (OUTPATIENT)
Dept: ENDOCRINOLOGY | Facility: CLINIC | Age: 69
End: 2019-05-29
Payer: MEDICARE

## 2019-05-29 VITALS
SYSTOLIC BLOOD PRESSURE: 138 MMHG | HEART RATE: 75 BPM | WEIGHT: 125 LBS | HEIGHT: 61 IN | DIASTOLIC BLOOD PRESSURE: 70 MMHG | BODY MASS INDEX: 23.6 KG/M2

## 2019-05-29 LAB — GLUCOSE BLDC GLUCOMTR-MCNC: 166

## 2019-05-29 PROCEDURE — 82962 GLUCOSE BLOOD TEST: CPT

## 2019-05-29 PROCEDURE — 99215 OFFICE O/P EST HI 40 MIN: CPT | Mod: 25

## 2019-05-29 RX ORDER — OXYCODONE 5 MG/1
5 TABLET ORAL
Refills: 0 | Status: ACTIVE | COMMUNITY

## 2019-05-29 RX ORDER — RAMIPRIL 5 MG/1
5 CAPSULE ORAL DAILY
Refills: 0 | Status: ACTIVE | COMMUNITY

## 2019-05-29 RX ORDER — METFORMIN ER 500 MG 500 MG/1
500 TABLET ORAL DAILY
Qty: 360 | Refills: 1 | Status: ACTIVE | COMMUNITY
Start: 2019-05-29 | End: 1900-01-01

## 2019-05-29 RX ORDER — FLASH GLUCOSE SCANNING READER
EACH MISCELLANEOUS
Qty: 1 | Refills: 0 | Status: ACTIVE | COMMUNITY
Start: 2019-05-29 | End: 1900-01-01

## 2019-05-29 RX ORDER — FLASH GLUCOSE SENSOR
KIT MISCELLANEOUS
Qty: 2 | Refills: 3 | Status: ACTIVE | COMMUNITY
Start: 2019-05-29 | End: 1900-01-01

## 2019-05-29 RX ORDER — CYCLOSPORINE 0.5 MG/ML
0.05 EMULSION OPHTHALMIC
Refills: 0 | Status: ACTIVE | COMMUNITY

## 2019-05-29 RX ORDER — ALPRAZOLAM 0.25 MG/1
0.25 TABLET ORAL
Refills: 0 | Status: ACTIVE | COMMUNITY

## 2019-07-29 ENCOUNTER — OTHER (OUTPATIENT)
Age: 69
End: 2019-07-29

## 2019-08-22 ENCOUNTER — RX CHANGE (OUTPATIENT)
Age: 69
End: 2019-08-22

## 2019-08-22 ENCOUNTER — APPOINTMENT (OUTPATIENT)
Dept: ENDOCRINOLOGY | Facility: CLINIC | Age: 69
End: 2019-08-22
Payer: MEDICARE

## 2019-08-22 VITALS
DIASTOLIC BLOOD PRESSURE: 80 MMHG | SYSTOLIC BLOOD PRESSURE: 150 MMHG | HEIGHT: 61 IN | WEIGHT: 123 LBS | HEART RATE: 73 BPM | BODY MASS INDEX: 23.22 KG/M2

## 2019-08-22 LAB — GLUCOSE BLDC GLUCOMTR-MCNC: 181

## 2019-08-22 PROCEDURE — 82962 GLUCOSE BLOOD TEST: CPT

## 2019-08-22 PROCEDURE — 99215 OFFICE O/P EST HI 40 MIN: CPT | Mod: 25

## 2019-08-22 RX ORDER — BLOOD-GLUCOSE METER
W/DEVICE KIT MISCELLANEOUS
Qty: 1 | Refills: 0 | Status: ACTIVE | COMMUNITY
Start: 2019-08-22 | End: 1900-01-01

## 2019-09-25 ENCOUNTER — APPOINTMENT (OUTPATIENT)
Dept: ENDOCRINOLOGY | Facility: CLINIC | Age: 69
End: 2019-09-25
Payer: MEDICARE

## 2019-09-25 LAB — HBA1C MFR BLD HPLC: 7.6

## 2019-09-25 PROCEDURE — 97803 MED NUTRITION INDIV SUBSEQ: CPT

## 2019-11-13 NOTE — PATIENT PROFILE ADULT - PRIMARY ROLES/RESPONSIBILITIES
Kaylynn Brock Patient Age: 85 year old  MESSAGE:   To Triage RN  1. Before I transfer you to a nurse, can you briefly tell me what symptoms you are experiencing?Yoanna on legal stating patient started today with blood in her urine and frequency    2. Which physician would you like us to send a message to? Dr. CACERES         WEIGHT AND HEIGHT:   Wt Readings from Last 1 Encounters:   09/06/19 122.5 kg (270 lb)     Ht Readings from Last 1 Encounters:   09/06/19 4' 11\" (1.499 m)     BMI Readings from Last 1 Encounters:   09/06/19 54.53 kg/m²       ALLERGIES:  Alprazolam; Morphine; and Xanax  Current Outpatient Medications   Medication   • metoPROLOL tartrate (LOPRESSOR) 25 MG tablet   • HYDROcodone-acetaminophen (NORCO) 7.5-325 MG per tablet   • ELIQUIS 5 MG Tab   • furosemide (LASIX) 40 MG tablet   • atorvastatin (LIPITOR) 20 MG tablet   • calcitRIOL (ROCALTROL) 0.25 MCG capsule   • lisinopril (ZESTRIL) 5 MG tablet   • calcium carbonate (TUMS) 500 MG chewable tablet   • VITAMIN D, ERGOCALCIFEROL, PO   • latanoprost (XALATAN) 0.005 % ophthalmic solution   • Multiple Vitamins-Minerals (CENTRUM SILVER 50+WOMEN) Tab     No current facility-administered medications for this visit.      PHARMACY to use:         [facility] [address] [place] [address]  Pharmacy preference(s) on file:   [email or web address] - [place], [place] - [address] AT [place] [address]  [address]  [facility] [address]  Phone: [phone] Fax: [phone]    [id number] - [place], [place] - [address] AT [facility]  ([address]  [address] [facility] [address]  Phone: [phone] Fax: [phone]    [id number] - [place], [place] - [address] AT [facility]  [address]  [address]  [facility] [address]  Phone: [phone] Fax: [phone]      CALL BACK INFO: Ok to leave response (including medical information) with family member or on answering machine  ROUTING:  Patient's physician/staff        PCP: Magen Izquierdo MD         INS: Payor: MEDICARE / Plan: PARTA AND B / Product Type: MEDICARE   PATIENT ADDRESS:  [address] [place] [address]     caregiver for other(s)

## 2019-11-14 ENCOUNTER — APPOINTMENT (OUTPATIENT)
Dept: ENDOCRINOLOGY | Facility: CLINIC | Age: 69
End: 2019-11-14
Payer: MEDICARE

## 2019-11-14 VITALS
WEIGHT: 113 LBS | HEIGHT: 61 IN | DIASTOLIC BLOOD PRESSURE: 72 MMHG | SYSTOLIC BLOOD PRESSURE: 132 MMHG | BODY MASS INDEX: 21.34 KG/M2 | HEART RATE: 74 BPM | OXYGEN SATURATION: 97 %

## 2019-11-14 DIAGNOSIS — E11.319 TYPE 2 DIABETES MELLITUS WITH UNSPECIFIED DIABETIC RETINOPATHY W/OUT MACULAR EDEMA: ICD-10-CM

## 2019-11-14 DIAGNOSIS — E78.5 HYPERLIPIDEMIA, UNSPECIFIED: ICD-10-CM

## 2019-11-14 DIAGNOSIS — I10 ESSENTIAL (PRIMARY) HYPERTENSION: ICD-10-CM

## 2019-11-14 LAB — GLUCOSE BLDC GLUCOMTR-MCNC: 263

## 2019-11-14 PROCEDURE — 82962 GLUCOSE BLOOD TEST: CPT

## 2019-11-14 PROCEDURE — 99214 OFFICE O/P EST MOD 30 MIN: CPT | Mod: 25

## 2019-11-14 RX ORDER — LANCETS 33 GAUGE
EACH MISCELLANEOUS
Qty: 7 | Refills: 0 | Status: ACTIVE | COMMUNITY
Start: 2019-08-22 | End: 1900-01-01

## 2019-11-14 NOTE — HISTORY OF PRESENT ILLNESS
[FreeTextEntry1] : interval history:\par Pt. started B12 injections for 10 days then will start weekly injections this week \par seen in the past for medication induced-pancreatitis and diabetes\par had 2 episodes of pancreatitis \par pancreatitis 1st episode due to onglyza possibly\par pancreatitis 2nd episode due to glyburide\par diarrhea stopped after B12 injections started \par \par \par Current Medication Regimen:\par Metformin 2000 mg- takes with food, no issues\par \par FS in office: 263\par FS checks daily fasting and 2-3 hours after meals \par ranges per logs 103- 205 - see attached \par blood sugar improving over the last week \par \par diet: trying to find a different foods to avoid high blood sugar \par exercise: walking\par \par eye doctor: 11/2019, has left DR, [place] [facility]\par

## 2019-11-14 NOTE — REASON FOR VISIT
[Follow-Up: _____] : a [unfilled] follow-up visit [Spouse] : spouse [FreeTextEntry1] : Type 2 DM with hx of pancreatitis, HLD, and  HTN

## 2019-11-14 NOTE — REVIEW OF SYSTEMS
[Anxiety] : anxiety [Cold Intolerance] : cold intolerant [Fatigue] : no fatigue [Recent Weight Gain (___ Lbs)] : no recent weight gain [Decreased Appetite] : appetite not decreased [Visual Field Defect] : no visual field defect [Recent Weight Loss (___ Lbs)] : no recent weight loss [Blurry Vision] : no blurred vision [Dysphagia] : no dysphagia [Dysphonia] : no dysphonia [Chest Pain] : no chest pain [Palpitations] : no palpitations [Neck Pain] : no neck pain [Constipation] : no constipation [Diarrhea] : no diarrhea [Polyuria] : no polyuria [Dysuria] : no dysuria [Headache] : no headaches [Tremors] : no tremors [Depression] : no depression [Polydipsia] : no polydipsia [Heat Intolerance] : heat tolerant [Easy Bruising] : no tendency for easy bruising [Swelling] : no swelling [FreeTextEntry2] : weight stable

## 2019-11-14 NOTE — PHYSICAL EXAM
[Alert] : alert [No Acute Distress] : no acute distress [Well Nourished] : well nourished [Well Developed] : well developed [Normal Sclera/Conjunctiva] : normal sclera/conjunctiva [EOMI] : extra ocular movement intact [Supple] : the neck was supple [No LAD] : no lymphadenopathy [Thyroid Not Enlarged] : the thyroid was not enlarged [No Thyroid Nodules] : there were no palpable thyroid nodules [Normal Rate and Effort] : normal respiratory rhythm and effort [No Accessory Muscle Use] : no accessory muscle use [Clear to Auscultation] : lungs were clear to auscultation bilaterally [Normal Rate] : heart rate was normal  [Regular Rhythm] : with a regular rhythm [Normal S1, S2] : normal S1 and S2 [Pedal Pulses Normal] : the pedal pulses are present [Normal Bowel Sounds] : normal bowel sounds [No Edema] : there was no peripheral edema [Not Tender] : non-tender [Soft] : abdomen soft [Normal Gait] : normal gait [No Rash] : no rash [Foot Ulcers] : no foot ulcers [Acanthosis Nigricans] : no acanthosis nigricans [Right Foot Was Examined] : right foot ~C was examined [Left Foot Was Examined] : left foot ~C was examined [Normal] : normal [Full ROM] : with full range of motion [Diminished Throughout Both Feet] : normal tactile sensation with monofilament testing throughout both feet [No Motor Deficits] : the motor exam was normal [No Tremors] : no tremors [Oriented x3] : oriented to person, place, and time [Normal Insight/Judgement] : insight and judgment were intact [Normal Mood] : the mood was normal

## 2019-11-14 NOTE — ASSESSMENT
[FreeTextEntry1] : 68 y/o female with Type 2 DM, HLD, and HTN. Labs reviewed from 8/17/19 - A1C 7.2, Cholesterol 141and LDL 56\par \par Plan: \par Type 2 DM: blood sugar improving\par - check A1C now\par - continue Metformin 2000 mg daily\par - may need prandial insulin in the future \par - continue self blood sugar monitoring\par - send in logs in 2 weeks\par - educated on healthy food choices\par - encouraged to continue routine exercise\par \par HLD: controlled with statin \par \par HTN: stable, continue ACE-I\par \par Labs and follow up visit in 3 months. \par  [Importance of Diet and Exercise] : importance of diet and exercise to improve glycemic control, achieve weight loss and improve cardiovascular health

## 2019-11-15 ENCOUNTER — APPOINTMENT (OUTPATIENT)
Dept: RADIOLOGY | Facility: CLINIC | Age: 69
End: 2019-11-15
Payer: MEDICARE

## 2019-11-15 ENCOUNTER — OUTPATIENT (OUTPATIENT)
Dept: OUTPATIENT SERVICES | Facility: HOSPITAL | Age: 69
LOS: 1 days | End: 2019-11-15
Payer: MEDICARE

## 2019-11-15 DIAGNOSIS — Z98.89 OTHER SPECIFIED POSTPROCEDURAL STATES: Chronic | ICD-10-CM

## 2019-11-15 DIAGNOSIS — Z98.49 CATARACT EXTRACTION STATUS, UNSPECIFIED EYE: Chronic | ICD-10-CM

## 2019-11-15 DIAGNOSIS — Z00.00 ENCOUNTER FOR GENERAL ADULT MEDICAL EXAMINATION WITHOUT ABNORMAL FINDINGS: ICD-10-CM

## 2019-11-15 DIAGNOSIS — Z90.49 ACQUIRED ABSENCE OF OTHER SPECIFIED PARTS OF DIGESTIVE TRACT: Chronic | ICD-10-CM

## 2019-11-15 PROCEDURE — 77074 RADEX OSSEOUS SURVEY LMTD: CPT

## 2019-11-15 PROCEDURE — 77074 RADEX OSSEOUS SURVEY LMTD: CPT | Mod: 26

## 2019-11-19 ENCOUNTER — RESULT REVIEW (OUTPATIENT)
Age: 69
End: 2019-11-19

## 2020-02-12 ENCOUNTER — APPOINTMENT (OUTPATIENT)
Dept: ENDOCRINOLOGY | Facility: CLINIC | Age: 70
End: 2020-02-12

## 2022-01-16 ENCOUNTER — INPATIENT (INPATIENT)
Facility: HOSPITAL | Age: 72
LOS: 6 days | Discharge: HOME | DRG: 871 | End: 2022-01-23
Attending: GENERAL ACUTE CARE HOSPITAL | Admitting: STUDENT IN AN ORGANIZED HEALTH CARE EDUCATION/TRAINING PROGRAM
Payer: MEDICARE

## 2022-01-16 VITALS
HEART RATE: 109 BPM | OXYGEN SATURATION: 99 % | HEIGHT: 61 IN | DIASTOLIC BLOOD PRESSURE: 56 MMHG | TEMPERATURE: 103 F | RESPIRATION RATE: 18 BRPM | SYSTOLIC BLOOD PRESSURE: 141 MMHG

## 2022-01-16 DIAGNOSIS — Z98.89 OTHER SPECIFIED POSTPROCEDURAL STATES: Chronic | ICD-10-CM

## 2022-01-16 DIAGNOSIS — K85.90 ACUTE PANCREATITIS WITHOUT NECROSIS OR INFECTION, UNSPECIFIED: ICD-10-CM

## 2022-01-16 DIAGNOSIS — Z90.49 ACQUIRED ABSENCE OF OTHER SPECIFIED PARTS OF DIGESTIVE TRACT: Chronic | ICD-10-CM

## 2022-01-16 DIAGNOSIS — Z98.49 CATARACT EXTRACTION STATUS, UNSPECIFIED EYE: Chronic | ICD-10-CM

## 2022-01-16 LAB
ALBUMIN SERPL ELPH-MCNC: 4.2 G/DL — SIGNIFICANT CHANGE UP (ref 3.3–5.2)
ALP SERPL-CCNC: 201 U/L — HIGH (ref 40–120)
ALT FLD-CCNC: 443 U/L — HIGH
ANION GAP SERPL CALC-SCNC: 15 MMOL/L — SIGNIFICANT CHANGE UP (ref 5–17)
APPEARANCE UR: CLEAR — SIGNIFICANT CHANGE UP
APTT BLD: 26.9 SEC — LOW (ref 27.5–35.5)
AST SERPL-CCNC: 653 U/L — HIGH
BACTERIA # UR AUTO: ABNORMAL
BASE EXCESS BLDV CALC-SCNC: 7.9 MMOL/L — HIGH (ref -2–3)
BASOPHILS # BLD AUTO: 0.01 K/UL — SIGNIFICANT CHANGE UP (ref 0–0.2)
BASOPHILS NFR BLD AUTO: 0.1 % — SIGNIFICANT CHANGE UP (ref 0–2)
BILIRUB SERPL-MCNC: 1.3 MG/DL — SIGNIFICANT CHANGE UP (ref 0.4–2)
BILIRUB UR-MCNC: NEGATIVE — SIGNIFICANT CHANGE UP
BUN SERPL-MCNC: 35.3 MG/DL — HIGH (ref 8–20)
CA-I SERPL-SCNC: 1.19 MMOL/L — SIGNIFICANT CHANGE UP (ref 1.15–1.33)
CALCIUM SERPL-MCNC: 9.9 MG/DL — SIGNIFICANT CHANGE UP (ref 8.6–10.2)
CHLORIDE BLDV-SCNC: 99 MMOL/L — SIGNIFICANT CHANGE UP (ref 98–107)
CHLORIDE SERPL-SCNC: 96 MMOL/L — LOW (ref 98–107)
CO2 SERPL-SCNC: 26 MMOL/L — SIGNIFICANT CHANGE UP (ref 22–29)
COLOR SPEC: YELLOW — SIGNIFICANT CHANGE UP
CREAT SERPL-MCNC: 1.06 MG/DL — SIGNIFICANT CHANGE UP (ref 0.5–1.3)
DIFF PNL FLD: ABNORMAL
EOSINOPHIL # BLD AUTO: 0.01 K/UL — SIGNIFICANT CHANGE UP (ref 0–0.5)
EOSINOPHIL NFR BLD AUTO: 0.1 % — SIGNIFICANT CHANGE UP (ref 0–6)
EPI CELLS # UR: SIGNIFICANT CHANGE UP
GAS PNL BLDV: 136 MMOL/L — SIGNIFICANT CHANGE UP (ref 136–145)
GAS PNL BLDV: SIGNIFICANT CHANGE UP
GLUCOSE BLDV-MCNC: 325 MG/DL — HIGH (ref 70–99)
GLUCOSE SERPL-MCNC: 312 MG/DL — HIGH (ref 70–99)
GLUCOSE UR QL: 250 MG/DL
HCO3 BLDV-SCNC: 31 MMOL/L — HIGH (ref 22–29)
HCT VFR BLD CALC: 33.2 % — LOW (ref 34.5–45)
HCT VFR BLDA CALC: 36 % — SIGNIFICANT CHANGE UP (ref 34–46)
HGB BLD CALC-MCNC: 12.1 G/DL — SIGNIFICANT CHANGE UP (ref 11.7–16.1)
HGB BLD-MCNC: 11.5 G/DL — SIGNIFICANT CHANGE UP (ref 11.5–15.5)
IMM GRANULOCYTES NFR BLD AUTO: 0.3 % — SIGNIFICANT CHANGE UP (ref 0–1.5)
INR BLD: 0.99 RATIO — SIGNIFICANT CHANGE UP (ref 0.88–1.16)
KETONES UR-MCNC: NEGATIVE — SIGNIFICANT CHANGE UP
LACTATE BLDV-MCNC: 1.6 MMOL/L — SIGNIFICANT CHANGE UP (ref 0.5–2)
LEUKOCYTE ESTERASE UR-ACNC: NEGATIVE — SIGNIFICANT CHANGE UP
LYMPHOCYTES # BLD AUTO: 0.52 K/UL — LOW (ref 1–3.3)
LYMPHOCYTES # BLD AUTO: 4.3 % — LOW (ref 13–44)
MCHC RBC-ENTMCNC: 30.4 PG — SIGNIFICANT CHANGE UP (ref 27–34)
MCHC RBC-ENTMCNC: 34.6 GM/DL — SIGNIFICANT CHANGE UP (ref 32–36)
MCV RBC AUTO: 87.8 FL — SIGNIFICANT CHANGE UP (ref 80–100)
MONOCYTES # BLD AUTO: 0.37 K/UL — SIGNIFICANT CHANGE UP (ref 0–0.9)
MONOCYTES NFR BLD AUTO: 3.1 % — SIGNIFICANT CHANGE UP (ref 2–14)
NEUTROPHILS # BLD AUTO: 11.02 K/UL — HIGH (ref 1.8–7.4)
NEUTROPHILS NFR BLD AUTO: 92.1 % — HIGH (ref 43–77)
NITRITE UR-MCNC: NEGATIVE — SIGNIFICANT CHANGE UP
PCO2 BLDV: 41 MMHG — SIGNIFICANT CHANGE UP (ref 39–42)
PH BLDV: 7.49 — HIGH (ref 7.32–7.43)
PH UR: 8 — SIGNIFICANT CHANGE UP (ref 5–8)
PLATELET # BLD AUTO: 201 K/UL — SIGNIFICANT CHANGE UP (ref 150–400)
PO2 BLDV: 172 MMHG — HIGH (ref 25–45)
POTASSIUM BLDV-SCNC: 4.3 MMOL/L — SIGNIFICANT CHANGE UP (ref 3.5–5.1)
POTASSIUM SERPL-MCNC: 4.4 MMOL/L — SIGNIFICANT CHANGE UP (ref 3.5–5.3)
POTASSIUM SERPL-SCNC: 4.4 MMOL/L — SIGNIFICANT CHANGE UP (ref 3.5–5.3)
PROT SERPL-MCNC: 7.3 G/DL — SIGNIFICANT CHANGE UP (ref 6.6–8.7)
PROT UR-MCNC: 15
PROTHROM AB SERPL-ACNC: 11.5 SEC — SIGNIFICANT CHANGE UP (ref 10.6–13.6)
RAPID RVP RESULT: SIGNIFICANT CHANGE UP
RBC # BLD: 3.78 M/UL — LOW (ref 3.8–5.2)
RBC # FLD: 11.8 % — SIGNIFICANT CHANGE UP (ref 10.3–14.5)
RBC CASTS # UR COMP ASSIST: ABNORMAL /HPF (ref 0–4)
SAO2 % BLDV: 99.1 % — SIGNIFICANT CHANGE UP
SARS-COV-2 RNA SPEC QL NAA+PROBE: SIGNIFICANT CHANGE UP
SODIUM SERPL-SCNC: 137 MMOL/L — SIGNIFICANT CHANGE UP (ref 135–145)
SP GR SPEC: 1.01 — SIGNIFICANT CHANGE UP (ref 1.01–1.02)
TROPONIN T SERPL-MCNC: <0.01 NG/ML — SIGNIFICANT CHANGE UP (ref 0–0.06)
UROBILINOGEN FLD QL: NEGATIVE MG/DL — SIGNIFICANT CHANGE UP
WBC # BLD: 11.97 K/UL — HIGH (ref 3.8–10.5)
WBC # FLD AUTO: 11.97 K/UL — HIGH (ref 3.8–10.5)
WBC UR QL: SIGNIFICANT CHANGE UP

## 2022-01-16 PROCEDURE — 74177 CT ABD & PELVIS W/CONTRAST: CPT | Mod: 26,MA

## 2022-01-16 PROCEDURE — 93010 ELECTROCARDIOGRAM REPORT: CPT

## 2022-01-16 PROCEDURE — 70450 CT HEAD/BRAIN W/O DYE: CPT | Mod: 26,MA

## 2022-01-16 PROCEDURE — 71045 X-RAY EXAM CHEST 1 VIEW: CPT | Mod: 26

## 2022-01-16 PROCEDURE — 99291 CRITICAL CARE FIRST HOUR: CPT

## 2022-01-16 RX ORDER — OXYCODONE HYDROCHLORIDE 5 MG/1
5 TABLET ORAL ONCE
Refills: 0 | Status: DISCONTINUED | OUTPATIENT
Start: 2022-01-16 | End: 2022-01-16

## 2022-01-16 RX ORDER — ASPIRIN/CALCIUM CARB/MAGNESIUM 324 MG
1 TABLET ORAL
Qty: 0 | Refills: 0 | DISCHARGE

## 2022-01-16 RX ORDER — SODIUM CHLORIDE 9 MG/ML
1000 INJECTION INTRAMUSCULAR; INTRAVENOUS; SUBCUTANEOUS ONCE
Refills: 0 | Status: COMPLETED | OUTPATIENT
Start: 2022-01-16 | End: 2022-01-16

## 2022-01-16 RX ORDER — IBUPROFEN 200 MG
600 TABLET ORAL ONCE
Refills: 0 | Status: COMPLETED | OUTPATIENT
Start: 2022-01-16 | End: 2022-01-16

## 2022-01-16 RX ORDER — AZTREONAM 2 G
2000 VIAL (EA) INJECTION ONCE
Refills: 0 | Status: COMPLETED | OUTPATIENT
Start: 2022-01-16 | End: 2022-01-16

## 2022-01-16 RX ORDER — OXYCODONE HYDROCHLORIDE 5 MG/1
1 TABLET ORAL
Qty: 0 | Refills: 0 | DISCHARGE

## 2022-01-16 RX ORDER — VANCOMYCIN HCL 1 G
1000 VIAL (EA) INTRAVENOUS ONCE
Refills: 0 | Status: COMPLETED | OUTPATIENT
Start: 2022-01-16 | End: 2022-01-16

## 2022-01-16 RX ORDER — ACETAMINOPHEN 500 MG
1000 TABLET ORAL ONCE
Refills: 0 | Status: COMPLETED | OUTPATIENT
Start: 2022-01-16 | End: 2022-01-16

## 2022-01-16 RX ADMIN — SODIUM CHLORIDE 1000 MILLILITER(S): 9 INJECTION INTRAMUSCULAR; INTRAVENOUS; SUBCUTANEOUS at 22:20

## 2022-01-16 RX ADMIN — SODIUM CHLORIDE 1000 MILLILITER(S): 9 INJECTION INTRAMUSCULAR; INTRAVENOUS; SUBCUTANEOUS at 21:25

## 2022-01-16 RX ADMIN — Medication 1000 MILLIGRAM(S): at 22:35

## 2022-01-16 RX ADMIN — Medication 100 MILLIGRAM(S): at 22:36

## 2022-01-16 RX ADMIN — Medication 1000 MILLIGRAM(S): at 20:45

## 2022-01-16 RX ADMIN — Medication 1000 MILLIGRAM(S): at 22:26

## 2022-01-16 RX ADMIN — Medication 250 MILLIGRAM(S): at 21:34

## 2022-01-16 RX ADMIN — SODIUM CHLORIDE 1000 MILLILITER(S): 9 INJECTION INTRAMUSCULAR; INTRAVENOUS; SUBCUTANEOUS at 20:25

## 2022-01-16 RX ADMIN — Medication 400 MILLIGRAM(S): at 20:30

## 2022-01-16 RX ADMIN — OXYCODONE HYDROCHLORIDE 5 MILLIGRAM(S): 5 TABLET ORAL at 22:36

## 2022-01-16 NOTE — ED PROVIDER NOTE - PROGRESS NOTE DETAILS
Resident Nathalia Nguyễn: spoke extensively to  in waiting room, discussed plan for labs, fluids, antibiotics, admission.  amenable to plan. Very knowledgeable about his wife's care and medical conditions, high medical knowledge.    , Niko: [phone] Resident Nathalia Nguyễn: reassessed pt, AAOx4 now, feeling better. Now able to give full and thorough history. Requesting home back pain medication oxycodone 5mg. Resident Nathalia Nguyễn: reassessed pt, AAOx4 now, feeling better. Now able to give full and thorough history. Requesting home back pain medication oxycodone 5mg. Spoke to , updated him about the plan.

## 2022-01-16 NOTE — ED PROVIDER NOTE - ATTENDING CONTRIBUTION TO CARE
Thao: I performed a face to face bedside interview with patient regarding history of present illness, review of symptoms and past medical history. I completed an independent physical exam.  I have discussed patient's plan of care with resident.   I agree with note as stated above including HISTORY OF PRESENT ILLNESS, HIV, PAST MEDICAL/SURGICAL/FAMILY/SOCIAL HISTORY, ALLERGIES AND HOME MEDICATIONS, REVIEW OF SYSTEMS, PHYSICAL EXAM, MEDICAL DECISION MAKING and any PROGRESS NOTES during the time I functioned as the attending physician for this patient unless otherwise noted. My brief assessment is as follows: 71F h/o HTN, HLD, DM, pancreatitis presents with fever, hypoxia and AMS. As per EMS patient with episode of emesis tonight and AMS. Typically AAOx3. Patient occasionally nodding but not answering any questions verbally on my exam. Vaccinated for covid.   Gen: ill appearing in mild distress  Head: NC/AT  Neck: trachea midline  Card: tachycardic, regular rhythm  Resp:  CTABm saturating 90% on RA, improves to 99%on 4LNC  Abd: soft, non-tender  Ext: no deformities  Neuro:  Appears non focal  Skin:  Warm and dry as visualized  Psych:  Normal affect and mood  Sepsis - undifferentiated  1) IV Access/IVF /LABS/Lactate (rpt after IVF if elevated)/UA/Cultures/RVP  2) CXR  3) IV Abx, antipyretics  4) Admit     Upon my evaluation, this patient had a high probability of imminent or life-threatening deterioration due to sepsis, hypoxia which required my direct attention, intervention, and personal management.  The patient has a  medical condition that impairs one or more vital organ systems.  Frequent personal assessment and adjustment of medical interventions was performed.      I have personally provided 45 minutes of critical care time exclusive of time spent on separately billable procedures. Time includes review of laboratory data, radiology results, discussion with consultants, patient and family; monitoring for potential decompensation, as well as time spent retrieving data and reviewing the chart and documenting the visit. Interventions were performed as documented above.

## 2022-01-16 NOTE — ED ADULT NURSE NOTE - NSIMPLEMENTINTERV_GEN_ALL_ED
Implemented All Fall Risk Interventions:  [place] to call system. Call bell, personal items and telephone within reach. Instruct patient to call for assistance. Room bathroom lighting operational. Non-slip footwear when patient is off stretcher. Physically safe environment: no spills, clutter or unnecessary equipment. Stretcher in lowest position, wheels locked, appropriate side rails in place. Provide visual cue, wrist band, yellow gown, etc. Monitor gait and stability. Monitor for mental status changes and reorient to person, place, and time. Review medications for side effects contributing to fall risk. Reinforce activity limits and safety measures with patient and family.

## 2022-01-16 NOTE — ED PROVIDER NOTE - OBJECTIVE STATEMENT
70yo F w/pmh DM, HTN, HLD, recurrent pancreatitis presents for n/v and AMS. Per , pt at baseline this morning, afebrile by forehead monitor, began c/o abdominal pain this afternoon, followed by several episodes of vomiting. Pt had pancreatitis 2x in past 6y, now on Kreon for pancreatic insufficiency, possible gastroparesis, chronically vomits at least 1x/week. Today vomiting much more than usual, became confused, so  called EMS. Denies alcohol or any h/o alcohol use, cigs, drugs. 70yo F w/pmh DM, HTN, HLD, recurrent pancreatitis presents for n/v and AMS. Per , pt at baseline this morning, afebrile by forehead check, began c/o abdominal pain this afternoon, followed by several episodes of vomiting. Pt had pancreatitis 2x in past 6y, now on Kreon for pancreatic insufficiency, possible gastroparesis, chronically vomits at least 1x/week. Today vomiting much more than usual, became confused, so  called EMS. Denies f/ch, CP, SOB at home. Denies sick contacts. Flu and COVID vaccinated, Pfizer with booster. Denies alcohol or any h/o alcohol use, cigs, drugs. Reports home FSG ~300. On metformin and Levomir. Last saw a cardiologist 3y ago, Dr. Mckeon,  reports w/u at that time was negative.

## 2022-01-16 NOTE — ED PROVIDER NOTE - PHYSICAL EXAMINATION
General: ill appearing, NAD  Head: NC/AT  Cardiac: RRR, no m/r/g  Respiratory: CTABL, equal chest wall expansions  Abdomen: soft, ND, NT  Neuro: Alert, not answering questions, intermittently following commands  Psych: calm, flattened affect  Skin: warm and dry

## 2022-01-16 NOTE — ED PROVIDER NOTE - NS ED ROS FT
Constitutional: +fever, no sweats, and no chills.  CV: no chest pain, no edema.  Resp: no cough, no dyspnea  GI: +abdominal pain, +nausea and +vomiting.  MSK: no msk pain, no weakness  Skin: no lesions, and no rashes.  Neuro: no LOC, no headache, +AMS  ROS otherwise negative except as noted in HPI.

## 2022-01-16 NOTE — ED ADULT NURSE NOTE - CHIEF COMPLAINT QUOTE
Brought to ED for AMS, LKW @1830. GCS13  Prior to AMS pt became nauseous and vomiting. Had one episode of hypoxia with EMS to an Spo2 to 70% now on NC, SPO2 in the 90s. FS high with EMS. PT brought to CC, Dr. Nguyễn at bedside.

## 2022-01-16 NOTE — ED ADULT NURSE REASSESSMENT NOTE - NS ED NURSE REASSESS COMMENT FT1
Additional liter of IVF ordered at 22:25, repeat lactate to be collected s/p 2nd liter of fluids.
When pt arrived in ED she was not answering questions, at this time pt responds to voice and is AAOx3. Pt unable to recall what happened today or provide information on how she currently feels. Pt remains connected to continuous cardiac, pulse ox and NIBP monitoring.
Pt awake and alert at this time. States that today she remembers feeling nausea and throwing up but not any other symptoms. Does not remember coming to the hospital. Is able to give medical history and allergies at this time, denies any complaints and states that she feels better. Monitoring maintained. Pending lab results and disposition.

## 2022-01-16 NOTE — ED ADULT NURSE NOTE - OBJECTIVE STATEMENT
BIBEMS for AMS and high BG. FS in . Pt hypoxic to 87% on RA, placed on 3L NC with improvement to 97%. MD at bedside for evaluation. Pt placed on continuous cardiac, pulse ox and NIBP monitoring.

## 2022-01-16 NOTE — ED PROVIDER NOTE - CLINICAL SUMMARY MEDICAL DECISION MAKING FREE TEXT BOX
72yo F w/pmh DM, HTN, HLD, recurrent pancreatitis presents for n/v and AMS. EKG shows old RBBB. Labs, CT head and abd/pelvis pending.

## 2022-01-17 LAB
A1C WITH ESTIMATED AVERAGE GLUCOSE RESULT: 6.8 % — HIGH (ref 4–5.6)
ALBUMIN SERPL ELPH-MCNC: 3.2 G/DL — LOW (ref 3.3–5.2)
ALP SERPL-CCNC: 173 U/L — HIGH (ref 40–120)
ALT FLD-CCNC: 476 U/L — HIGH
AMYLASE P1 CFR SERPL: 875 U/L — HIGH (ref 36–128)
ANION GAP SERPL CALC-SCNC: 15 MMOL/L — SIGNIFICANT CHANGE UP (ref 5–17)
APTT BLD: 23 SEC — LOW (ref 27.5–35.5)
AST SERPL-CCNC: 505 U/L — HIGH
BASOPHILS # BLD AUTO: 0 K/UL — SIGNIFICANT CHANGE UP (ref 0–0.2)
BASOPHILS NFR BLD AUTO: 0 % — SIGNIFICANT CHANGE UP (ref 0–2)
BILIRUB SERPL-MCNC: 1.6 MG/DL — SIGNIFICANT CHANGE UP (ref 0.4–2)
BUN SERPL-MCNC: 33.6 MG/DL — HIGH (ref 8–20)
CALCIUM SERPL-MCNC: 8.7 MG/DL — SIGNIFICANT CHANGE UP (ref 8.6–10.2)
CHLORIDE SERPL-SCNC: 102 MMOL/L — SIGNIFICANT CHANGE UP (ref 98–107)
CO2 SERPL-SCNC: 22 MMOL/L — SIGNIFICANT CHANGE UP (ref 22–29)
CREAT SERPL-MCNC: 1.08 MG/DL — SIGNIFICANT CHANGE UP (ref 0.5–1.3)
E COLI DNA BLD POS QL NAA+NON-PROBE: SIGNIFICANT CHANGE UP
EOSINOPHIL # BLD AUTO: 0 K/UL — SIGNIFICANT CHANGE UP (ref 0–0.5)
EOSINOPHIL NFR BLD AUTO: 0 % — SIGNIFICANT CHANGE UP (ref 0–6)
ESTIMATED AVERAGE GLUCOSE: 148 MG/DL — HIGH (ref 68–114)
GLUCOSE BLDC GLUCOMTR-MCNC: 101 MG/DL — HIGH (ref 70–99)
GLUCOSE BLDC GLUCOMTR-MCNC: 120 MG/DL — HIGH (ref 70–99)
GLUCOSE BLDC GLUCOMTR-MCNC: 125 MG/DL — HIGH (ref 70–99)
GLUCOSE BLDC GLUCOMTR-MCNC: 248 MG/DL — HIGH (ref 70–99)
GLUCOSE BLDC GLUCOMTR-MCNC: 272 MG/DL — HIGH (ref 70–99)
GLUCOSE SERPL-MCNC: 269 MG/DL — HIGH (ref 70–99)
GRAM STN FLD: SIGNIFICANT CHANGE UP
HCT VFR BLD CALC: 29.2 % — LOW (ref 34.5–45)
HGB BLD-MCNC: 9.6 G/DL — LOW (ref 11.5–15.5)
INR BLD: 1.03 RATIO — SIGNIFICANT CHANGE UP (ref 0.88–1.16)
LACTATE BLDV-MCNC: 1.2 MMOL/L — SIGNIFICANT CHANGE UP (ref 0.5–2)
LIDOCAIN IGE QN: 2053 U/L — HIGH (ref 22–51)
LYMPHOCYTES # BLD AUTO: 0.76 K/UL — LOW (ref 1–3.3)
LYMPHOCYTES # BLD AUTO: 3.5 % — LOW (ref 13–44)
MAGNESIUM SERPL-MCNC: 1.5 MG/DL — LOW (ref 1.6–2.6)
MANUAL SMEAR VERIFICATION: SIGNIFICANT CHANGE UP
MCHC RBC-ENTMCNC: 29.8 PG — SIGNIFICANT CHANGE UP (ref 27–34)
MCHC RBC-ENTMCNC: 32.9 GM/DL — SIGNIFICANT CHANGE UP (ref 32–36)
MCV RBC AUTO: 90.7 FL — SIGNIFICANT CHANGE UP (ref 80–100)
METHOD TYPE: SIGNIFICANT CHANGE UP
MONOCYTES # BLD AUTO: 0.57 K/UL — SIGNIFICANT CHANGE UP (ref 0–0.9)
MONOCYTES NFR BLD AUTO: 2.6 % — SIGNIFICANT CHANGE UP (ref 2–14)
NEUTROPHILS # BLD AUTO: 20.46 K/UL — HIGH (ref 1.8–7.4)
NEUTROPHILS NFR BLD AUTO: 92.2 % — HIGH (ref 43–77)
NEUTS BAND # BLD: 1.7 % — SIGNIFICANT CHANGE UP (ref 0–8)
ORGANISM # SPEC MICROSCOPIC CNT: SIGNIFICANT CHANGE UP
PHOSPHATE SERPL-MCNC: 2.3 MG/DL — LOW (ref 2.4–4.7)
PLAT MORPH BLD: NORMAL — SIGNIFICANT CHANGE UP
PLATELET # BLD AUTO: 188 K/UL — SIGNIFICANT CHANGE UP (ref 150–400)
POTASSIUM SERPL-MCNC: 4 MMOL/L — SIGNIFICANT CHANGE UP (ref 3.5–5.3)
POTASSIUM SERPL-SCNC: 4 MMOL/L — SIGNIFICANT CHANGE UP (ref 3.5–5.3)
PROT SERPL-MCNC: 6 G/DL — LOW (ref 6.6–8.7)
PROTHROM AB SERPL-ACNC: 11.9 SEC — SIGNIFICANT CHANGE UP (ref 10.6–13.6)
RBC # BLD: 3.22 M/UL — LOW (ref 3.8–5.2)
RBC # FLD: 11.9 % — SIGNIFICANT CHANGE UP (ref 10.3–14.5)
RBC BLD AUTO: NORMAL — SIGNIFICANT CHANGE UP
SODIUM SERPL-SCNC: 138 MMOL/L — SIGNIFICANT CHANGE UP (ref 135–145)
SPECIMEN SOURCE: SIGNIFICANT CHANGE UP
SPECIMEN SOURCE: SIGNIFICANT CHANGE UP
WBC # BLD: 21.79 K/UL — HIGH (ref 3.8–10.5)
WBC # FLD AUTO: 21.79 K/UL — HIGH (ref 3.8–10.5)

## 2022-01-17 PROCEDURE — 99223 1ST HOSP IP/OBS HIGH 75: CPT

## 2022-01-17 PROCEDURE — 99233 SBSQ HOSP IP/OBS HIGH 50: CPT

## 2022-01-17 RX ORDER — ASPIRIN/CALCIUM CARB/MAGNESIUM 324 MG
81 TABLET ORAL DAILY
Refills: 0 | Status: DISCONTINUED | OUTPATIENT
Start: 2022-01-17 | End: 2022-01-23

## 2022-01-17 RX ORDER — SODIUM CHLORIDE 9 MG/ML
1000 INJECTION, SOLUTION INTRAVENOUS
Refills: 0 | Status: DISCONTINUED | OUTPATIENT
Start: 2022-01-17 | End: 2022-01-21

## 2022-01-17 RX ORDER — AZTREONAM 2 G
2000 VIAL (EA) INJECTION EVERY 8 HOURS
Refills: 0 | Status: DISCONTINUED | OUTPATIENT
Start: 2022-01-17 | End: 2022-01-18

## 2022-01-17 RX ORDER — ENOXAPARIN SODIUM 100 MG/ML
40 INJECTION SUBCUTANEOUS DAILY
Refills: 0 | Status: DISCONTINUED | OUTPATIENT
Start: 2022-01-17 | End: 2022-01-17

## 2022-01-17 RX ORDER — ALPRAZOLAM 0.25 MG
0.25 TABLET ORAL AT BEDTIME
Refills: 0 | Status: DISCONTINUED | OUTPATIENT
Start: 2022-01-17 | End: 2022-01-23

## 2022-01-17 RX ORDER — LATANOPROST 0.05 MG/ML
1 SOLUTION/ DROPS OPHTHALMIC; TOPICAL AT BEDTIME
Refills: 0 | Status: DISCONTINUED | OUTPATIENT
Start: 2022-01-17 | End: 2022-01-23

## 2022-01-17 RX ORDER — MORPHINE SULFATE 50 MG/1
2 CAPSULE, EXTENDED RELEASE ORAL EVERY 4 HOURS
Refills: 0 | Status: DISCONTINUED | OUTPATIENT
Start: 2022-01-17 | End: 2022-01-23

## 2022-01-17 RX ORDER — MORPHINE SULFATE 50 MG/1
4 CAPSULE, EXTENDED RELEASE ORAL EVERY 4 HOURS
Refills: 0 | Status: DISCONTINUED | OUTPATIENT
Start: 2022-01-17 | End: 2022-01-23

## 2022-01-17 RX ORDER — SODIUM CHLORIDE 9 MG/ML
1000 INJECTION, SOLUTION INTRAVENOUS
Refills: 0 | Status: DISCONTINUED | OUTPATIENT
Start: 2022-01-17 | End: 2022-01-23

## 2022-01-17 RX ORDER — LANOLIN ALCOHOL/MO/W.PET/CERES
3 CREAM (GRAM) TOPICAL AT BEDTIME
Refills: 0 | Status: DISCONTINUED | OUTPATIENT
Start: 2022-01-17 | End: 2022-01-23

## 2022-01-17 RX ORDER — METOPROLOL TARTRATE 50 MG
75 TABLET ORAL DAILY
Refills: 0 | Status: DISCONTINUED | OUTPATIENT
Start: 2022-01-17 | End: 2022-01-23

## 2022-01-17 RX ORDER — ATORVASTATIN CALCIUM 80 MG/1
20 TABLET, FILM COATED ORAL AT BEDTIME
Refills: 0 | Status: DISCONTINUED | OUTPATIENT
Start: 2022-01-17 | End: 2022-01-23

## 2022-01-17 RX ORDER — MAGNESIUM SULFATE 500 MG/ML
2 VIAL (ML) INJECTION ONCE
Refills: 0 | Status: COMPLETED | OUTPATIENT
Start: 2022-01-17 | End: 2022-01-17

## 2022-01-17 RX ORDER — CEFTRIAXONE 500 MG/1
INJECTION, POWDER, FOR SOLUTION INTRAMUSCULAR; INTRAVENOUS
Refills: 0 | Status: DISCONTINUED | OUTPATIENT
Start: 2022-01-17 | End: 2022-01-17

## 2022-01-17 RX ORDER — GLUCAGON INJECTION, SOLUTION 0.5 MG/.1ML
1 INJECTION, SOLUTION SUBCUTANEOUS ONCE
Refills: 0 | Status: DISCONTINUED | OUTPATIENT
Start: 2022-01-17 | End: 2022-01-23

## 2022-01-17 RX ORDER — LIPASE/PROTEASE/AMYLASE 16-48-48K
0 CAPSULE,DELAYED RELEASE (ENTERIC COATED) ORAL
Qty: 0 | Refills: 0 | DISCHARGE

## 2022-01-17 RX ORDER — ACETAMINOPHEN 500 MG
650 TABLET ORAL EVERY 6 HOURS
Refills: 0 | Status: DISCONTINUED | OUTPATIENT
Start: 2022-01-17 | End: 2022-01-23

## 2022-01-17 RX ORDER — DEXTROSE 50 % IN WATER 50 %
15 SYRINGE (ML) INTRAVENOUS ONCE
Refills: 0 | Status: DISCONTINUED | OUTPATIENT
Start: 2022-01-17 | End: 2022-01-23

## 2022-01-17 RX ORDER — ONDANSETRON 8 MG/1
4 TABLET, FILM COATED ORAL EVERY 8 HOURS
Refills: 0 | Status: DISCONTINUED | OUTPATIENT
Start: 2022-01-17 | End: 2022-01-23

## 2022-01-17 RX ORDER — BRIMONIDINE TARTRATE 2 MG/MG
1 SOLUTION/ DROPS OPHTHALMIC
Refills: 0 | Status: DISCONTINUED | OUTPATIENT
Start: 2022-01-17 | End: 2022-01-23

## 2022-01-17 RX ORDER — INSULIN LISPRO 100/ML
VIAL (ML) SUBCUTANEOUS EVERY 6 HOURS
Refills: 0 | Status: DISCONTINUED | OUTPATIENT
Start: 2022-01-17 | End: 2022-01-23

## 2022-01-17 RX ORDER — INSULIN DETEMIR 100/ML (3)
10 INSULIN PEN (ML) SUBCUTANEOUS AT BEDTIME
Refills: 0 | Status: DISCONTINUED | OUTPATIENT
Start: 2022-01-17 | End: 2022-01-23

## 2022-01-17 RX ORDER — LISINOPRIL 2.5 MG/1
20 TABLET ORAL DAILY
Refills: 0 | Status: DISCONTINUED | OUTPATIENT
Start: 2022-01-17 | End: 2022-01-22

## 2022-01-17 RX ORDER — DEXTROSE 50 % IN WATER 50 %
25 SYRINGE (ML) INTRAVENOUS ONCE
Refills: 0 | Status: DISCONTINUED | OUTPATIENT
Start: 2022-01-17 | End: 2022-01-23

## 2022-01-17 RX ORDER — PANTOPRAZOLE SODIUM 20 MG/1
40 TABLET, DELAYED RELEASE ORAL
Refills: 0 | Status: DISCONTINUED | OUTPATIENT
Start: 2022-01-17 | End: 2022-01-23

## 2022-01-17 RX ORDER — INSULIN DETEMIR 100/ML (3)
10 INSULIN PEN (ML) SUBCUTANEOUS ONCE
Refills: 0 | Status: COMPLETED | OUTPATIENT
Start: 2022-01-17 | End: 2022-01-17

## 2022-01-17 RX ORDER — DEXTROSE 50 % IN WATER 50 %
12.5 SYRINGE (ML) INTRAVENOUS ONCE
Refills: 0 | Status: DISCONTINUED | OUTPATIENT
Start: 2022-01-17 | End: 2022-01-23

## 2022-01-17 RX ORDER — CEFTRIAXONE 500 MG/1
1000 INJECTION, POWDER, FOR SOLUTION INTRAMUSCULAR; INTRAVENOUS ONCE
Refills: 0 | Status: DISCONTINUED | OUTPATIENT
Start: 2022-01-17 | End: 2022-01-17

## 2022-01-17 RX ADMIN — MORPHINE SULFATE 4 MILLIGRAM(S): 50 CAPSULE, EXTENDED RELEASE ORAL at 22:30

## 2022-01-17 RX ADMIN — Medication 2: at 06:17

## 2022-01-17 RX ADMIN — Medication 25 GRAM(S): at 17:09

## 2022-01-17 RX ADMIN — Medication 10 UNIT(S): at 23:07

## 2022-01-17 RX ADMIN — MORPHINE SULFATE 4 MILLIGRAM(S): 50 CAPSULE, EXTENDED RELEASE ORAL at 14:13

## 2022-01-17 RX ADMIN — MORPHINE SULFATE 2 MILLIGRAM(S): 50 CAPSULE, EXTENDED RELEASE ORAL at 10:56

## 2022-01-17 RX ADMIN — ENOXAPARIN SODIUM 40 MILLIGRAM(S): 100 INJECTION SUBCUTANEOUS at 12:46

## 2022-01-17 RX ADMIN — ATORVASTATIN CALCIUM 20 MILLIGRAM(S): 80 TABLET, FILM COATED ORAL at 21:39

## 2022-01-17 RX ADMIN — PANTOPRAZOLE SODIUM 40 MILLIGRAM(S): 20 TABLET, DELAYED RELEASE ORAL at 17:10

## 2022-01-17 RX ADMIN — MORPHINE SULFATE 2 MILLIGRAM(S): 50 CAPSULE, EXTENDED RELEASE ORAL at 06:07

## 2022-01-17 RX ADMIN — MORPHINE SULFATE 4 MILLIGRAM(S): 50 CAPSULE, EXTENDED RELEASE ORAL at 21:36

## 2022-01-17 RX ADMIN — Medication 100 MILLIGRAM(S): at 12:53

## 2022-01-17 RX ADMIN — LATANOPROST 1 DROP(S): 0.05 SOLUTION/ DROPS OPHTHALMIC; TOPICAL at 23:00

## 2022-01-17 RX ADMIN — MORPHINE SULFATE 4 MILLIGRAM(S): 50 CAPSULE, EXTENDED RELEASE ORAL at 15:24

## 2022-01-17 RX ADMIN — MORPHINE SULFATE 2 MILLIGRAM(S): 50 CAPSULE, EXTENDED RELEASE ORAL at 11:31

## 2022-01-17 RX ADMIN — Medication 10 UNIT(S): at 01:19

## 2022-01-17 RX ADMIN — MORPHINE SULFATE 2 MILLIGRAM(S): 50 CAPSULE, EXTENDED RELEASE ORAL at 06:23

## 2022-01-17 RX ADMIN — MORPHINE SULFATE 2 MILLIGRAM(S): 50 CAPSULE, EXTENDED RELEASE ORAL at 17:14

## 2022-01-17 RX ADMIN — MORPHINE SULFATE 2 MILLIGRAM(S): 50 CAPSULE, EXTENDED RELEASE ORAL at 01:02

## 2022-01-17 RX ADMIN — BRIMONIDINE TARTRATE 1 DROP(S): 2 SOLUTION/ DROPS OPHTHALMIC at 17:09

## 2022-01-17 RX ADMIN — Medication 81 MILLIGRAM(S): at 12:46

## 2022-01-17 RX ADMIN — SODIUM CHLORIDE 200 MILLILITER(S): 9 INJECTION, SOLUTION INTRAVENOUS at 01:20

## 2022-01-17 RX ADMIN — MORPHINE SULFATE 2 MILLIGRAM(S): 50 CAPSULE, EXTENDED RELEASE ORAL at 01:20

## 2022-01-17 RX ADMIN — Medication 100 MILLIGRAM(S): at 20:58

## 2022-01-17 RX ADMIN — MORPHINE SULFATE 2 MILLIGRAM(S): 50 CAPSULE, EXTENDED RELEASE ORAL at 18:50

## 2022-01-17 RX ADMIN — ONDANSETRON 4 MILLIGRAM(S): 8 TABLET, FILM COATED ORAL at 14:13

## 2022-01-17 NOTE — PROGRESS NOTE ADULT - ASSESSMENT
71 yr old female with hypertension, hyperlipidemia, chronic lower back pain, chronic pancreatitis with pancreatic insufficiency, GERD presented with complaints of abdominal pain, nausea, vomiting, altered mental status. Patient noted to be febrile, confused, labs revealed elevated lipase, WBC and LFTs. CT abdomen was done revealed acute pancreatitis without necrosis. She was given IV Tylenol, fluids and pain control. Cultures were sent.  71 yr old female with hypertension, hyperlipidemia, chronic lower back pain, chronic pancreatitis with pancreatic insufficiency, GERD presented with complaints of abdominal pain, nausea, vomiting, altered mental status. Patient noted to be febrile, confused, labs revealed elevated lipase, WBC and LFTs. CT abdomen was done revealed acute pancreatitis without necrosis. She was given IV Tylenol, fluids and pain control. Cultures were sent. Her blood cultures were positive for E coli, Azactam was added.     1. SIRS sec to acute pancreatitis:  NPO at this time, attempt clears.  Trend amylase and Lipase  Monitor electrolytes  GI evaluation. Patient with third episode of pancreatitis, reports no etiology identified, denies alcohol abuse.    2. Sepsis sec E coli bacteremia:  No urinary complaints  Start Azactam, UA unremarkable, CT abdomen noted.  Repeat cultures.    3. Metabolic encephalopathy:  Present on admission, now resolved  likely from sepsis.    4. Diabetes mellitus:  Continue Lantus, monitor FS, sliding scale coverage.    5. Transaminitis:  Noted to have baseline elevated LFTs from 2019  no prior GI eval.  Will request consult  trend LFTs, prior cholecystectomy.  normal liver on CT. ? related to sepsis. check US abdomen.    6. Hypertension:  Continue Lisinopril and Metoprolol.    7. DVT ppx:  Continue Lovenox.    Discussed with patient and RN.  Updated spouse Niko.    71 yr old female with hypertension, hyperlipidemia, chronic lower back pain, chronic pancreatitis with pancreatic insufficiency, GERD presented with complaints of abdominal pain, nausea, vomiting, altered mental status. Patient noted to be febrile, confused, labs revealed elevated lipase, WBC and LFTs. CT abdomen was done revealed acute pancreatitis without necrosis. She was given IV Tylenol, fluids and pain control. Cultures were sent. Her blood cultures were positive for E coli, Azactam was added.     1. SIRS sec to acute pancreatitis:  NPO at this time, attempt clears.  Trend amylase and Lipase  Monitor electrolytes  GI evaluation. Patient with third episode of pancreatitis, reports no etiology identified, denies alcohol abuse.    2. Sepsis sec E coli bacteremia:  No urinary complaints  Start Azactam, UA unremarkable, CT abdomen noted.  Repeat cultures.    3. Metabolic encephalopathy:  Present on admission, now resolved  likely from sepsis.    4. Diabetes mellitus:  Continue Lantus, monitor FS, sliding scale coverage.    5. Transaminitis:  Noted to have baseline elevated LFTs from 2019  no prior GI eval.  Will request consult  trend LFTs, prior cholecystectomy.  normal liver on CT. ? related to sepsis. check US abdomen.    6. Hypertension:  Continue Lisinopril and Metoprolol.    7. DVT ppx:  Continue Lovenox.    8. Hypomagnesemia:  Replace Mg and monitor.    Discussed with patient and RN.  Updated spouse Niko.

## 2022-01-17 NOTE — H&P ADULT - ASSESSMENT
ASSESSMENT:  71F with PMHX IDDM, HTN, HLD, Chronic LBP, GERD, Chronic Pancreatitis c/b Pancreatic Insuffiency BIBEMS to [facility] ER c/o AMS, abdominal pain, and multiple episodes of nausea/vomiting admitted for AMS 2/2 Acute on Chronic Pancreatitis.     PLAN:  Acute on Chronic Pancreatitis c/b Pancreatic Insufficiency  -Admit Telemetry  -Lipase 2724  -CTPA IVC +Acute Pancreatitis. No Necrosis/Fluid Collection/Abscess  -2L IVFB NSS in ED  -Aggressive IVF Resuccitation  -Change to LR 200cc/hr  -Vanco and Aztreonam given in ED will hold ABX for now as likely source of sepsis is uncomplicated Pancreatitis  -Defer RUQ US as patient s/p cholecystectomy  -Check IGG4  -Denies ETOH use  -Trigs 37  -Trend Amylase/Lipase  -Trend LFTs  -Pain Control Morphine IV PRN  -VTE PPX Lovenox   -NPO   -Add back Creon when tolerating PO intake     AMS  -2/2 Fever from Acute on Chronic Pancreatitis as AMS resolved with IV Tylenol  -CTH WO Negative  -Plan as above    IDDM  -Levemir 12 units QHS lower to 10 units while inpatient/NPO  -ISS. Accuchecks. A1C. NPO.   -Hold Metformin 500mg q24    Chronic LBP  -Change Oxycodone 5mg q6 to Morphine IV PRN while NPO    HTN, HLD  -Toprol XL 75mg PO q24  -Ramipril 5mg PO sub Lisinopril  -Atorvastatin 20mg PO QHS    GERD  -PPI 40mg PO BID

## 2022-01-17 NOTE — H&P ADULT - NSHPLABSRESULTS_GEN_ALL_CORE
CTH WO: IMPRESSION:  No acute intracranial hemorrhage, mass effect, or CT evidence of an acute   vascular territorial infarct.  Mild chronic ischemic changes in the frontoparietal white matter.    CTAP IVC: IMPRESSION:  Acute pancreatitis. No evidence of pancreatic necrosis. No abnormal fluid   collections.

## 2022-01-17 NOTE — PATIENT PROFILE ADULT - FALL HARM RISK - RISK INTERVENTIONS

## 2022-01-17 NOTE — PROGRESS NOTE ADULT - SUBJECTIVE AND OBJECTIVE BOX
INTERVAL HPI/OVERNIGHT EVENTS:    CC:    Vital Signs Last 24 Hrs  T(C): 36.9 (2022 04:36), Max: 40.4 (2022 20:15)  T(F): 98.5 (2022 04:36), Max: 104.7 (2022 20:15)  HR: 81 (2022 04:36) (81 - 113)  BP: 99/63 (2022 05:57) (96/61 - 154/63)  BP(mean): --  RR: 18 (2022 04:36) (18 - 18)  SpO2: 98% (2022 04:36) (87% - 100%)    PHYSICAL EXAM:    GENERAL:   CHEST/LUNG:   HEART:   ABDOMEN:   EXTREMITIES:      MEDICATIONS  (STANDING):  aspirin enteric coated 81 milliGRAM(s) Oral daily  atorvastatin 20 milliGRAM(s) Oral at bedtime  dextrose 40% Gel 15 Gram(s) Oral once  dextrose 5%. 1000 milliLiter(s) (50 mL/Hr) IV Continuous <Continuous>  dextrose 5%. 1000 milliLiter(s) (100 mL/Hr) IV Continuous <Continuous>  dextrose 50% Injectable 25 Gram(s) IV Push once  dextrose 50% Injectable 12.5 Gram(s) IV Push once  dextrose 50% Injectable 25 Gram(s) IV Push once  enoxaparin Injectable 40 milliGRAM(s) SubCutaneous daily  glucagon  Injectable 1 milliGRAM(s) IntraMuscular once  insulin detemir injectable (LEVEMIR) 10 Unit(s) SubCutaneous at bedtime  insulin lispro (ADMELOG) corrective regimen sliding scale   SubCutaneous every 6 hours  lactated ringers. 1000 milliLiter(s) (200 mL/Hr) IV Continuous <Continuous>  lisinopril 20 milliGRAM(s) Oral daily  metoprolol succinate ER 75 milliGRAM(s) Oral daily  pantoprazole    Tablet 40 milliGRAM(s) Oral two times a day    MEDICATIONS  (PRN):  acetaminophen     Tablet .. 650 milliGRAM(s) Oral every 6 hours PRN Temp greater or equal to 38C (100.4F), Mild Pain (1 - 3)  ALPRAZolam 0.25 milliGRAM(s) Oral at bedtime PRN Anxiety  aluminum hydroxide/magnesium hydroxide/simethicone Suspension 30 milliLiter(s) Oral every 4 hours PRN Dyspepsia  melatonin 3 milliGRAM(s) Oral at bedtime PRN Insomnia  morphine  - Injectable 2 milliGRAM(s) IV Push every 4 hours PRN Moderate Pain (4 - 6)  morphine  - Injectable 4 milliGRAM(s) IV Push every 4 hours PRN Severe Pain (7 - 10)  ondansetron Injectable 4 milliGRAM(s) IV Push every 8 hours PRN Nausea and/or Vomiting      Allergies    Keflex (Anaphylaxis)  penicillin (Anaphylaxis)    Intolerances          LABS:                          9.6    21.79 )-----------( 188      ( 2022 03:03 )             29.2         138  |  102  |  33.6<H>  ----------------------------<  269<H>  4.0   |  22.0  |  1.08    Ca    8.7      2022 03:03  Phos  2.3       Mg     1.5         TPro  6.0<L>  /  Alb  3.2<L>  /  TBili  1.6  /  DBili  x   /  AST  505<H>  /  ALT  476<H>  /  AlkPhos  173<H>      PT/INR - ( 2022 03:03 )   PT: 11.9 sec;   INR: 1.03 ratio         PTT - ( 2022 03:03 )  PTT:23.0 sec  Urinalysis Basic - ( 2022 20:38 )    Color: Yellow / Appearance: Clear / S.010 / pH: x  Gluc: x / Ketone: Negative  / Bili: Negative / Urobili: Negative mg/dL   Blood: x / Protein: 15 / Nitrite: Negative   Leuk Esterase: Negative / RBC: 6-10 /HPF / WBC 0-2   Sq Epi: x / Non Sq Epi: Occasional / Bacteria: Occasional        RADIOLOGY & ADDITIONAL TESTS:   INTERVAL HPI/OVERNIGHT EVENTS:    CC: sepsis sec e coli bacteremia, SIRS sec pancreatitis, transaminitis, diabetes mellitus      Chart and course reviewed, denies nausea now, states has mild abdominal discomfort. history of cholecystectomy.    Vital Signs Last 24 Hrs  T(C): 36.9 (2022 04:36), Max: 40.4 (2022 20:15)  T(F): 98.5 (2022 04:36), Max: 104.7 (2022 20:15)  HR: 81 (2022 04:36) (81 - 113)  BP: 99/63 (2022 05:57) (96/61 - 154/63)  BP(mean): --  RR: 18 (2022 04:36) (18 - 18)  SpO2: 98% (2022 04:36) (87% - 100%)    PHYSICAL EXAM:    GENERAL: alert, not in distress, oriented x 3  CHEST/LUNG: b/l air entry  HEART: reg  ABDOMEN: soft, bs+  EXTREMITIES:  no edema, tenderness    MEDICATIONS  (STANDING):  aspirin enteric coated 81 milliGRAM(s) Oral daily  atorvastatin 20 milliGRAM(s) Oral at bedtime  dextrose 40% Gel 15 Gram(s) Oral once  dextrose 5%. 1000 milliLiter(s) (50 mL/Hr) IV Continuous <Continuous>  dextrose 5%. 1000 milliLiter(s) (100 mL/Hr) IV Continuous <Continuous>  dextrose 50% Injectable 25 Gram(s) IV Push once  dextrose 50% Injectable 12.5 Gram(s) IV Push once  dextrose 50% Injectable 25 Gram(s) IV Push once  enoxaparin Injectable 40 milliGRAM(s) SubCutaneous daily  glucagon  Injectable 1 milliGRAM(s) IntraMuscular once  insulin detemir injectable (LEVEMIR) 10 Unit(s) SubCutaneous at bedtime  insulin lispro (ADMELOG) corrective regimen sliding scale   SubCutaneous every 6 hours  lactated ringers. 1000 milliLiter(s) (200 mL/Hr) IV Continuous <Continuous>  lisinopril 20 milliGRAM(s) Oral daily  metoprolol succinate ER 75 milliGRAM(s) Oral daily  pantoprazole    Tablet 40 milliGRAM(s) Oral two times a day    MEDICATIONS  (PRN):  acetaminophen     Tablet .. 650 milliGRAM(s) Oral every 6 hours PRN Temp greater or equal to 38C (100.4F), Mild Pain (1 - 3)  ALPRAZolam 0.25 milliGRAM(s) Oral at bedtime PRN Anxiety  aluminum hydroxide/magnesium hydroxide/simethicone Suspension 30 milliLiter(s) Oral every 4 hours PRN Dyspepsia  melatonin 3 milliGRAM(s) Oral at bedtime PRN Insomnia  morphine  - Injectable 2 milliGRAM(s) IV Push every 4 hours PRN Moderate Pain (4 - 6)  morphine  - Injectable 4 milliGRAM(s) IV Push every 4 hours PRN Severe Pain (7 - 10)  ondansetron Injectable 4 milliGRAM(s) IV Push every 8 hours PRN Nausea and/or Vomiting      Allergies    Keflex (Anaphylaxis)  penicillin (Anaphylaxis)    Intolerances          LABS:                          9.6    21.79 )-----------( 188      ( 2022 03:03 )             29.2         138  |  102  |  33.6<H>  ----------------------------<  269<H>  4.0   |  22.0  |  1.08    Ca    8.7      2022 03:03  Phos  2.3       Mg     1.5         TPro  6.0<L>  /  Alb  3.2<L>  /  TBili  1.6  /  DBili  x   /  AST  505<H>  /  ALT  476<H>  /  AlkPhos  173<H>      PT/INR - ( 2022 03:03 )   PT: 11.9 sec;   INR: 1.03 ratio         PTT - ( 2022 03:03 )  PTT:23.0 sec  Urinalysis Basic - ( 2022 20:38 )    Color: Yellow / Appearance: Clear / S.010 / pH: x  Gluc: x / Ketone: Negative  / Bili: Negative / Urobili: Negative mg/dL   Blood: x / Protein: 15 / Nitrite: Negative   Leuk Esterase: Negative / RBC: 6-10 /HPF / WBC 0-2   Sq Epi: x / Non Sq Epi: Occasional / Bacteria: Occasional        RADIOLOGY & ADDITIONAL TESTS:

## 2022-01-17 NOTE — CONSULT NOTE ADULT - ASSESSMENT
71F with PMHX IDDM, HTN, HLD, Chronic LBP, GERD, Pancreatitis BIBEMS to [facility] ER c/o AMS, abdominal pain, and multiple episodes of nausea/vomiting    Acute interstitial pancreatitis: patient presented with epigastric abdominal pain, elevated lipase, CT showed evidence of pancreatitis, no evidence of necrosis  -NPO , IVF  -RUQ US, MRI/ MRCP ordered    Elevated LT s: Cholangitis? : possibly related to small gallstone/sludge in CBD  -febrile up to 105 in the ER, bcx + GNR  -CT reviewed  -RUQ US, MRI/ MRCP ordered  -IVF and abx  -NPO for possible ERCP tomorrow , AM LABS, hold Lovenox

## 2022-01-17 NOTE — H&P ADULT - HISTORY OF PRESENT ILLNESS
71F with PMHX IDDM, HTN, HLD, Chronic LBP, GERD, Chronic Pancreatitis c/b Pancreatic Insuffiency BIBEMS to [facility] ER c/o AMS, abdominal pain, and multiple episodes of nausea/vomiting. Patient unable to provide HPI/ROS 2/2 AMS initially and further info obtained from Patient's  Niko who spoke to ER and CDU RN. Patient was Febrile in ED Tmax 102.8F in Triage. Given Ofirmev with subsequent improvement in AMS likely fever-mediated. CTAP and Labs indicative of recurrent Pancreatitis. Hx 2 prior similar episodes requiring admission in the past. ROS +Chronic Nausea/Vomiting at least weekly per patient but currently worse. +Epigastric Abd Pain. +Fever/Chills. ROS negative unless mentioned above. +COVID Vaccinated.

## 2022-01-17 NOTE — H&P ADULT - NSHPPHYSICALEXAM_GEN_ALL_CORE
Vital Signs Last 24 Hrs  T(C): 37.6 (16 Jan 2022 22:30), Max: 40.4 (16 Jan 2022 20:15)  T(F): 99.6 (16 Jan 2022 22:30), Max: 104.7 (16 Jan 2022 20:15)  HR: 101 (16 Jan 2022 22:30) (100 - 113)  BP: 125/58 (16 Jan 2022 22:07) (125/58 - 154/63)  BP(mean): --  RR: 18 (16 Jan 2022 22:30) (18 - 18)  SpO2: 97% (16 Jan 2022 22:30) (87% - 100%)    Constitutional: Well-appearing, NAD, VSS  Head: NC/AT  Eyes: PERRL, EOMI, anicteric sclera, conjunctiva WNL  ENT: Normal Pharynx, MMM, No tonsillar exudate/erythema  Neck: Supple, Non-tender  Chest: Non-tender, no rashes  Cardio: RRR, s1/s2, no appreciable murmurs/rubs/gallops  Resp: BS CTA bilaterally, no wheezing/rhonchi/rales  Abd: Soft, Non-distended, +Epigastric TTP no r/g/r  : not examined  Rectal: not examined  MSK: moving all extremities, no motor weakness, full ROM x4  Ext: palpable distal pulses, good capillary refill  Psych: appropriate, cooperative  Neuro: CN II-XII grossly intact, no focal deficits  Skin: Warm/Dry. No rashes. +Diabetes BG Monitor CARONE

## 2022-01-18 ENCOUNTER — TRANSCRIPTION ENCOUNTER (OUTPATIENT)
Age: 72
End: 2022-01-18

## 2022-01-18 ENCOUNTER — RESULT REVIEW (OUTPATIENT)
Age: 72
End: 2022-01-18

## 2022-01-18 LAB
ALBUMIN SERPL ELPH-MCNC: 2.9 G/DL — LOW (ref 3.3–5.2)
ALP SERPL-CCNC: 148 U/L — HIGH (ref 40–120)
ALT FLD-CCNC: 276 U/L — HIGH
AMYLASE P1 CFR SERPL: 125 U/L — SIGNIFICANT CHANGE UP (ref 36–128)
ANION GAP SERPL CALC-SCNC: 11 MMOL/L — SIGNIFICANT CHANGE UP (ref 5–17)
AST SERPL-CCNC: 172 U/L — HIGH
BASOPHILS # BLD AUTO: 0.01 K/UL — SIGNIFICANT CHANGE UP (ref 0–0.2)
BASOPHILS NFR BLD AUTO: 0.1 % — SIGNIFICANT CHANGE UP (ref 0–2)
BILIRUB SERPL-MCNC: 2.5 MG/DL — HIGH (ref 0.4–2)
BLD GP AB SCN SERPL QL: SIGNIFICANT CHANGE UP
BUN SERPL-MCNC: 20.6 MG/DL — HIGH (ref 8–20)
CALCIUM SERPL-MCNC: 8.8 MG/DL — SIGNIFICANT CHANGE UP (ref 8.6–10.2)
CHLORIDE SERPL-SCNC: 103 MMOL/L — SIGNIFICANT CHANGE UP (ref 98–107)
CO2 SERPL-SCNC: 24 MMOL/L — SIGNIFICANT CHANGE UP (ref 22–29)
CREAT SERPL-MCNC: 0.86 MG/DL — SIGNIFICANT CHANGE UP (ref 0.5–1.3)
CULTURE RESULTS: NO GROWTH — SIGNIFICANT CHANGE UP
EOSINOPHIL # BLD AUTO: 0.03 K/UL — SIGNIFICANT CHANGE UP (ref 0–0.5)
EOSINOPHIL NFR BLD AUTO: 0.3 % — SIGNIFICANT CHANGE UP (ref 0–6)
GLUCOSE BLDC GLUCOMTR-MCNC: 110 MG/DL — HIGH (ref 70–99)
GLUCOSE BLDC GLUCOMTR-MCNC: 119 MG/DL — HIGH (ref 70–99)
GLUCOSE BLDC GLUCOMTR-MCNC: 138 MG/DL — HIGH (ref 70–99)
GLUCOSE BLDC GLUCOMTR-MCNC: 63 MG/DL — LOW (ref 70–99)
GLUCOSE BLDC GLUCOMTR-MCNC: 77 MG/DL — SIGNIFICANT CHANGE UP (ref 70–99)
GLUCOSE SERPL-MCNC: 79 MG/DL — SIGNIFICANT CHANGE UP (ref 70–99)
HCT VFR BLD CALC: 25.4 % — LOW (ref 34.5–45)
HGB BLD-MCNC: 8.2 G/DL — LOW (ref 11.5–15.5)
IMM GRANULOCYTES NFR BLD AUTO: 0.5 % — SIGNIFICANT CHANGE UP (ref 0–1.5)
LIDOCAIN IGE QN: 27 U/L — SIGNIFICANT CHANGE UP (ref 22–51)
LYMPHOCYTES # BLD AUTO: 0.93 K/UL — LOW (ref 1–3.3)
LYMPHOCYTES # BLD AUTO: 10.7 % — LOW (ref 13–44)
MAGNESIUM SERPL-MCNC: 1.9 MG/DL — SIGNIFICANT CHANGE UP (ref 1.6–2.6)
MCHC RBC-ENTMCNC: 29.8 PG — SIGNIFICANT CHANGE UP (ref 27–34)
MCHC RBC-ENTMCNC: 32.3 GM/DL — SIGNIFICANT CHANGE UP (ref 32–36)
MCV RBC AUTO: 92.4 FL — SIGNIFICANT CHANGE UP (ref 80–100)
MONOCYTES # BLD AUTO: 0.59 K/UL — SIGNIFICANT CHANGE UP (ref 0–0.9)
MONOCYTES NFR BLD AUTO: 6.8 % — SIGNIFICANT CHANGE UP (ref 2–14)
NEUTROPHILS # BLD AUTO: 7.06 K/UL — SIGNIFICANT CHANGE UP (ref 1.8–7.4)
NEUTROPHILS NFR BLD AUTO: 81.6 % — HIGH (ref 43–77)
PHOSPHATE SERPL-MCNC: 2.9 MG/DL — SIGNIFICANT CHANGE UP (ref 2.4–4.7)
PLATELET # BLD AUTO: 131 K/UL — LOW (ref 150–400)
POTASSIUM SERPL-MCNC: 3.8 MMOL/L — SIGNIFICANT CHANGE UP (ref 3.5–5.3)
POTASSIUM SERPL-SCNC: 3.8 MMOL/L — SIGNIFICANT CHANGE UP (ref 3.5–5.3)
PROT SERPL-MCNC: 5.3 G/DL — LOW (ref 6.6–8.7)
RBC # BLD: 2.75 M/UL — LOW (ref 3.8–5.2)
RBC # FLD: 12.6 % — SIGNIFICANT CHANGE UP (ref 10.3–14.5)
SODIUM SERPL-SCNC: 138 MMOL/L — SIGNIFICANT CHANGE UP (ref 135–145)
SPECIMEN SOURCE: SIGNIFICANT CHANGE UP
WBC # BLD: 8.66 K/UL — SIGNIFICANT CHANGE UP (ref 3.8–10.5)
WBC # FLD AUTO: 8.66 K/UL — SIGNIFICANT CHANGE UP (ref 3.8–10.5)

## 2022-01-18 PROCEDURE — 99223 1ST HOSP IP/OBS HIGH 75: CPT

## 2022-01-18 PROCEDURE — 43274 ERCP DUCT STENT PLACEMENT: CPT | Mod: 59

## 2022-01-18 PROCEDURE — 99233 SBSQ HOSP IP/OBS HIGH 50: CPT

## 2022-01-18 PROCEDURE — 88305 TISSUE EXAM BY PATHOLOGIST: CPT | Mod: 26

## 2022-01-18 PROCEDURE — 88342 IMHCHEM/IMCYTCHM 1ST ANTB: CPT | Mod: 26

## 2022-01-18 PROCEDURE — 99233 SBSQ HOSP IP/OBS HIGH 50: CPT | Mod: 25

## 2022-01-18 PROCEDURE — 43259 EGD US EXAM DUODENUM/JEJUNUM: CPT | Mod: 59

## 2022-01-18 DEVICE — STENT BIL PIGTAIL ADVANIX DBL 7FRX3CM: Type: IMPLANTABLE DEVICE | Status: FUNCTIONAL

## 2022-01-18 DEVICE — GWIRE VISIGLIDE STRT TIP 270CM .035: Type: IMPLANTABLE DEVICE | Status: FUNCTIONAL

## 2022-01-18 DEVICE — STENT PANC ZIMMON 5FRX6CM: Type: IMPLANTABLE DEVICE | Status: FUNCTIONAL

## 2022-01-18 DEVICE — STENT BIL W NAVIFLEX DELIVERY 7FR: Type: IMPLANTABLE DEVICE | Status: FUNCTIONAL

## 2022-01-18 DEVICE — CATH BLLN BIL RX 9-12CM: Type: IMPLANTABLE DEVICE | Status: FUNCTIONAL

## 2022-01-18 DEVICE — HYDRATOME 44: Type: IMPLANTABLE DEVICE | Status: FUNCTIONAL

## 2022-01-18 RX ORDER — MEROPENEM 1 G/30ML
1000 INJECTION INTRAVENOUS EVERY 8 HOURS
Refills: 0 | Status: DISCONTINUED | OUTPATIENT
Start: 2022-01-18 | End: 2022-01-23

## 2022-01-18 RX ORDER — INDOMETHACIN 50 MG
100 CAPSULE ORAL ONCE
Refills: 0 | Status: DISCONTINUED | OUTPATIENT
Start: 2022-01-18 | End: 2022-01-23

## 2022-01-18 RX ORDER — DEXTROSE 50 % IN WATER 50 %
12.5 SYRINGE (ML) INTRAVENOUS ONCE
Refills: 0 | Status: COMPLETED | OUTPATIENT
Start: 2022-01-18 | End: 2022-01-18

## 2022-01-18 RX ADMIN — Medication 81 MILLIGRAM(S): at 11:52

## 2022-01-18 RX ADMIN — MORPHINE SULFATE 4 MILLIGRAM(S): 50 CAPSULE, EXTENDED RELEASE ORAL at 20:15

## 2022-01-18 RX ADMIN — MEROPENEM 100 MILLIGRAM(S): 1 INJECTION INTRAVENOUS at 22:02

## 2022-01-18 RX ADMIN — MORPHINE SULFATE 4 MILLIGRAM(S): 50 CAPSULE, EXTENDED RELEASE ORAL at 04:00

## 2022-01-18 RX ADMIN — Medication 100 MILLIGRAM(S): at 11:51

## 2022-01-18 RX ADMIN — MORPHINE SULFATE 4 MILLIGRAM(S): 50 CAPSULE, EXTENDED RELEASE ORAL at 17:10

## 2022-01-18 RX ADMIN — MORPHINE SULFATE 4 MILLIGRAM(S): 50 CAPSULE, EXTENDED RELEASE ORAL at 21:15

## 2022-01-18 RX ADMIN — LATANOPROST 1 DROP(S): 0.05 SOLUTION/ DROPS OPHTHALMIC; TOPICAL at 21:17

## 2022-01-18 RX ADMIN — PANTOPRAZOLE SODIUM 40 MILLIGRAM(S): 20 TABLET, DELAYED RELEASE ORAL at 19:13

## 2022-01-18 RX ADMIN — BRIMONIDINE TARTRATE 1 DROP(S): 2 SOLUTION/ DROPS OPHTHALMIC at 05:36

## 2022-01-18 RX ADMIN — SODIUM CHLORIDE 200 MILLILITER(S): 9 INJECTION, SOLUTION INTRAVENOUS at 21:16

## 2022-01-18 RX ADMIN — MORPHINE SULFATE 4 MILLIGRAM(S): 50 CAPSULE, EXTENDED RELEASE ORAL at 12:10

## 2022-01-18 RX ADMIN — BRIMONIDINE TARTRATE 1 DROP(S): 2 SOLUTION/ DROPS OPHTHALMIC at 21:16

## 2022-01-18 RX ADMIN — Medication 10 UNIT(S): at 23:20

## 2022-01-18 RX ADMIN — SODIUM CHLORIDE 200 MILLILITER(S): 9 INJECTION, SOLUTION INTRAVENOUS at 05:35

## 2022-01-18 RX ADMIN — Medication 100 MILLIGRAM(S): at 04:51

## 2022-01-18 RX ADMIN — Medication 12.5 GRAM(S): at 08:37

## 2022-01-18 RX ADMIN — MORPHINE SULFATE 4 MILLIGRAM(S): 50 CAPSULE, EXTENDED RELEASE ORAL at 08:35

## 2022-01-18 RX ADMIN — MORPHINE SULFATE 4 MILLIGRAM(S): 50 CAPSULE, EXTENDED RELEASE ORAL at 08:20

## 2022-01-18 RX ADMIN — ATORVASTATIN CALCIUM 20 MILLIGRAM(S): 80 TABLET, FILM COATED ORAL at 21:16

## 2022-01-18 RX ADMIN — SODIUM CHLORIDE 200 MILLILITER(S): 9 INJECTION, SOLUTION INTRAVENOUS at 00:39

## 2022-01-18 RX ADMIN — MORPHINE SULFATE 4 MILLIGRAM(S): 50 CAPSULE, EXTENDED RELEASE ORAL at 11:58

## 2022-01-18 RX ADMIN — MORPHINE SULFATE 4 MILLIGRAM(S): 50 CAPSULE, EXTENDED RELEASE ORAL at 02:57

## 2022-01-18 RX ADMIN — MORPHINE SULFATE 4 MILLIGRAM(S): 50 CAPSULE, EXTENDED RELEASE ORAL at 17:01

## 2022-01-18 NOTE — PROGRESS NOTE ADULT - ASSESSMENT
Biliary pancreatitis and cholangitis: Continue antibiotics. Will schedule EUS/ERCP today. Keep NPO. IV  fluids. Further recommendations.

## 2022-01-18 NOTE — PROGRESS NOTE ADULT - SUBJECTIVE AND OBJECTIVE BOX
INTERVAL HPI/OVERNIGHT EVENTS:Patient seen and examined. Still having abdominal pain. WBC count is improving. She is scheduled for EUS/ERCP today.     MEDICATIONS  (STANDING):  aspirin enteric coated 81 milliGRAM(s) Oral daily  atorvastatin 20 milliGRAM(s) Oral at bedtime  aztreonam  IVPB 2000 milliGRAM(s) IV Intermittent every 8 hours  brimonidine 0.2% Ophthalmic Solution 1 Drop(s) Both EYES two times a day  dextrose 40% Gel 15 Gram(s) Oral once  dextrose 5%. 1000 milliLiter(s) (50 mL/Hr) IV Continuous <Continuous>  dextrose 5%. 1000 milliLiter(s) (100 mL/Hr) IV Continuous <Continuous>  dextrose 50% Injectable 25 Gram(s) IV Push once  dextrose 50% Injectable 12.5 Gram(s) IV Push once  dextrose 50% Injectable 25 Gram(s) IV Push once  glucagon  Injectable 1 milliGRAM(s) IntraMuscular once  indomethacin Suppository 100 milliGRAM(s) Rectal once  insulin detemir injectable (LEVEMIR) 10 Unit(s) SubCutaneous at bedtime  insulin lispro (ADMELOG) corrective regimen sliding scale   SubCutaneous every 6 hours  lactated ringers. 1000 milliLiter(s) (200 mL/Hr) IV Continuous <Continuous>  latanoprost 0.005% Ophthalmic Solution 1 Drop(s) Both EYES at bedtime  lisinopril 20 milliGRAM(s) Oral daily  metoprolol succinate ER 75 milliGRAM(s) Oral daily  pantoprazole    Tablet 40 milliGRAM(s) Oral two times a day    MEDICATIONS  (PRN):  acetaminophen     Tablet .. 650 milliGRAM(s) Oral every 6 hours PRN Temp greater or equal to 38C (100.4F), Mild Pain (1 - 3)  ALPRAZolam 0.25 milliGRAM(s) Oral at bedtime PRN Anxiety  aluminum hydroxide/magnesium hydroxide/simethicone Suspension 30 milliLiter(s) Oral every 4 hours PRN Dyspepsia  melatonin 3 milliGRAM(s) Oral at bedtime PRN Insomnia  morphine  - Injectable 2 milliGRAM(s) IV Push every 4 hours PRN Moderate Pain (4 - 6)  morphine  - Injectable 4 milliGRAM(s) IV Push every 4 hours PRN Severe Pain (7 - 10)  ondansetron Injectable 4 milliGRAM(s) IV Push every 8 hours PRN Nausea and/or Vomiting      Allergies    Keflex (Anaphylaxis)  penicillin (Anaphylaxis)    Intolerances        Vital Signs Last 24 Hrs  T(C): 37.8 (2022 04:35), Max: 37.8 (2022 04:35)  T(F): 100 (2022 04:35), Max: 100 (2022 04:35)  HR: 86 (2022 04:35) (83 - 93)  BP: 123/67 (2022 04:35) (105/51 - 127/64)  BP(mean): --  RR: 18 (2022 04:35) (17 - 18)  SpO2: 93% (2022 04:35) (93% - 100%)    LABS:                        8.2    8.66  )-----------( 131      ( 2022 06:36 )             25.4     01-18    138  |  103  |  20.6<H>  ----------------------------<  79  3.8   |  24.0  |  0.86    Ca    8.8      2022 06:36  Phos  2.9     -18  Mg     1.9     -18    TPro  5.3<L>  /  Alb  2.9<L>  /  TBili  2.5<H>  /  DBili  x   /  AST  172<H>  /  ALT  276<H>  /  AlkPhos  148<H>  18    PT/INR - ( 2022 03:03 )   PT: 11.9 sec;   INR: 1.03 ratio         PTT - ( 2022 03:03 )  PTT:23.0 sec  Urinalysis Basic - ( 2022 20:38 )    Color: Yellow / Appearance: Clear / S.010 / pH: x  Gluc: x / Ketone: Negative  / Bili: Negative / Urobili: Negative mg/dL   Blood: x / Protein: 15 / Nitrite: Negative   Leuk Esterase: Negative / RBC: 6-10 /HPF / WBC 0-2   Sq Epi: x / Non Sq Epi: Occasional / Bacteria: Occasional        RADIOLOGY & ADDITIONAL TESTS:

## 2022-01-18 NOTE — PROGRESS NOTE ADULT - SUBJECTIVE AND OBJECTIVE BOX
INTERVAL HPI/OVERNIGHT EVENTS:    CC: severe sepsis and metabolic encephalopathy sec possible cholangitis and E coli bacteremia improving, acute pancreatitis, hypertension, diabetes mellitus    No overnight events, still with some abdominal discomfort, feels better overall. NPO, no nausea, vomiting.    Vital Signs Last 24 Hrs  T(C): 37.8 (2022 04:35), Max: 37.8 (2022 04:35)  T(F): 100 (2022 04:35), Max: 100 (2022 04:35)  HR: 86 (2022 04:35) (83 - 93)  BP: 123/67 (2022 04:35) (105/51 - 127/64)  BP(mean): --  RR: 18 (2022 04:35) (17 - 18)  SpO2: 93% (2022 04:35) (93% - 100%)    PHYSICAL EXAM:    GENERAL: alert, not in distress  CHEST/LUNG: b/l air entry  HEART: reg  ABDOMEN: soft, tenderness in epigastric region  EXTREMITIES: no edema, tenderness     MEDICATIONS  (STANDING):  aspirin enteric coated 81 milliGRAM(s) Oral daily  atorvastatin 20 milliGRAM(s) Oral at bedtime  aztreonam  IVPB 2000 milliGRAM(s) IV Intermittent every 8 hours  brimonidine 0.2% Ophthalmic Solution 1 Drop(s) Both EYES two times a day  dextrose 40% Gel 15 Gram(s) Oral once  dextrose 5%. 1000 milliLiter(s) (50 mL/Hr) IV Continuous <Continuous>  dextrose 5%. 1000 milliLiter(s) (100 mL/Hr) IV Continuous <Continuous>  dextrose 50% Injectable 25 Gram(s) IV Push once  dextrose 50% Injectable 12.5 Gram(s) IV Push once  dextrose 50% Injectable 25 Gram(s) IV Push once  glucagon  Injectable 1 milliGRAM(s) IntraMuscular once  indomethacin Suppository 100 milliGRAM(s) Rectal once  insulin detemir injectable (LEVEMIR) 10 Unit(s) SubCutaneous at bedtime  insulin lispro (ADMELOG) corrective regimen sliding scale   SubCutaneous every 6 hours  lactated ringers. 1000 milliLiter(s) (200 mL/Hr) IV Continuous <Continuous>  latanoprost 0.005% Ophthalmic Solution 1 Drop(s) Both EYES at bedtime  lisinopril 20 milliGRAM(s) Oral daily  metoprolol succinate ER 75 milliGRAM(s) Oral daily  pantoprazole    Tablet 40 milliGRAM(s) Oral two times a day    MEDICATIONS  (PRN):  acetaminophen     Tablet .. 650 milliGRAM(s) Oral every 6 hours PRN Temp greater or equal to 38C (100.4F), Mild Pain (1 - 3)  ALPRAZolam 0.25 milliGRAM(s) Oral at bedtime PRN Anxiety  aluminum hydroxide/magnesium hydroxide/simethicone Suspension 30 milliLiter(s) Oral every 4 hours PRN Dyspepsia  melatonin 3 milliGRAM(s) Oral at bedtime PRN Insomnia  morphine  - Injectable 2 milliGRAM(s) IV Push every 4 hours PRN Moderate Pain (4 - 6)  morphine  - Injectable 4 milliGRAM(s) IV Push every 4 hours PRN Severe Pain (7 - 10)  ondansetron Injectable 4 milliGRAM(s) IV Push every 8 hours PRN Nausea and/or Vomiting      Allergies    Keflex (Anaphylaxis)  penicillin (Anaphylaxis)    Intolerances          LABS:                          8.2    8.66  )-----------( 131      ( 2022 06:36 )             25.4     01-18    138  |  103  |  20.6<H>  ----------------------------<  79  3.8   |  24.0  |  0.86    Ca    8.8      2022 06:36  Phos  2.9     -18  Mg     1.9         TPro  5.3<L>  /  Alb  2.9<L>  /  TBili  2.5<H>  /  DBili  x   /  AST  172<H>  /  ALT  276<H>  /  AlkPhos  148<H>  -18    PT/INR - ( 2022 03:03 )   PT: 11.9 sec;   INR: 1.03 ratio         PTT - ( 2022 03:03 )  PTT:23.0 sec  Urinalysis Basic - ( 2022 20:38 )    Color: Yellow / Appearance: Clear / S.010 / pH: x  Gluc: x / Ketone: Negative  / Bili: Negative / Urobili: Negative mg/dL   Blood: x / Protein: 15 / Nitrite: Negative   Leuk Esterase: Negative / RBC: 6-10 /HPF / WBC 0-2   Sq Epi: x / Non Sq Epi: Occasional / Bacteria: Occasional        RADIOLOGY & ADDITIONAL TESTS:

## 2022-01-18 NOTE — CONSULT NOTE ADULT - REASON FOR ADMISSION
"12/27/2021       RE: Max Meade  [address]  [place] [address]     Dear Colleague,    Thank you for referring your patient, Max Meade, to the [facility] [place] at [facility]. Please see a copy of my visit note below.    Max Meade is a 43 year old male who is being evaluated via a billable video visit.      The patient has been notified of following:     \"This video visit will be conducted via a call between you and your physician/provider. We have found that certain health care needs can be provided without the need for an in-person physical exam.  This service lets us provide the care you need with a video conversation.  If a prescription is necessary we can send it directly to your pharmacy.  If lab work is needed we can place an order for that and you can then stop by our lab to have the test done at a later time.    Video visits are billed at different rates depending on your insurance coverage.  Please reach out to your insurance provider with any questions.    If during the course of the call the physician/provider feels a video visit is not appropriate, you will not be charged for this service.\"    Patient has given verbal consent for Video visit? Yes  How would you like to obtain your AVS? MyChart  Will anyone else be joining your video visit? No  {If patient encounters technical issues they should call [phone]      Video-Visit Details    Type of service:  Video Visit    Video Start Time: 12:28 PM  Video End Time: 1:11 PM    Originating Location (pt. Location): Home    Distant Location (provider location):  [facility] [place]     Platform used for Video Visit: [employer]    During this virtual visit the patient is located in [place], patient verifies this as the location during the entirety of this visit.     New Weight Management Nutrition "
AMS 2/2 Acute on Chronic Pancreatitis
"Consultation    Max Meade is a 43 year old male presents today for new weight management nutrition consultation.  Patient referred by Dr. Hdz on December 27, 2021.    Patient with Co-morbidities of obesity including:  Type II DM yes (insulin dependant, managed inpatient currently)  Renal Failure no  Sleep apnea yes  Hypertension no   Dyslipidemia no  Joint pain yes  Back pain yes  GERD no     Anthropometrics:  Estimated body mass index is 81.19 kg/m  as calculated from the following:    Height as of an earlier encounter on 12/27/21: 1.93 m (6' 4\").    Weight as of an earlier encounter on 12/27/21: 302.5 kg (667 lb).    Medications for Weight Loss:  Pt states he discussed Ozempic with MD today.    NUTRITION HISTORY  See MD note for details.  Per questionnaire, strong emotional component to eating. Readiness and confidence to change, rated 10 (very ready).  Currently admitted (since March) at [facility]. Preparing for weight loss surgery (pt describes the duodenal switch). Needs to lose to 580 lbs for surgery. His goal is to achieve this in next 3 months. Saw [facility] bariatric RD 6 times. Currently working with inpatient RD on weight loss. Has been following 2300 calories per day for last 3-4 months, and has not seen any wt loss, and has actually gained. Reports may be related to fluid retention.  He is getting 3 meals and 1 snack per day from the hospital. Reports no outside foods. He does not like hospital cooked veggies or greek yogurt.  Once per week slice of cake and once per day cookies.    Recent food recall:  Breakfast: Yakut toast, 1 butler, 1 sausage, 2 eggs  Lunch: Tuna sandwich + vegetables + grapes + rebecca dune cookie and chips and diet pop  Dinner: Chicken [place] + Rebecca Dune (150 ronna) chips(200 chips) + veggies (carrots and cucumber) and banana and diet pop  Snacks: vegetables and rebecca dune and diet pop  Beverages: water with crystal lite (4 cups) and diet pop "
(4 cups)    Alcohol: Never  Dining out: none currently     Physical Activity:  Doing PT/OT and exercise 3 times per week for 30 mins.     Nutrition Prescription  Recommended energy/nutrient modification.  1500 calories/day (per MD)    Nutrition Diagnosis  Obesity r/t long history of positive energy balance aeb BMI >30.    Nutrition Intervention  Materials/education provided on 1500 calorie/day diet for weight loss, Volumetric eating to help satiety level on fewer calories; portion control and healthy food choices. Recommended pt focus on high-protein meals/snack (20-30 gm/meal or snack) and moderation of starches to help with weight loss and prepare for post-DS diet guidelines. Provided education on lean protein sources. Reviewed calorie goals per meal/snack to achieve 1500 calories per day diet. Pt inquiring about liquid diet for last 25 lbs of weight loss, discussed meal replacement criteria and need to discuss with provider to monitor BG and insulin needs to avoid hypoglycemia if using meal replacements.  Patient demonstrates understanding.    Expected Engagement: good  Follow-Up Plans: calorie goals     Nutrition Goals  1) Follow 1500 calorie/day plan   - 10 am: 500 calorie meal   - 2 pm: 400 calorie meal   - 5:30 pm: 400 calorie meal   - 10 pm: 200 ronna calorie snack  2) Aim for 20-30 grams protein per meal/snack  3) Continue to use only calorie-free beverages  4) Eat slowly (20-30 minutes per meal), chewing foods well (25 chews per bite/applesauce consistency)    Meal Replacement Shake Options:   *Protein Shake Criteria: no more than 210 Calories, at least 20 grams of protein, and less than 10 grams of sugar   [facility] smoothie (160 Calories, 20 g protein)   Premier Protein (160 Calories, 30 g protein)  Slim Fast Advanced Nutrition (180 Calories, 20 g protein)  Muscle Milk, lactose-free, 17 oz bottle (210 Calories, 30 g protein)  Integrated Supplements, no artificial sugars (110 Calories, 20 g 
protein)  Genepro, unflavored protein powder (60 Calories, 30 g protein)  Boost/Ensure Max (160 calories, 30 gm protein)   Fairlife Core Power (170 calories, 26 gm protein)    Meal Replacement Bar Options:  [facility] Protein Shake (160 Calories, 15 g protein)  Quest Protein Bars (190 Calories, 20 g protein)  Built Bar (170 Calories, 15-20 g protein)  One Protein Bar (210 calories, 20 g protein)  [employer] Signature Protein Bar ([employer]) (190 Calories, 21 g protein)  Pure Protein Bars (180 Calories, 21 g protein)      Protein Sources for Weight Loss  [email or web address]     Carbohydrates  [email or web address]     Mindful Eating  [email or web address]     Summary of Volumetrics Eating Plan  [email or web address]     Follow-Up:  1 month, PRN    Time spent with patient: 43 minutes.  Blossom Caal RD, LD            Again, thank you for allowing me to participate in the care of your patient.      Sincerely,    Blossom Caal RD      
AMS 2/2 Acute on Chronic Pancreatitis

## 2022-01-18 NOTE — CONSULT NOTE ADULT - SUBJECTIVE AND OBJECTIVE BOX
INFECTIOUS DISEASES AND INTERNAL MEDICINE at [place]  =======================================================  Fernando Recinos MD  Diplomates American Board of Internal Medicine and Infectious Diseases  Telephone [phone]  Fax            [phone]  =======================================================    ERIK [id number]      HPI:  71F with PMHX IDDM, HTN, HLD, Chronic LBP, GERD, Chronic Pancreatitis c/b Pancreatic Insuffiency BIBEMS to [facility] ER c/o AMS, abdominal pain, and multiple episodes of nausea/vomiting.    . Patient was Febrile in ED Tmax 102.8F in Triage. Given Ofirmev with subsequent improvement in AMS likely fever-mediated. CTAP and Labs indicative of recurrent Pancreatitis. Hx 2 prior similar episodes requiring admission in the past. ROS +Chronic Nausea/Vomiting at least weekly per patient but currently worse. +Epigastric Abd Pain. +Fever/Chills. ROS negative unless mentioned above. +COVID Vaccinated.   PT WITH ECOLI BACTEREMIA CHOLANGITIS   UNDERWENT ERCP TODAY  PT WITH REPORTED ALLERGY PCN KEFLEX  ASKED TO EVALUATE FROM ID STANDPOINT          PAST MEDICAL & SURGICAL HISTORY:  Diabetes    HTN (hypertension)    Endogenous hyperlipemia    Herniated lumbar intervertebral disc    Pancreatitis    S/P appendectomy    S/P cholecystectomy    S/P tonsillectomy    Status post cataract extraction  left eye 8/3/2015        ANTIBIOTICS  MERREM      Allergies    Keflex (Anaphylaxis)  penicillin (Anaphylaxis)    Intolerances        SOCIAL HISTORY:     FAMILY HX   FAMILY HISTORY:  Family history of CHF (congestive heart failure)        Vital Signs Last 24 Hrs  T(C): 37.1 (2022 16:40), Max: 37.8 (2022 04:35)  T(F): 98.7 (2022 16:40), Max: 100 (2022 04:35)  HR: 78 (2022 16:40) (78 - 96)  BP: 177/78 (2022 16:40) (105/51 - 177/78)  BP(mean): --  RR: 18 (2022 16:40) (18 - 18)  SpO2: 99% (2022 16:40) (93% - 99%)  Drug Dosing Weight  Height (cm): 154.9 (2022 20:11)      REVIEW OF SYSTEMS:    CONSTITUTIONAL:  As per HPI.    HEENT:  Eyes:  No diplopia or blurred vision. ENT:  No earache, sore throat or runny nose.    CARDIOVASCULAR:  No pressure, squeezing, strangling, tightness, heaviness or aching about the chest, neck, axilla or epigastrium.    RESPIRATORY:  No cough, shortness of breath, PND or orthopnea.    GASTROINTESTINAL:  vomiting  ABD PAIN    GENITOURINARY:  No dysuria, frequency or urgency.    MUSCULOSKELETAL:  As per HPI.    SKIN:  No change in skin, hair or nails.    NEUROLOGIC:  No paresthesias, fasciculations, seizures or weakness.                  PHYSICAL EXAMINATION:    GENERAL: The patient is a _____in no apparent distress. ___     VITAL SIGNS: T(C): 37.1 (22 @ 16:40), Max: 37.8 (22 @ 04:35)  HR: 78 (22 @ 16:40) (78 - 96)  BP: 177/78 (22 @ 16:40) (105/51 - 177/78)  RR: 18 (22 @ 16:40) (18 - 18)  SpO2: 99% (22 @ 16:40) (93% - 99%)  Wt(kg): --    HEENT: Head is normocephalic and atraumatic.  ANICTERIC  NECK: Supple. No carotid bruits.  No lymphadenopathy or thyromegaly.    LUNGS:COARSE BREATH SOUNDS    HEART: Regular rate and rhythm without murmur.    ABDOMEN: Soft, MILD ABD PAIN.  Positive bowel sounds.  No hepatosplenomegaly was noted. NO REBOUND NO GUARDING    EXTREMITIES: NO EDEMA NO ERYTHEMA    NEUROLOGIC: NON FOCAL      SKIN: No ulceration or induration present. NO RASH        BLOOD CULTURES  Culture Results:   No growth ( @ 02:24)  Culture Results:   Growth in aerobic and anaerobic bottles: Escherichia coli ( @ 20:33)  Culture Results:   Growth in aerobic bottle: Escherichia coli  See previous culture [id number] ( @ 20:32)       URINE CX          LABS:                        8.2    8.66  )-----------( 131      ( 2022 06:36 )             25.4         138  |  103  |  20.6<H>  ----------------------------<  79  3.8   |  24.0  |  0.86    Ca    8.8      2022 06:36  Phos  2.9       Mg     1.9         TPro  5.3<L>  /  Alb  2.9<L>  /  TBili  2.5<H>  /  DBili  x   /  AST  172<H>  /  ALT  276<H>  /  AlkPhos  148<H>      PT/INR - ( 2022 03:03 )   PT: 11.9 sec;   INR: 1.03 ratio         PTT - ( 2022 03:03 )  PTT:23.0 sec  Urinalysis Basic - ( 2022 20:38 )    Color: Yellow / Appearance: Clear / S.010 / pH: x  Gluc: x / Ketone: Negative  / Bili: Negative / Urobili: Negative mg/dL   Blood: x / Protein: 15 / Nitrite: Negative   Leuk Esterase: Negative / RBC: 6-10 /HPF / WBC 0-2   Sq Epi: x / Non Sq Epi: Occasional / Bacteria: Occasional        RADIOLOGY & ADDITIONAL STUDIES:      ASSESSMENT/PLAN    71F with PMHX IDDM, HTN, HLD, Chronic LBP, GERD, Chronic Pancreatitis c/b Pancreatic Insuffiency BIBEMS to [facility] ER c/o AMS, abdominal pain, and multiple episodes of nausea/vomiting.    . Patient was Febrile in ED Tmax 102.8F in Triage. Given Ofirmev with subsequent improvement in AMS likely fever-mediated. CTAP and Labs indicative of recurrent Pancreatitis. Hx 2 prior similar episodes requiring admission in the past. ROS +Chronic Nausea/Vomiting at least weekly per patient but currently worse. +Epigastric Abd Pain. +Fever/Chills. ROS negative unless mentioned above. +COVID Vaccinated.   PT WITH ECOLI BACTEREMIA CHOLANGITIS   UNDERWENT ERCP TODAY  PT WITH REPORTED ALLERGY PCN KEFLEX  PT REPORTS SHE ONCE DEVELOPED HEADACHE 25 YEARS AGO FORM KEFLEX AND WAS TOLD NOT  TO TAKE AGAIN AND ALSO TO AVOID PCN PT DENIES   WILL CHANGE AZACTAM START MERREM TO COVER BROADLY INCLUDING ESBL  PT SHOULD TOLERATE MERREM UNCLEAR IF TERRANCE ALLERGIES  WILL FOLLOWUP  WITH FURTHER RECOMMENDATIONS               AVRIL JAMES MD
HPI:  71F with PMHX IDDM, HTN, HLD, Chronic LBP, GERD, Pancreatitis BIBEMS to [facility] ER c/o AMS, abdominal pain, and multiple episodes of nausea/vomiting. Reports she has had 2 prior episodes of pancreatitis, first episode about 7 years ago, second episode about 3 years ago.  Had cholecystectomy 20 years ago. Febrile in the ER up to 105.  No further episodes of vomiting.  Now diarrhea/constipation/blood in stool. Reports that she has been told by her PCP that she has chronic pancreatitis with pancreatic insufficiency and takes Creon for the same    EGD 20 years ago  Colonoscopy never  No family history of pancreatitis, pancreatic malignancy, GI malignancy       (17 Jan 2022 01:47)    Allergies    Keflex (Anaphylaxis)  penicillin (Anaphylaxis)    Intolerances      Home Medications:  ALPRAZolam 0.25 mg oral tablet: 1 tab(s) orally once a day (at bedtime), As Needed (17 Jan 2022 11:04)  Aspirin Enteric Coated 81 mg oral delayed release tablet: 1 tab(s) orally once a day (17 Jan 2022 11:04)  atorvastatin 20 mg oral tablet: 1 tab(s) orally once a day (17 Jan 2022 11:04)  brimonidine 0.2% ophthalmic solution: 1 drop(s) to each affected eye 2 times a day (17 Jan 2022 11:04)  Creon 6000 units oral delayed release capsule: 1 cap(s) orally 3 times a day (17 Jan 2022 11:04)  latanoprost 0.005% ophthalmic solution: 1 drop(s) to each affected eye once a day (in the evening) (17 Jan 2022 11:04)  Levemir 100 units/mL subcutaneous solution: 10 unit(s) subcutaneous once a day (17 Jan 2022 11:04)  metFORMIN 500 mg oral tablet: 1 tab(s) orally once a day (17 Jan 2022 11:04)  oxyCODONE 5 mg oral tablet: 1 tab(s) orally 4 times a day, As Needed (17 Jan 2022 11:04)  ramipril 5 mg oral capsule: 1 cap(s) orally once a day (17 Jan 2022 11:04)  Restasis 0.05% ophthalmic emulsion: 1 drop(s) to each affected eye every 12 hours (17 Jan 2022 11:04)  Toprol-XL: 75 milligram(s) orally once a day (17 Jan 2022 11:04)    MEDICATIONS:  MEDICATIONS  (STANDING):  aspirin enteric coated 81 milliGRAM(s) Oral daily  atorvastatin 20 milliGRAM(s) Oral at bedtime  aztreonam  IVPB 2000 milliGRAM(s) IV Intermittent every 8 hours  brimonidine 0.2% Ophthalmic Solution 1 Drop(s) Both EYES two times a day  dextrose 40% Gel 15 Gram(s) Oral once  dextrose 5%. 1000 milliLiter(s) (50 mL/Hr) IV Continuous <Continuous>  dextrose 5%. 1000 milliLiter(s) (100 mL/Hr) IV Continuous <Continuous>  dextrose 50% Injectable 25 Gram(s) IV Push once  dextrose 50% Injectable 12.5 Gram(s) IV Push once  dextrose 50% Injectable 25 Gram(s) IV Push once  enoxaparin Injectable 40 milliGRAM(s) SubCutaneous daily  glucagon  Injectable 1 milliGRAM(s) IntraMuscular once  insulin detemir injectable (LEVEMIR) 10 Unit(s) SubCutaneous at bedtime  insulin lispro (ADMELOG) corrective regimen sliding scale   SubCutaneous every 6 hours  lactated ringers. 1000 milliLiter(s) (200 mL/Hr) IV Continuous <Continuous>  latanoprost 0.005% Ophthalmic Solution 1 Drop(s) Both EYES at bedtime  lisinopril 20 milliGRAM(s) Oral daily  magnesium sulfate  IVPB 2 Gram(s) IV Intermittent once  metoprolol succinate ER 75 milliGRAM(s) Oral daily  pantoprazole    Tablet 40 milliGRAM(s) Oral two times a day    MEDICATIONS  (PRN):  acetaminophen     Tablet .. 650 milliGRAM(s) Oral every 6 hours PRN Temp greater or equal to 38C (100.4F), Mild Pain (1 - 3)  ALPRAZolam 0.25 milliGRAM(s) Oral at bedtime PRN Anxiety  aluminum hydroxide/magnesium hydroxide/simethicone Suspension 30 milliLiter(s) Oral every 4 hours PRN Dyspepsia  melatonin 3 milliGRAM(s) Oral at bedtime PRN Insomnia  morphine  - Injectable 2 milliGRAM(s) IV Push every 4 hours PRN Moderate Pain (4 - 6)  morphine  - Injectable 4 milliGRAM(s) IV Push every 4 hours PRN Severe Pain (7 - 10)  ondansetron Injectable 4 milliGRAM(s) IV Push every 8 hours PRN Nausea and/or Vomiting    PAST MEDICAL & SURGICAL HISTORY:  Diabetes    HTN (hypertension)    Endogenous hyperlipemia    Herniated lumbar intervertebral disc    Pancreatitis    S/P appendectomy    S/P cholecystectomy    S/P tonsillectomy    Status post cataract extraction  left eye 8/3/2015      FAMILY HISTORY:  Family history of CHF (congestive heart failure)      SOCIAL HISTORY:  [place]: [ ] Current, [ ] Former, [ ] Never; Pack Years:  Alcohol:  Illicit Drugs:    REVIEW OF SYSTEMS:  CONSTITUTIONAL: No  chills  HEENT: No visual changes; No vertigo or throat pain   NECK: No pain or stiffness  RESPIRATORY: No cough, wheezing, hemoptysis; No shortness of breath  CARDIOVASCULAR: No chest pain or palpitations  GASTROINTESTINAL:  No diarrhea or constipation. No melena or hematochezia.  GENITOURINARY: No dysuria, frequency or hematuria  NEUROLOGICAL: No numbness or weakness  SKIN: No itching, burning, rashes, or lesions   All other 10 review of systems is negative unless indicated above.    Vital Signs Last 24 Hrs  T(C): 37.3 (17 Jan 2022 14:09), Max: 40.4 (16 Jan 2022 20:15)  T(F): 99.2 (17 Jan 2022 14:09), Max: 104.7 (16 Jan 2022 20:15)  HR: 90 (17 Jan 2022 14:09) (81 - 113)  BP: 126/69 (17 Jan 2022 14:09) (96/61 - 154/63)  BP(mean): --  RR: 17 (17 Jan 2022 14:09) (17 - 18)  SpO2: 100% (17 Jan 2022 14:09) (87% - 100%)      PHYSICAL EXAM:    General:  no acute distress  Eyes: Anicteric sclerae, moist conjunctivae  HENT: Moist mucous membranes  Neck: Trachea midline, supple  Lungs: Normal respiratory effors and no intercostal retractions  Cardiovascular: RRR  Abdomen: Soft, non-tender non-distended; Normal bowel sounds; No rebound or guarding  Extremities: Normal range of motion, No clubbing, cyanosis or edema  Neurological: Alert and oriented x3  Skin: Warm and dry. No obvious rash    LABS:                        9.6    21.79 )-----------( 188      ( 17 Jan 2022 03:03 )             29.2     01-17    138  |  102  |  33.6<H>  ----------------------------<  269<H>  4.0   |  22.0  |  1.08    Ca    8.7      17 Jan 2022 03:03  Phos  2.3     01-17  Mg     1.5     01-17    TPro  6.0<L>  /  Alb  3.2<L>  /  TBili  1.6  /  DBili  x   /  AST  505<H>  /  ALT  476<H>  /  AlkPhos  173<H>  01-17        PT/INR - ( 17 Jan 2022 03:03 )   PT: 11.9 sec;   INR: 1.03 ratio         PTT - ( 17 Jan 2022 03:03 )  PTT:23.0 sec    Culture - Blood (collected 16 Jan 2022 20:33)  Source: .Blood Blood-Peripheral  Gram Stain (17 Jan 2022 12:02):    Growth in aerobic and anaerobic bottles: Gram Negative Rods    ***Blood Panel PCR results on this specimen are available    approximately 3 hours after the Gram stain result.***    Gram stain, PCR, and/or culture results may not always    correspond due todifference in methodologies.    ************************************************************    This PCR assay was performed using [employer].    The following targets are tested for: Enterococcus,    vancomycin resistant enterococci, Listeria monocytogenes,    coagulase negative staphylococci, S. aureus,    methicillin resistant S. aureus, Streptococcus agalactiae    (Group B), S. pneumoniae, S. pyogenes (Group A),    Acinetobacter baumannii, Enterobacter cloacae, E. coli,    Klebsiella oxytoca, K. pneumoniae, Proteus sp.,    Serratia marcescens, Haemophilus influenzae,    Neisseria meningitidis, Pseudomonas aeruginosa, Candida    albicans, C. glabrata, C krusei, C parapsilosis,    C. tropicalis and the KPC resistance gene.    "Due to technical problems, Pseudomonas aeruginosa    until further notice".    Gram Stain and BCID performed by:    [facility]    [address] [place] [id number]    .    TYPE: (C=Critical, N=Notification, A=Abnormal) C    TESTS:  _ GS    DATE/TIME CALLED: _ 01/17/2022 11:12:20    CALLED TO: Isela Dsouza RN    READ BACK (2 Patient Identifiers)(Y/N): _ Y    READ BACK VALUES (Y/N): _ Y    CALLED BY: Isela Lee  Organism: Blood Culture PCR (17 Jan 2022 11:13)  Organism: Blood Culture PCR (17 Jan 2022 11:11)    Culture - Blood (collected 16 Jan 2022 20:32)  Source: .Blood Blood-Peripheral  Gram Stain (17 Jan 2022 11:15):    Growth in aerobic bottle: Gram Negative Rods    Gram Stain performed by:    [facility]    [address] [place] [id number]    .    TYPE: (C=Critical, N=Notification, A=Abnormal) C    TESTS:  _ GS    DATE/TIME CALLED: _ 01/17/2022 11:14:21    CALLED TO: Isela Dsouza RN    READ BACK (2 Patient Identifiers)(Y/N): _ Y    READ BACK VALUES (Y/N): _ Y    CALLED BY: Isela Lee      RADIOLOGY & ADDITIONAL STUDIES:

## 2022-01-18 NOTE — PROGRESS NOTE ADULT - PSYCHIATRIC
A Healthy Lifestyle: Care Instructions  Your Care Instructions    A healthy lifestyle can help you feel good, stay at a healthy weight, and have plenty of energy for both work and play. A healthy lifestyle is something you can share with your whole family. A healthy lifestyle also can lower your risk for serious health problems, such as high blood pressure, heart disease, and diabetes. You can follow a few steps listed below to improve your health and the health of your family. Follow-up care is a key part of your treatment and safety. Be sure to make and go to all appointments, and call your doctor if you are having problems. It's also a good idea to know your test results and keep a list of the medicines you take. How can you care for yourself at home? · Do not eat too much sugar, fat, or fast foods. You can still have dessert and treats now and then. The goal is moderation. · Start small to improve your eating habits. Pay attention to portion sizes, drink less juice and soda pop, and eat more fruits and vegetables. ? Eat a healthy amount of food. A 3-ounce serving of meat, for example, is about the size of a deck of cards. Fill the rest of your plate with vegetables and whole grains. ? Limit the amount of soda and sports drinks you have every day. Drink more water when you are thirsty. ? Eat at least 5 servings of fruits and vegetables every day. It may seem like a lot, but it is not hard to reach this goal. A serving or helping is 1 piece of fruit, 1 cup of vegetables, or 2 cups of leafy, raw vegetables. Have an apple or some carrot sticks as an afternoon snack instead of a candy bar. Try to have fruits and/or vegetables at every meal.  · Make exercise part of your daily routine. You may want to start with simple activities, such as walking, bicycling, or slow swimming. Try to be active 30 to 60 minutes every day. You do not need to do all 30 to 60 minutes all at once.  For example, you can exercise 3 times a day for 10 or 20 minutes. Moderate exercise is safe for most people, but it is always a good idea to talk to your doctor before starting an exercise program.  · Keep moving. [place] the lawn, work in the garden, or [place]. Take the stairs instead of the elevator at work. · If you smoke, quit. People who smoke have an increased risk for heart attack, stroke, cancer, and other lung illnesses. Quitting is hard, but there are ways to boost your chance of quitting tobacco for good. ? Use nicotine gum, patches, or lozenges. ? Ask your doctor about stop-smoking programs and medicines. ? Keep trying. In addition to reducing your risk of diseases in the future, you will notice some benefits soon after you stop using tobacco. If you have shortness of breath or asthma symptoms, they will likely get better within a few weeks after you quit. · Limit how much alcohol you drink. Moderate amounts of alcohol (up to 2 drinks a day for men, 1 drink a day for women) are okay. But drinking too much can lead to liver problems, high blood pressure, and other health problems. Family health  If you have a family, there are many things you can do together to improve your health. · Eat meals together as a family as often as possible. · Eat healthy foods. This includes fruits, vegetables, lean meats and dairy, and whole grains. · Include your family in your fitness plan. Most people think of activities such as jogging or tennis as the way to fitness, but there are many ways you and your family can be more active. Anything that makes you breathe hard and gets your heart pumping is exercise. Here are some tips:  ? Walk to do errands or to take your child to school or the bus.  ? Go for a family bike ride after dinner instead of watching TV. Where can you learn more? Go to http://hodan-eugene.info/. Enter Q941 in the search box to learn more about \"A Healthy Lifestyle: Care Instructions. \"  Current as of: December 7, 2017  Content Version: 11.8  © 8303-0018 Healthwise, Incorporated. Care instructions adapted under license by [employer] (which disclaims liability or warranty for this information). If you have questions about a medical condition or this instruction, always ask your healthcare professional. [address] any warranty or liability for your use of this information. negative

## 2022-01-18 NOTE — PROGRESS NOTE ADULT - ASSESSMENT
71 yr old female with hypertension, hyperlipidemia, chronic lower back pain, chronic pancreatitis with pancreatic insufficiency, GERD presented with complaints of abdominal pain, nausea, vomiting, altered mental status. Patient noted to be febrile, confused, labs revealed elevated lipase, WBC and LFTs. CT abdomen was done revealed acute pancreatitis without necrosis. She was given IV Tylenol, fluids and pain control. Cultures were sent. Her blood cultures were positive for E coli, Azactam was added. Patient was noted to have elevated LFTs, GI evaluation was requested, for possible MRCP.    1. severe sepsis sec E coli bacteremia and possible cholangitis:  NPO at this time, attempt clears.  GI evaluation appreciated.  Possible ERCP today.  Continue Azactam  Repeat blood cultures ordered.    3. Metabolic encephalopathy:  Present on admission, now resolved  likely from sepsis.    3. SIRS sec acute pancreatitis:  Improving  NPO for now  Lipase and Amylase improved.    4. Diabetes mellitus:  Continue Lantus, monitor FS, sliding scale coverage.  Will adjust Lantus pending results of above.    5. Transaminitis:  improving  likely from sludge in CBD  US abdomen ordered.    6. Hypertension:  Continue Lisinopril and Metoprolol.    7. DVT ppx:  Hold Lovenox, possible ERCP today.      Discussed with patient and RN.     71 yr old female with hypertension, hyperlipidemia, chronic lower back pain, chronic pancreatitis with pancreatic insufficiency, GERD presented with complaints of abdominal pain, nausea, vomiting, altered mental status. Patient noted to be febrile, confused, labs revealed elevated lipase, WBC and LFTs. CT abdomen was done revealed acute pancreatitis without necrosis. She was given IV Tylenol, fluids and pain control. Cultures were sent. Her blood cultures were positive for E coli, Azactam was added. Patient was noted to have elevated LFTs, GI evaluation was requested, for possible MRCP.    1. severe sepsis sec E coli bacteremia and possible cholangitis:  NPO at this time, attempt clears.  GI evaluation appreciated.  Possible ERCP today.  Continue Azactam  Repeat blood cultures ordered.    3. Metabolic encephalopathy:  Present on admission, now resolved  likely from sepsis.    3. SIRS sec acute pancreatitis:  Improving  NPO for now  Lipase and Amylase improved.    4. Diabetes mellitus:  Continue Lantus, monitor FS, sliding scale coverage.  Will adjust Lantus pending results of above.    5. Transaminitis:  improving  likely from sludge in CBD  US abdomen ordered.    6. Hypertension:  Continue Lisinopril and Metoprolol.    7. DVT ppx:  Hold Lovenox, possible ERCP today.      Discussed with patient and RN.  Updated  Niko.

## 2022-01-19 LAB
-  AMIKACIN: SIGNIFICANT CHANGE UP
-  AMPICILLIN/SULBACTAM: SIGNIFICANT CHANGE UP
-  AMPICILLIN: SIGNIFICANT CHANGE UP
-  AZTREONAM: SIGNIFICANT CHANGE UP
-  CEFAZOLIN: SIGNIFICANT CHANGE UP
-  CEFEPIME: SIGNIFICANT CHANGE UP
-  CEFOXITIN: SIGNIFICANT CHANGE UP
-  CEFTRIAXONE: SIGNIFICANT CHANGE UP
-  CIPROFLOXACIN: SIGNIFICANT CHANGE UP
-  ERTAPENEM: SIGNIFICANT CHANGE UP
-  GENTAMICIN: SIGNIFICANT CHANGE UP
-  IMIPENEM: SIGNIFICANT CHANGE UP
-  LEVOFLOXACIN: SIGNIFICANT CHANGE UP
-  MEROPENEM: SIGNIFICANT CHANGE UP
-  PIPERACILLIN/TAZOBACTAM: SIGNIFICANT CHANGE UP
-  TOBRAMYCIN: SIGNIFICANT CHANGE UP
-  TRIMETHOPRIM/SULFAMETHOXAZOLE: SIGNIFICANT CHANGE UP
ALBUMIN SERPL ELPH-MCNC: 2.9 G/DL — LOW (ref 3.3–5.2)
ALP SERPL-CCNC: 190 U/L — HIGH (ref 40–120)
ALT FLD-CCNC: 192 U/L — HIGH
AMYLASE P1 CFR SERPL: 51 U/L — SIGNIFICANT CHANGE UP (ref 36–128)
ANION GAP SERPL CALC-SCNC: 15 MMOL/L — SIGNIFICANT CHANGE UP (ref 5–17)
AST SERPL-CCNC: 90 U/L — HIGH
BILIRUB SERPL-MCNC: 2.3 MG/DL — HIGH (ref 0.4–2)
BUN SERPL-MCNC: 24.2 MG/DL — HIGH (ref 8–20)
CALCIUM SERPL-MCNC: 9 MG/DL — SIGNIFICANT CHANGE UP (ref 8.6–10.2)
CHLORIDE SERPL-SCNC: 102 MMOL/L — SIGNIFICANT CHANGE UP (ref 98–107)
CO2 SERPL-SCNC: 22 MMOL/L — SIGNIFICANT CHANGE UP (ref 22–29)
CREAT SERPL-MCNC: 0.9 MG/DL — SIGNIFICANT CHANGE UP (ref 0.5–1.3)
CULTURE RESULTS: SIGNIFICANT CHANGE UP
CULTURE RESULTS: SIGNIFICANT CHANGE UP
GLUCOSE BLDC GLUCOMTR-MCNC: 116 MG/DL — HIGH (ref 70–99)
GLUCOSE BLDC GLUCOMTR-MCNC: 118 MG/DL — HIGH (ref 70–99)
GLUCOSE BLDC GLUCOMTR-MCNC: 119 MG/DL — HIGH (ref 70–99)
GLUCOSE BLDC GLUCOMTR-MCNC: 123 MG/DL — HIGH (ref 70–99)
GLUCOSE BLDC GLUCOMTR-MCNC: 136 MG/DL — HIGH (ref 70–99)
GLUCOSE SERPL-MCNC: 118 MG/DL — HIGH (ref 70–99)
HCT VFR BLD CALC: 28.4 % — LOW (ref 34.5–45)
HGB BLD-MCNC: 9.7 G/DL — LOW (ref 11.5–15.5)
LIDOCAIN IGE QN: 25 U/L — SIGNIFICANT CHANGE UP (ref 22–51)
MAGNESIUM SERPL-MCNC: 1.6 MG/DL — SIGNIFICANT CHANGE UP (ref 1.6–2.6)
MCHC RBC-ENTMCNC: 30.4 PG — SIGNIFICANT CHANGE UP (ref 27–34)
MCHC RBC-ENTMCNC: 34.2 GM/DL — SIGNIFICANT CHANGE UP (ref 32–36)
MCV RBC AUTO: 89 FL — SIGNIFICANT CHANGE UP (ref 80–100)
METHOD TYPE: SIGNIFICANT CHANGE UP
ORGANISM # SPEC MICROSCOPIC CNT: SIGNIFICANT CHANGE UP
PHOSPHATE SERPL-MCNC: 4.4 MG/DL — SIGNIFICANT CHANGE UP (ref 2.4–4.7)
PLATELET # BLD AUTO: 163 K/UL — SIGNIFICANT CHANGE UP (ref 150–400)
POTASSIUM SERPL-MCNC: 4.5 MMOL/L — SIGNIFICANT CHANGE UP (ref 3.5–5.3)
POTASSIUM SERPL-SCNC: 4.5 MMOL/L — SIGNIFICANT CHANGE UP (ref 3.5–5.3)
PROT SERPL-MCNC: 5.6 G/DL — LOW (ref 6.6–8.7)
RBC # BLD: 3.19 M/UL — LOW (ref 3.8–5.2)
RBC # FLD: 11.9 % — SIGNIFICANT CHANGE UP (ref 10.3–14.5)
SODIUM SERPL-SCNC: 139 MMOL/L — SIGNIFICANT CHANGE UP (ref 135–145)
SPECIMEN SOURCE: SIGNIFICANT CHANGE UP
SPECIMEN SOURCE: SIGNIFICANT CHANGE UP
WBC # BLD: 9.15 K/UL — SIGNIFICANT CHANGE UP (ref 3.8–10.5)
WBC # FLD AUTO: 9.15 K/UL — SIGNIFICANT CHANGE UP (ref 3.8–10.5)

## 2022-01-19 PROCEDURE — 76705 ECHO EXAM OF ABDOMEN: CPT | Mod: 26,RT

## 2022-01-19 PROCEDURE — 74183 MRI ABD W/O CNTR FLWD CNTR: CPT | Mod: 26

## 2022-01-19 PROCEDURE — 99232 SBSQ HOSP IP/OBS MODERATE 35: CPT

## 2022-01-19 PROCEDURE — 99233 SBSQ HOSP IP/OBS HIGH 50: CPT

## 2022-01-19 RX ORDER — ENOXAPARIN SODIUM 100 MG/ML
40 INJECTION SUBCUTANEOUS DAILY
Refills: 0 | Status: DISCONTINUED | OUTPATIENT
Start: 2022-01-19 | End: 2022-01-23

## 2022-01-19 RX ADMIN — BRIMONIDINE TARTRATE 1 DROP(S): 2 SOLUTION/ DROPS OPHTHALMIC at 18:59

## 2022-01-19 RX ADMIN — PANTOPRAZOLE SODIUM 40 MILLIGRAM(S): 20 TABLET, DELAYED RELEASE ORAL at 05:33

## 2022-01-19 RX ADMIN — PANTOPRAZOLE SODIUM 40 MILLIGRAM(S): 20 TABLET, DELAYED RELEASE ORAL at 19:05

## 2022-01-19 RX ADMIN — MORPHINE SULFATE 4 MILLIGRAM(S): 50 CAPSULE, EXTENDED RELEASE ORAL at 12:43

## 2022-01-19 RX ADMIN — LISINOPRIL 20 MILLIGRAM(S): 2.5 TABLET ORAL at 05:33

## 2022-01-19 RX ADMIN — Medication 81 MILLIGRAM(S): at 12:43

## 2022-01-19 RX ADMIN — Medication 10 UNIT(S): at 23:22

## 2022-01-19 RX ADMIN — MEROPENEM 100 MILLIGRAM(S): 1 INJECTION INTRAVENOUS at 23:23

## 2022-01-19 RX ADMIN — BRIMONIDINE TARTRATE 1 DROP(S): 2 SOLUTION/ DROPS OPHTHALMIC at 06:31

## 2022-01-19 RX ADMIN — ATORVASTATIN CALCIUM 20 MILLIGRAM(S): 80 TABLET, FILM COATED ORAL at 23:22

## 2022-01-19 RX ADMIN — MORPHINE SULFATE 4 MILLIGRAM(S): 50 CAPSULE, EXTENDED RELEASE ORAL at 05:45

## 2022-01-19 RX ADMIN — LATANOPROST 1 DROP(S): 0.05 SOLUTION/ DROPS OPHTHALMIC; TOPICAL at 23:19

## 2022-01-19 RX ADMIN — MORPHINE SULFATE 4 MILLIGRAM(S): 50 CAPSULE, EXTENDED RELEASE ORAL at 23:38

## 2022-01-19 RX ADMIN — MEROPENEM 100 MILLIGRAM(S): 1 INJECTION INTRAVENOUS at 05:30

## 2022-01-19 RX ADMIN — MORPHINE SULFATE 4 MILLIGRAM(S): 50 CAPSULE, EXTENDED RELEASE ORAL at 19:45

## 2022-01-19 RX ADMIN — MORPHINE SULFATE 4 MILLIGRAM(S): 50 CAPSULE, EXTENDED RELEASE ORAL at 19:05

## 2022-01-19 RX ADMIN — MORPHINE SULFATE 4 MILLIGRAM(S): 50 CAPSULE, EXTENDED RELEASE ORAL at 05:30

## 2022-01-19 RX ADMIN — Medication 75 MILLIGRAM(S): at 05:33

## 2022-01-19 RX ADMIN — MEROPENEM 100 MILLIGRAM(S): 1 INJECTION INTRAVENOUS at 12:44

## 2022-01-19 RX ADMIN — MORPHINE SULFATE 2 MILLIGRAM(S): 50 CAPSULE, EXTENDED RELEASE ORAL at 12:31

## 2022-01-19 RX ADMIN — MORPHINE SULFATE 2 MILLIGRAM(S): 50 CAPSULE, EXTENDED RELEASE ORAL at 08:44

## 2022-01-19 RX ADMIN — MORPHINE SULFATE 4 MILLIGRAM(S): 50 CAPSULE, EXTENDED RELEASE ORAL at 23:23

## 2022-01-19 RX ADMIN — Medication 3 MILLIGRAM(S): at 01:20

## 2022-01-19 RX ADMIN — MORPHINE SULFATE 4 MILLIGRAM(S): 50 CAPSULE, EXTENDED RELEASE ORAL at 02:05

## 2022-01-19 RX ADMIN — MORPHINE SULFATE 4 MILLIGRAM(S): 50 CAPSULE, EXTENDED RELEASE ORAL at 01:20

## 2022-01-19 RX ADMIN — MORPHINE SULFATE 4 MILLIGRAM(S): 50 CAPSULE, EXTENDED RELEASE ORAL at 13:47

## 2022-01-19 NOTE — PHYSICAL THERAPY INITIAL EVALUATION ADULT - PHYSICAL ASSIST/NONPHYSICAL ASSIST: GAIT, REHAB EVAL
required physical assistance to help regain LOB during ambulation x 1 incident, as pt was turning around to start walking after donning gown on. t/o ambulation pt demonstrated intermittent wall cruising

## 2022-01-19 NOTE — PROGRESS NOTE ADULT - SUBJECTIVE AND OBJECTIVE BOX
HPI  Pt is a 72yo F admitted to [facility] for cholengitis   Pt was seen and examined after MRI. Pt states she still have epigastric pain, however is much improved today. Denies of any N/V.     Vital Signs Last 24 Hrs  T(C): 36.8 (19 Jan 2022 12:08), Max: 37.1 (18 Jan 2022 16:40)  T(F): 98.2 (19 Jan 2022 12:08), Max: 98.7 (18 Jan 2022 16:40)  HR: 74 (19 Jan 2022 12:08) (67 - 78)  BP: 132/64 (19 Jan 2022 12:08) (96/45 - 177/78)  BP(mean): --  RR: 18 (19 Jan 2022 12:08) (18 - 18)  SpO2: 96% (19 Jan 2022 12:08) (91% - 99%)    I&O's Summary    18 Jan 2022 07:01  -  19 Jan 2022 07:00  --------------------------------------------------------  IN: 0 mL / OUT: 1350 mL / NET: -1350 mL    19 Jan 2022 07:01  -  19 Jan 2022 14:00  --------------------------------------------------------  IN: 0 mL / OUT: 200 mL / NET: -200 mL        CAPILLARY BLOOD GLUCOSE      POCT Blood Glucose.: 119 mg/dL (19 Jan 2022 12:10)  POCT Blood Glucose.: 116 mg/dL (19 Jan 2022 08:33)  POCT Blood Glucose.: 123 mg/dL (19 Jan 2022 06:29)  POCT Blood Glucose.: 119 mg/dL (18 Jan 2022 23:15)  POCT Blood Glucose.: 110 mg/dL (18 Jan 2022 17:12)      PHYSICAL EXAM:    Constitutional: NAD,    HEENT: PERR, EOMI, Normal Hearing, MMM  Neck: Soft and supple, No LAD, No JVD  Respiratory: Breath sounds are clear bilaterally,   Cardiovascular: S1 and S2,    Gastrointestinal: Bowel Sounds present, soft, epigastric pain on palpation   Extremities: No peripheral edema  Vascular: 2+ peripheral pulses  Neurological: A/O x 3, no focal deficits  Musculoskeletal: 5/5 strength b/l upper and lower extremities  Skin: No rashes    MEDICATIONS:  MEDICATIONS  (STANDING):  aspirin enteric coated 81 milliGRAM(s) Oral daily  atorvastatin 20 milliGRAM(s) Oral at bedtime  brimonidine 0.2% Ophthalmic Solution 1 Drop(s) Both EYES two times a day  dextrose 40% Gel 15 Gram(s) Oral once  dextrose 5%. 1000 milliLiter(s) (50 mL/Hr) IV Continuous <Continuous>  dextrose 5%. 1000 milliLiter(s) (100 mL/Hr) IV Continuous <Continuous>  dextrose 50% Injectable 25 Gram(s) IV Push once  dextrose 50% Injectable 12.5 Gram(s) IV Push once  dextrose 50% Injectable 25 Gram(s) IV Push once  glucagon  Injectable 1 milliGRAM(s) IntraMuscular once  indomethacin Suppository 100 milliGRAM(s) Rectal once  insulin detemir injectable (LEVEMIR) 10 Unit(s) SubCutaneous at bedtime  insulin lispro (ADMELOG) corrective regimen sliding scale   SubCutaneous every 6 hours  lactated ringers. 1000 milliLiter(s) (200 mL/Hr) IV Continuous <Continuous>  latanoprost 0.005% Ophthalmic Solution 1 Drop(s) Both EYES at bedtime  lisinopril 20 milliGRAM(s) Oral daily  meropenem  IVPB 1000 milliGRAM(s) IV Intermittent every 8 hours  metoprolol succinate ER 75 milliGRAM(s) Oral daily  pantoprazole    Tablet 40 milliGRAM(s) Oral two times a day      LABS: All Labs Reviewed:                        9.7    9.15  )-----------( 163      ( 19 Jan 2022 06:00 )             28.4     01-19    139  |  102  |  24.2<H>  ----------------------------<  118<H>  4.5   |  22.0  |  0.90    Ca    9.0      19 Jan 2022 06:00  Phos  4.4     01-19  Mg     1.6     01-19    TPro  5.6<L>  /  Alb  2.9<L>  /  TBili  2.3<H>  /  DBili  x   /  AST  90<H>  /  ALT  192<H>  /  AlkPhos  190<H>  01-19          Blood Culture: 01-17 @ 02:24  Organism --  Gram Stain Blood -- Gram Stain --  Specimen Source Clean Catch Clean Catch (Midstream)  Culture-Blood --    01-16 @ 20:33  Organism Blood Culture PCR  Gram Stain Blood -- Gram Stain   Growth in aerobic and anaerobic bottles: Gram Negative Rods  ***Blood Panel PCR results on this specimen are available  approximately 3 hours after the Gram stain result.***  Gram stain, PCR, and/or culture results may not always  correspond due todifference in methodologies.  ************************************************************  This PCR assay was performed using [employer].  The following targets are tested for: Enterococcus,  vancomycin resistant enterococci, Listeria monocytogenes,  coagulase negative staphylococci, S. aureus,  methicillin resistant S. aureus, Streptococcus agalactiae  (Group B), S. pneumoniae, S. pyogenes (Group A),  Acinetobacter baumannii, Enterobacter cloacae, E. coli,  Klebsiella oxytoca, K. pneumoniae, Proteus sp.,  Serratia marcescens, Haemophilus influenzae,  Neisseria meningitidis, Pseudomonas aeruginosa, Candida  albicans, C. glabrata, C krusei, C parapsilosis,  C. tropicalis and the KPC resistance gene.  "Due to technical problems, Pseudomonas aeruginosa  until further notice".  Gram Stain and BCID performed by:  [facility]  [address] [place] [id number]  .  TYPE: (C=Critical, N=Notification, A=Abnormal) C  TESTS:  _ GS  DATE/TIME CALLED: _ 01/17/2022 11:12:20  CALLED TO: Isela Dsouza RN  READ BACK (2 Patient Identifiers)(Y/N): _ Y  READ BACK VALUES (Y/N): _ Y  CALLED BY: Isela Lee  Specimen Source .Blood Blood-Peripheral  Culture-Blood --    01-16 @ 20:32  Organism --  Gram Stain Blood -- Gram Stain   Growth in aerobic bottle: Gram Negative Rods  Gram Stain performed by:  [facility]  [address] [place] [address]  .  TYPE: (C=Critical, N=Notification, A=Abnormal) C  TESTS:  _ GS  DATE/TIME CALLED: _ 01/17/2022 11:14:21  CALLED TO: Isela Dsouza RN  READ BACK (2 Patient Identifiers)(Y/N): _ Y  READ BACK VALUES (Y/N): _ Y  CALLED BY: _ Rosa  Specimen Source .Blood Blood-Peripheral  Culture-Blood --        RADIOLOGY/EKG:    DVT PPX:    ADVANCED DIRECTIVE:    DISPOSITION:

## 2022-01-19 NOTE — PROGRESS NOTE ADULT - ASSESSMENT
71 yr old female with hypertension, hyperlipidemia, chronic lower back pain, chronic pancreatitis with pancreatic insufficiency, GERD presented with complaints of abdominal pain, nausea, vomiting, altered mental status. Patient noted to be febrile, confused, labs revealed elevated lipase, WBC and LFTs. CT abdomen was done revealed acute pancreatitis without necrosis. She was given IV Tylenol, fluids and pain control. Cultures were sent. Her blood cultures were positive for E coli, Azactam was added. Patient was noted to have elevated LFTs, GI evaluation was requested, for possible MRCP.    *severe sepsis sec E coli bacteremia and cholangitis:  GI evaluation appreciated.  s/p ERCP 1/18  cholengitis with biliary sphinectomy and biliary and pancreatic duct stent placement   f/u GI 4-6w for stent removal   Continue Azactam  Repeat blood cultures pending  Start Clear liquid diet today     *Metabolic encephalopathy:  Present on admission, now resolved  secondary to cholengitits and sepsis.    *Diabetes mellitus:  HgA1C 6.8   BGM controlled   Continue Lantus,   ISS     *Transaminitis:  improving  likely from sludge in CBD  US abdomen pending     *Hypertension:  Continue Lisinopril and Metoprolol.    *DVT ppx:  Restart Lovenox     PT consult   Updated  Niko 1/19

## 2022-01-19 NOTE — PROGRESS NOTE ADULT - ATTENDING COMMENTS
Patient seen and examined.  Still has abdominal pain.  Status post ERCP. Tough biliary cannulation. Bile duct and pancreatic duct stent placement. Continue antibiotics. Start clear liquid diet once abdominal pain is better. Repeat ERCP in 4-6 weeks

## 2022-01-19 NOTE — PHYSICAL THERAPY INITIAL EVALUATION ADULT - GENERAL OBSERVATIONS, REHAB EVAL
Pt received on 2GUL, pt ok for PT by RN Madeline. pt observed semi-vasquez in bed sleeping/able to be woken up, room air, IV lock, pleasant, cooperative, A&O and c/o nausea and abdominal pain t/o eval(unable to quantify).

## 2022-01-19 NOTE — PROGRESS NOTE ADULT - SUBJECTIVE AND OBJECTIVE BOX
Patient is a 71y old  Female who presents with a chief complaint of AMS 2/2 Acute on Chronic Pancreatitis (18 Jan 2022 16:53)      INTERVAL HPI/OVERNIGHT EVENTS: Patient seen and evaluated at bedside, reporting no complaints, no overnight events, verbalizing "some mild sore throat", tolerating liquid intake, s/p EGD/ERCP revealing non bleeding gastritis, ERCP with sphincterotomy performed with purulent material and contrast flow, then successful biliary and pancreatic stent placement. LFT's slowly downtrend. Denies nausea, vomiting, abdominal pain, chest pain, shortness of breath, hematemesis, hematochezia, melena.     MEDICATIONS  (STANDING):  aspirin enteric coated 81 milliGRAM(s) Oral daily  atorvastatin 20 milliGRAM(s) Oral at bedtime  brimonidine 0.2% Ophthalmic Solution 1 Drop(s) Both EYES two times a day  dextrose 40% Gel 15 Gram(s) Oral once  dextrose 5%. 1000 milliLiter(s) (50 mL/Hr) IV Continuous <Continuous>  dextrose 5%. 1000 milliLiter(s) (100 mL/Hr) IV Continuous <Continuous>  dextrose 50% Injectable 12.5 Gram(s) IV Push once  dextrose 50% Injectable 25 Gram(s) IV Push once  dextrose 50% Injectable 25 Gram(s) IV Push once  glucagon  Injectable 1 milliGRAM(s) IntraMuscular once  indomethacin Suppository 100 milliGRAM(s) Rectal once  insulin detemir injectable (LEVEMIR) 10 Unit(s) SubCutaneous at bedtime  insulin lispro (ADMELOG) corrective regimen sliding scale   SubCutaneous every 6 hours  lactated ringers. 1000 milliLiter(s) (200 mL/Hr) IV Continuous <Continuous>  latanoprost 0.005% Ophthalmic Solution 1 Drop(s) Both EYES at bedtime  lisinopril 20 milliGRAM(s) Oral daily  meropenem  IVPB 1000 milliGRAM(s) IV Intermittent every 8 hours  metoprolol succinate ER 75 milliGRAM(s) Oral daily  pantoprazole    Tablet 40 milliGRAM(s) Oral two times a day    MEDICATIONS  (PRN):  acetaminophen     Tablet .. 650 milliGRAM(s) Oral every 6 hours PRN Temp greater or equal to 38C (100.4F), Mild Pain (1 - 3)  ALPRAZolam 0.25 milliGRAM(s) Oral at bedtime PRN Anxiety  aluminum hydroxide/magnesium hydroxide/simethicone Suspension 30 milliLiter(s) Oral every 4 hours PRN Dyspepsia  melatonin 3 milliGRAM(s) Oral at bedtime PRN Insomnia  morphine  - Injectable 2 milliGRAM(s) IV Push every 4 hours PRN Moderate Pain (4 - 6)  morphine  - Injectable 4 milliGRAM(s) IV Push every 4 hours PRN Severe Pain (7 - 10)  ondansetron Injectable 4 milliGRAM(s) IV Push every 8 hours PRN Nausea and/or Vomiting      Allergies    Keflex (Anaphylaxis)  penicillin (Anaphylaxis)    Intolerances  Review of Systems:  · ENMT: negative  · Respiratory and Thorax: negative  · Cardiovascular: negative  · Gastrointestinal: see above.  · Genitourinary:	negative  · Musculoskeletal: negative  · Neurological: negative  · Psychiatric: negative  · Hematology/Lymphatics: negative  · Endocrine: negative    Vital Signs Last 24 Hrs  T(C): 36.7 (19 Jan 2022 08:12), Max: 37.1 (18 Jan 2022 16:40)  T(F): 98.1 (19 Jan 2022 08:12), Max: 98.7 (18 Jan 2022 16:40)  HR: 67 (19 Jan 2022 08:12) (67 - 96)  BP: 96/45 (19 Jan 2022 08:12) (96/45 - 177/78)  BP(mean): --  RR: 18 (19 Jan 2022 08:12) (18 - 18)  SpO2: 91% (19 Jan 2022 08:12) (91% - 99%)    PHYSICAL EXAM:  · Constitutional: Elderly looking woman, in no acute distress.   · Eyes: EOMI; PERRL; no drainage or redness  · ENMT: No oral lesions; no gross abnormalities  · Neck:	No bruits; no thyromegaly or nodules  · Back:	No deformity or limitation of movement  · Respiratory: Breath Sounds equal & clear to percussion & auscultation, no accessory muscle use  · Cardiovascular: Regular rate & rhythm, normal S1, S2; no murmurs, gallops or rubs; no S3, S4  · Gastrointestinal: Soft, non-tender, no hepatosplenomegaly, normal bowel sounds      LABS:                        9.7    9.15  )-----------( 163      ( 19 Jan 2022 06:00 )             28.4     01-19    139  |  102  |  24.2<H>  ----------------------------<  118<H>  4.5   |  22.0  |  0.90    Ca    9.0      19 Jan 2022 06:00  Phos  4.4     01-19  Mg     1.6     01-19    TPro  5.6<L>  /  Alb  2.9<L>  /  TBili  2.3<H>  /  DBili  x   /  AST  90<H>  /  ALT  192<H>  /  AlkPhos  190<H>  01-19        LIVER FUNCTIONS - ( 19 Jan 2022 06:00 )  Alb: 2.9 g/dL / Pro: 5.6 g/dL / ALK PHOS: 190 U/L / ALT: 192 U/L / AST: 90 U/L / GGT: x             RADIOLOGY & ADDITIONAL TESTS:  < from: CT Abdomen and Pelvis w/ IV Cont (01.16.22 @ 21:34) >    ACC: [id number] EXAM:  CT ABDOMEN AND PELVIS IC                          PROCEDURE DATE:  01/16/2022          INTERPRETATION:  CLINICAL INFORMATION: sepsis, elevated LFTs, h/o   pancreatitis    COMPARISON: Comparison to numerous prior examinations most recent on   4/10/2019    CONTRAST/COMPLICATIONS:  IV Contrast: Omnipaque 300  94 cc administered   6 cc discarded  Oral Contrast: NONE  Complications: None reported at time of study completion    PROCEDURE:  CT of the Abdomen and Pelvis was performed.  Sagittal and coronal reformats were performed.    FINDINGS:  LOWER CHEST: Within normal limits.    LIVER: Within normal limits.  BILE DUCTS: Normal caliber.  GALLBLADDER: Cholecystectomy.  SPLEEN: Within normal limits.  PANCREAS: Diffuse pancreatic edema and peripancreatic fluid.  ADRENALS: Within normal limits.  KIDNEYS/URETERS: Within normal limits.    BLADDER: Within normal limits.  REPRODUCTIVE ORGANS: Uterus and adnexa within normal limits.    BOWEL: Small hiatal hernia. No bowel obstruction. Fecal impaction.   Appendix is not visualized. No evidence of inflammation in the pericecal   region.  PERITONEUM: No ascites.  VESSELS: Atherosclerotic changes.  RETROPERITONEUM/LYMPH NODES: No lymphadenopathy.  ABDOMINAL WALL: Within normal limits.  BONES: Degenerative changes.    IMPRESSION:  Acute pancreatitis. No evidence of pancreatic necrosis. No abnormal fluid   collections.      < end of copied text >

## 2022-01-19 NOTE — PHYSICAL THERAPY INITIAL EVALUATION ADULT - PERTINENT HX OF CURRENT PROBLEM, REHAB EVAL
pt was BIBA 2/2 AMS, nausea/vomiting and hypoxia. pt has ERCP performed on 1/18/22. 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 denies hx of falls.

## 2022-01-19 NOTE — PHYSICAL THERAPY INITIAL EVALUATION ADULT - ADDITIONAL COMMENTS
Pt lives with spouse in a private home with 3 YUDI 1 handrail + 3 steps 1 handrail to main level/bed&bath. Pt's PLOF was independent in all ADLs/ambulation without an Assistive Device and (+) working/driving PTA. Pt has no DME at home. At this time, no DME is recommended/required.

## 2022-01-20 DIAGNOSIS — K83.09 OTHER CHOLANGITIS: ICD-10-CM

## 2022-01-20 LAB
ALBUMIN SERPL ELPH-MCNC: 2.9 G/DL — LOW (ref 3.3–5.2)
ALP SERPL-CCNC: 184 U/L — HIGH (ref 40–120)
ALT FLD-CCNC: 138 U/L — HIGH
ANION GAP SERPL CALC-SCNC: 9 MMOL/L — SIGNIFICANT CHANGE UP (ref 5–17)
AST SERPL-CCNC: 51 U/L — HIGH
BILIRUB DIRECT SERPL-MCNC: 0.6 MG/DL — HIGH (ref 0–0.3)
BILIRUB INDIRECT FLD-MCNC: 0.4 MG/DL — SIGNIFICANT CHANGE UP (ref 0.2–1)
BILIRUB SERPL-MCNC: 1 MG/DL — SIGNIFICANT CHANGE UP (ref 0.4–2)
BUN SERPL-MCNC: 33.9 MG/DL — HIGH (ref 8–20)
CALCIUM SERPL-MCNC: 8.9 MG/DL — SIGNIFICANT CHANGE UP (ref 8.6–10.2)
CHLORIDE SERPL-SCNC: 105 MMOL/L — SIGNIFICANT CHANGE UP (ref 98–107)
CO2 SERPL-SCNC: 27 MMOL/L — SIGNIFICANT CHANGE UP (ref 22–29)
CREAT SERPL-MCNC: 0.92 MG/DL — SIGNIFICANT CHANGE UP (ref 0.5–1.3)
GLUCOSE BLDC GLUCOMTR-MCNC: 106 MG/DL — HIGH (ref 70–99)
GLUCOSE BLDC GLUCOMTR-MCNC: 126 MG/DL — HIGH (ref 70–99)
GLUCOSE BLDC GLUCOMTR-MCNC: 85 MG/DL — SIGNIFICANT CHANGE UP (ref 70–99)
GLUCOSE BLDC GLUCOMTR-MCNC: 89 MG/DL — SIGNIFICANT CHANGE UP (ref 70–99)
GLUCOSE SERPL-MCNC: 90 MG/DL — SIGNIFICANT CHANGE UP (ref 70–99)
HCT VFR BLD CALC: 27.5 % — LOW (ref 34.5–45)
HGB BLD-MCNC: 9.3 G/DL — LOW (ref 11.5–15.5)
MCHC RBC-ENTMCNC: 30.6 PG — SIGNIFICANT CHANGE UP (ref 27–34)
MCHC RBC-ENTMCNC: 33.8 GM/DL — SIGNIFICANT CHANGE UP (ref 32–36)
MCV RBC AUTO: 90.5 FL — SIGNIFICANT CHANGE UP (ref 80–100)
PLATELET # BLD AUTO: 190 K/UL — SIGNIFICANT CHANGE UP (ref 150–400)
POTASSIUM SERPL-MCNC: 4 MMOL/L — SIGNIFICANT CHANGE UP (ref 3.5–5.3)
POTASSIUM SERPL-SCNC: 4 MMOL/L — SIGNIFICANT CHANGE UP (ref 3.5–5.3)
PROT SERPL-MCNC: 5.5 G/DL — LOW (ref 6.6–8.7)
RBC # BLD: 3.04 M/UL — LOW (ref 3.8–5.2)
RBC # FLD: 12.5 % — SIGNIFICANT CHANGE UP (ref 10.3–14.5)
SODIUM SERPL-SCNC: 141 MMOL/L — SIGNIFICANT CHANGE UP (ref 135–145)
WBC # BLD: 11.13 K/UL — HIGH (ref 3.8–10.5)
WBC # FLD AUTO: 11.13 K/UL — HIGH (ref 3.8–10.5)

## 2022-01-20 PROCEDURE — 99233 SBSQ HOSP IP/OBS HIGH 50: CPT

## 2022-01-20 RX ORDER — BENZOCAINE AND MENTHOL 5; 1 G/100ML; G/100ML
1 LIQUID ORAL EVERY 6 HOURS
Refills: 0 | Status: DISCONTINUED | OUTPATIENT
Start: 2022-01-20 | End: 2022-01-23

## 2022-01-20 RX ADMIN — SODIUM CHLORIDE 200 MILLILITER(S): 9 INJECTION, SOLUTION INTRAVENOUS at 03:47

## 2022-01-20 RX ADMIN — MORPHINE SULFATE 4 MILLIGRAM(S): 50 CAPSULE, EXTENDED RELEASE ORAL at 04:14

## 2022-01-20 RX ADMIN — PANTOPRAZOLE SODIUM 40 MILLIGRAM(S): 20 TABLET, DELAYED RELEASE ORAL at 17:28

## 2022-01-20 RX ADMIN — MORPHINE SULFATE 4 MILLIGRAM(S): 50 CAPSULE, EXTENDED RELEASE ORAL at 10:00

## 2022-01-20 RX ADMIN — MEROPENEM 100 MILLIGRAM(S): 1 INJECTION INTRAVENOUS at 05:38

## 2022-01-20 RX ADMIN — Medication 75 MILLIGRAM(S): at 09:52

## 2022-01-20 RX ADMIN — LATANOPROST 1 DROP(S): 0.05 SOLUTION/ DROPS OPHTHALMIC; TOPICAL at 21:39

## 2022-01-20 RX ADMIN — MEROPENEM 100 MILLIGRAM(S): 1 INJECTION INTRAVENOUS at 15:16

## 2022-01-20 RX ADMIN — MORPHINE SULFATE 4 MILLIGRAM(S): 50 CAPSULE, EXTENDED RELEASE ORAL at 21:37

## 2022-01-20 RX ADMIN — MORPHINE SULFATE 4 MILLIGRAM(S): 50 CAPSULE, EXTENDED RELEASE ORAL at 09:45

## 2022-01-20 RX ADMIN — MEROPENEM 100 MILLIGRAM(S): 1 INJECTION INTRAVENOUS at 21:37

## 2022-01-20 RX ADMIN — PANTOPRAZOLE SODIUM 40 MILLIGRAM(S): 20 TABLET, DELAYED RELEASE ORAL at 05:38

## 2022-01-20 RX ADMIN — MORPHINE SULFATE 4 MILLIGRAM(S): 50 CAPSULE, EXTENDED RELEASE ORAL at 16:19

## 2022-01-20 RX ADMIN — ENOXAPARIN SODIUM 40 MILLIGRAM(S): 100 INJECTION SUBCUTANEOUS at 11:19

## 2022-01-20 RX ADMIN — Medication 81 MILLIGRAM(S): at 11:18

## 2022-01-20 RX ADMIN — BRIMONIDINE TARTRATE 1 DROP(S): 2 SOLUTION/ DROPS OPHTHALMIC at 06:47

## 2022-01-20 RX ADMIN — Medication 10 UNIT(S): at 21:38

## 2022-01-20 RX ADMIN — ATORVASTATIN CALCIUM 20 MILLIGRAM(S): 80 TABLET, FILM COATED ORAL at 21:38

## 2022-01-20 RX ADMIN — MORPHINE SULFATE 4 MILLIGRAM(S): 50 CAPSULE, EXTENDED RELEASE ORAL at 22:22

## 2022-01-20 RX ADMIN — BRIMONIDINE TARTRATE 1 DROP(S): 2 SOLUTION/ DROPS OPHTHALMIC at 17:29

## 2022-01-20 RX ADMIN — LISINOPRIL 20 MILLIGRAM(S): 2.5 TABLET ORAL at 11:18

## 2022-01-20 RX ADMIN — ONDANSETRON 4 MILLIGRAM(S): 8 TABLET, FILM COATED ORAL at 11:21

## 2022-01-20 RX ADMIN — MORPHINE SULFATE 4 MILLIGRAM(S): 50 CAPSULE, EXTENDED RELEASE ORAL at 03:59

## 2022-01-20 NOTE — PROGRESS NOTE ADULT - ATTENDING COMMENTS
Although she still has some upper abdominal pain there is no nausea or vomiting and her LFTs are trending down.  We will continue antibiotic coverage as per recommendations of infectious disease.  Otherwise nothing further to do from a gastrointestinal standpoint.  Patient should just follow-up with Dr. Miller in 4 to 6 weeks for eventual removal of the current stent and repeat cholangiogram.  We will see only as needed your request.

## 2022-01-20 NOTE — PROGRESS NOTE ADULT - PROBLEM SELECTOR PLAN 1
S/p EGD/ERCP with tough biliary cannulation, bile duct and pancreatic duct stent left in placed.  As per ID continue Merren for E-Coli bacteremia cholangitis  Advance diet as tolerated.  Continue to monitor LFT's  Please follow up with Dr Miller as outpatient within the next 3-4 weeks to schedule ERCP with stent removal in 4-6 weeks.
Patient with E-coli bacteriemia cholangitis on Antibiotics as per ID  S/p EGD/ERCP revealing  non bleeding gastritis, ERCP revealed biliary ductal dilation and biliary sphincterotomy performed with purulent material and contrast flow, then successful biliary and pancreatic stent left in place.  Advance diet as tolerated   Continue antibiotics  Please follow up with Dr Miller as outpatient within the next 3-4 weeks to schedule ERCP with stent removal in 4-6 weeks

## 2022-01-20 NOTE — PROGRESS NOTE ADULT - SUBJECTIVE AND OBJECTIVE BOX
INFECTIOUS DISEASES AND INTERNAL MEDICINE at [place]  =======================================================  Fernando Recinos MD  Diplomates American Board of Internal Medicine and Infectious Diseases  Telephone [phone]  Fax            [phone]  =======================================================    ERIK [place]WOOD [id number]    Follow up:    Allergies:  Keflex (Anaphylaxis)  penicillin (Anaphylaxis)      Medications:  acetaminophen     Tablet .. 650 milliGRAM(s) Oral every 6 hours PRN  ALPRAZolam 0.25 milliGRAM(s) Oral at bedtime PRN  aluminum hydroxide/magnesium hydroxide/simethicone Suspension 30 milliLiter(s) Oral every 4 hours PRN  aspirin enteric coated 81 milliGRAM(s) Oral daily  atorvastatin 20 milliGRAM(s) Oral at bedtime  brimonidine 0.2% Ophthalmic Solution 1 Drop(s) Both EYES two times a day  dextrose 40% Gel 15 Gram(s) Oral once  dextrose 5%. 1000 milliLiter(s) IV Continuous <Continuous>  dextrose 5%. 1000 milliLiter(s) IV Continuous <Continuous>  dextrose 50% Injectable 25 Gram(s) IV Push once  dextrose 50% Injectable 12.5 Gram(s) IV Push once  dextrose 50% Injectable 25 Gram(s) IV Push once  enoxaparin Injectable 40 milliGRAM(s) SubCutaneous daily  glucagon  Injectable 1 milliGRAM(s) IntraMuscular once  indomethacin Suppository 100 milliGRAM(s) Rectal once  insulin detemir injectable (LEVEMIR) 10 Unit(s) SubCutaneous at bedtime  insulin lispro (ADMELOG) corrective regimen sliding scale   SubCutaneous every 6 hours  lactated ringers. 1000 milliLiter(s) IV Continuous <Continuous>  latanoprost 0.005% Ophthalmic Solution 1 Drop(s) Both EYES at bedtime  lisinopril 20 milliGRAM(s) Oral daily  melatonin 3 milliGRAM(s) Oral at bedtime PRN  meropenem  IVPB 1000 milliGRAM(s) IV Intermittent every 8 hours  metoprolol succinate ER 75 milliGRAM(s) Oral daily  morphine  - Injectable 2 milliGRAM(s) IV Push every 4 hours PRN  morphine  - Injectable 4 milliGRAM(s) IV Push every 4 hours PRN  ondansetron Injectable 4 milliGRAM(s) IV Push every 8 hours PRN  pantoprazole    Tablet 40 milliGRAM(s) Oral two times a day    SOCIAL       FAMILY   FAMILY HISTORY:  Family history of CHF (congestive heart failure)      REVIEW OF SYSTEMS:  CONSTITUTIONAL:  No Fever or chills  HEENT:   No diplopia or blurred vision.  No earache, sore throat or runny nose.  CARDIOVASCULAR:  No pressure, squeezing, strangling, tightness, heaviness or aching about the chest, neck, axilla or epigastrium.  RESPIRATORY:  No cough, shortness of breath, PND or orthopnea.  GASTROINTESTINAL:  No nausea, vomiting or diarrhea.  GENITOURINARY:  No dysuria, frequency or urgency. No Blood in urine  MUSCULOSKELETAL:   moves all joints  SKIN:  No change in skin, hair or nails.  NEUROLOGIC:  No paresthesias, fasciculations, seizures or weakness.  PSYCHIATRIC:  No disorder of thought or mood.  ENDOCRINE:  No heat or cold intolerance, polyuria or polydipsia.  HEMATOLOGICAL:  No easy bruising or bleeding.            Physical Exam:  ICU Vital Signs Last 24 Hrs  T(C): 36.8 (20 Jan 2022 05:00), Max: 36.8 (19 Jan 2022 12:08)  T(F): 98.2 (20 Jan 2022 05:00), Max: 98.2 (19 Jan 2022 12:08)  HR: 65 (20 Jan 2022 05:00) (65 - 78)  BP: 105/65 (20 Jan 2022 05:00) (102/57 - 132/64)  BP(mean): --  ABP: --  ABP(mean): --  RR: 18 (20 Jan 2022 05:00) (18 - 18)  SpO2: 97% (20 Jan 2022 05:00) (96% - 97%)    GEN: NAD,   HEENT: normocephalic and atraumatic. EOMI. KRISTEL.    NECK: Supple. No carotid bruits.  No lymphadenopathy or thyromegaly.  LUNGS: Clear to auscultation.  HEART: Regular rate and rhythm without murmur.  ABDOMEN: Soft, nontender, and nondistended.  Positive bowel sounds.    : No CVA tenderness  EXTREMITIES: Without any cyanosis, clubbing, rash, lesions or edema.  MSK: no joint swelling  NEUROLOGIC: Cranial nerves II through XII are grossly intact.  PSYCHIATRIC: Appropriate affect .  SKIN: No ulceration or induration present.        Labs:  Vitals:  ============  T(F): 98.2 (20 Jan 2022 05:00), Max: 98.2 (19 Jan 2022 12:08)  HR: 65 (20 Jan 2022 05:00)  BP: 105/65 (20 Jan 2022 05:00)  RR: 18 (20 Jan 2022 05:00)  SpO2: 97% (20 Jan 2022 05:00) (96% - 97%)  temp max in last 48H T(F): , Max: 98.7 (01-18-22 @ 16:40)    =======================================================  Current Antibiotics:  meropenem  IVPB 1000 milliGRAM(s) IV Intermittent every 8 hours    Other medications:  aspirin enteric coated 81 milliGRAM(s) Oral daily  atorvastatin 20 milliGRAM(s) Oral at bedtime  brimonidine 0.2% Ophthalmic Solution 1 Drop(s) Both EYES two times a day  dextrose 40% Gel 15 Gram(s) Oral once  dextrose 5%. 1000 milliLiter(s) IV Continuous <Continuous>  dextrose 5%. 1000 milliLiter(s) IV Continuous <Continuous>  dextrose 50% Injectable 25 Gram(s) IV Push once  dextrose 50% Injectable 12.5 Gram(s) IV Push once  dextrose 50% Injectable 25 Gram(s) IV Push once  enoxaparin Injectable 40 milliGRAM(s) SubCutaneous daily  glucagon  Injectable 1 milliGRAM(s) IntraMuscular once  indomethacin Suppository 100 milliGRAM(s) Rectal once  insulin detemir injectable (LEVEMIR) 10 Unit(s) SubCutaneous at bedtime  insulin lispro (ADMELOG) corrective regimen sliding scale   SubCutaneous every 6 hours  lactated ringers. 1000 milliLiter(s) IV Continuous <Continuous>  latanoprost 0.005% Ophthalmic Solution 1 Drop(s) Both EYES at bedtime  lisinopril 20 milliGRAM(s) Oral daily  metoprolol succinate ER 75 milliGRAM(s) Oral daily  pantoprazole    Tablet 40 milliGRAM(s) Oral two times a day      =======================================================  Labs:                        9.3    11.13 )-----------( 190      ( 20 Jan 2022 06:19 )             27.5     01-20    141  |  105  |  33.9<H>  ----------------------------<  90  4.0   |  27.0  |  0.92    Ca    8.9      20 Jan 2022 06:19  Phos  4.4     01-19  Mg     1.6     01-19    TPro  5.5<L>  /  Alb  2.9<L>  /  TBili  1.0  /  DBili  0.6<H>  /  AST  51<H>  /  ALT  138<H>  /  AlkPhos  184<H>  01-20      Culture - Urine (collected 01-17-22 @ 02:24)  Source: Clean Catch Clean Catch (Midstream)  Final Report (01-18-22 @ 09:50):    No growth    Culture - Blood (collected 01-16-22 @ 20:33)  Source: .Blood Blood-Peripheral  Gram Stain (01-17-22 @ 12:02):    Growth in aerobic and anaerobic bottles: Gram Negative Rods    ***Blood Panel PCR results on this specimen are available    approximately 3 hours after the Gram stain result.***    Gram stain, PCR, and/or culture results may not always    correspond due todifference in methodologies.    ************************************************************    This PCR assay was performed using [employer].    The following targets are tested for: Enterococcus,    vancomycin resistant enterococci, Listeria monocytogenes,    coagulase negative staphylococci, S. aureus,    methicillin resistant S. aureus, Streptococcus agalactiae    (Group B), S. pneumoniae, S. pyogenes (Group A),    Acinetobacter baumannii, Enterobacter cloacae, E. coli,    Klebsiella oxytoca, K. pneumoniae, Proteus sp.,    Serratia marcescens, Haemophilus influenzae,    Neisseria meningitidis, Pseudomonas aeruginosa, Candida    albicans, C. glabrata, C krusei, C parapsilosis,    C. tropicalis and the KPC resistance gene.    "Due to technical problems, Pseudomonas aeruginosa    until further notice".    Gram Stain and BCID performed by:    [facility]    [address] [place] [address]    .    TYPE: (C=Critical, N=Notification, A=Abnormal) C    TESTS:  _ GS    DATE/TIME CALLED: _ 01/17/2022 11:12:20    CALLED TO: Isela Dsouza RN    READ BACK (2 Patient Identifiers)(Y/N): _ Y    READ BACK VALUES (Y/N): _ Y    CALLED BY: Isela Lee  Final Report (01-19-22 @ 13:28):    Growth in aerobic and anaerobic bottles: Escherichia coli  Organism: Blood Culture PCR  Escherichia coli (01-19-22 @ 13:28)  Organism: Escherichia coli (01-19-22 @ 13:28)    Sensitivities:      -  Amikacin: S <=16      -  Ampicillin: S <=8 These ampicillin results predict results for amoxicillin      -  Ampicillin/Sulbactam: S <=4/2 Enterobacter, Klebsiella aerogenes, Citrobacter, and Serratia may develop resistance during prolonged therapy (3-4 days)      -  Aztreonam: S <=4      -  Cefazolin: S <=2 Enterobacter, Klebsiella aerogenes, Citrobacter, and Serratia may develop resistance during prolonged therapy (3-4 days)      -  Cefepime: S <=2      -  Cefoxitin: S <=8      -  Ceftriaxone: S <=1 Enterobacter, Klebsiella aerogenes, Citrobacter, and Serratia may develop resistance during prolonged therapy      -  Ciprofloxacin: S <=0.25      -  Ertapenem: S <=0.5      -  Gentamicin: S <=2      -  Imipenem: S <=1      -  Levofloxacin: S <=0.5      -  Meropenem: S <=1      -  Piperacillin/Tazobactam: S <=8      -  Tobramycin: S <=2      -  Trimethoprim/Sulfamethoxazole: S <=0.5/9.5      Method Type: DONITA  Organism: Blood Culture PCR (01-19-22 @ 13:28)    Sensitivities:      -  Escherichia coli: Detec      Method Type: PCR    Culture - Blood (collected 01-16-22 @ 20:32)  Source: .Blood Blood-Peripheral  Gram Stain (01-17-22 @ 11:15):    Growth in aerobic bottle: Gram Negative Rods    Gram Stain performed by:    [facility]    [address] [place] [id number]    .    TYPE: (C=Critical, N=Notification, A=Abnormal) C    TESTS:  _ GS    DATE/TIME CALLED: _ 01/17/2022 11:14:21    CALLED TO: Isela Dsouza RN    READ BACK (2 Patient Identifiers)(Y/N): _ Y    READ BACK VALUES (Y/N): _ Y    CALLED BY: Isela Lee  Final Report (01-19-22 @ 13:30):    Growth in aerobic bottle: Escherichia coli    See previous culture [id number]      Creatinine, Serum: 0.92 mg/dL (01-20-22 @ 06:19)  Creatinine, Serum: 0.90 mg/dL (01-19-22 @ 06:00)  Creatinine, Serum: 0.86 mg/dL (01-18-22 @ 06:36)  Creatinine, Serum: 1.08 mg/dL (01-17-22 @ 03:03)  Creatinine, Serum: 1.06 mg/dL (01-16-22 @ 20:27)            WBC Count: 11.13 K/uL (01-20-22 @ 06:19)  WBC Count: 9.15 K/uL (01-19-22 @ 06:00)  WBC Count: 8.66 K/uL (01-18-22 @ 06:36)  WBC Count: 21.79 K/uL (01-17-22 @ 03:03)  WBC Count: 11.97 K/uL (01-16-22 @ 20:27)    Rapid RVP Result: NotDetec (01-16-22 @ 20:32)  SARS-CoV-2: NotDetec (01-16-22 @ 20:32)    SARS-CoV-2: NotDetec (01-16-22 @ 20:32)      Alkaline Phosphatase, Serum: 184 U/L (01-20-22 @ 06:19)  Alkaline Phosphatase, Serum: 190 U/L (01-19-22 @ 06:00)  Alkaline Phosphatase, Serum: 148 U/L (01-18-22 @ 06:36)  Alkaline Phosphatase, Serum: 173 U/L (01-17-22 @ 03:03)  Alkaline Phosphatase, Serum: 201 U/L (01-16-22 @ 20:27)  Alanine Aminotransferase (ALT/SGPT): 138 U/L (01-20-22 @ 06:19)  Alanine Aminotransferase (ALT/SGPT): 192 U/L (01-19-22 @ 06:00)  Alanine Aminotransferase (ALT/SGPT): 276 U/L (01-18-22 @ 06:36)  Alanine Aminotransferase (ALT/SGPT): 476 U/L (01-17-22 @ 03:03)  Alanine Aminotransferase (ALT/SGPT): 443 U/L (01-16-22 @ 20:27)  Aspartate Aminotransferase (AST/SGOT): 51 U/L (01-20-22 @ 06:19)  Aspartate Aminotransferase (AST/SGOT): 90 U/L (01-19-22 @ 06:00)  Aspartate Aminotransferase (AST/SGOT): 172 U/L (01-18-22 @ 06:36)  Aspartate Aminotransferase (AST/SGOT): 505 U/L (01-17-22 @ 03:03)  Aspartate Aminotransferase (AST/SGOT): 653 U/L (01-16-22 @ 20:27)  Bilirubin Total, Serum: 1.0 mg/dL (01-20-22 @ 06:19)  Bilirubin Total, Serum: 2.3 mg/dL (01-19-22 @ 06:00)  Bilirubin Total, Serum: 2.5 mg/dL (01-18-22 @ 06:36)  Bilirubin Total, Serum: 1.6 mg/dL (01-17-22 @ 03:03)  Bilirubin Total, Serum: 1.3 mg/dL (01-16-22 @ 20:27)  Bilirubin Direct, Serum: 0.6 mg/dL (01-20-22 @ 06:19)

## 2022-01-20 NOTE — PROGRESS NOTE ADULT - SUBJECTIVE AND OBJECTIVE BOX
INTERVAL HPI/OVERNIGHT EVENTS:    CC: severe sepsis and metabolic encephalopathy sec possible cholangitis and E coli bacteremia improving, acute pancreatitis, hypertension, diabetes mellitus        No overnight events, tolerating clears. Mild abdominal discomfort.    Vital Signs Last 24 Hrs  T(C): 36.8 (20 Jan 2022 12:04), Max: 36.8 (19 Jan 2022 16:30)  T(F): 98.3 (20 Jan 2022 12:04), Max: 98.3 (20 Jan 2022 12:04)  HR: 69 (20 Jan 2022 12:04) (65 - 78)  BP: 133/72 (20 Jan 2022 12:04) (102/57 - 133/72)  BP(mean): --  RR: 18 (20 Jan 2022 05:00) (18 - 18)  SpO2: 93% (20 Jan 2022 12:04) (93% - 97%)    PHYSICAL EXAM:    GENERAL: alert, not in distress  CHEST/LUNG: b/l air entry  HEART: reg  ABDOMEN: soft, bs+, mild epigastric tenderness  EXTREMITIES:  no edema, tenderness    MEDICATIONS  (STANDING):  aspirin enteric coated 81 milliGRAM(s) Oral daily  atorvastatin 20 milliGRAM(s) Oral at bedtime  brimonidine 0.2% Ophthalmic Solution 1 Drop(s) Both EYES two times a day  dextrose 40% Gel 15 Gram(s) Oral once  dextrose 5%. 1000 milliLiter(s) (50 mL/Hr) IV Continuous <Continuous>  dextrose 5%. 1000 milliLiter(s) (100 mL/Hr) IV Continuous <Continuous>  dextrose 50% Injectable 25 Gram(s) IV Push once  dextrose 50% Injectable 12.5 Gram(s) IV Push once  dextrose 50% Injectable 25 Gram(s) IV Push once  enoxaparin Injectable 40 milliGRAM(s) SubCutaneous daily  glucagon  Injectable 1 milliGRAM(s) IntraMuscular once  indomethacin Suppository 100 milliGRAM(s) Rectal once  insulin detemir injectable (LEVEMIR) 10 Unit(s) SubCutaneous at bedtime  insulin lispro (ADMELOG) corrective regimen sliding scale   SubCutaneous every 6 hours  lactated ringers. 1000 milliLiter(s) (200 mL/Hr) IV Continuous <Continuous>  latanoprost 0.005% Ophthalmic Solution 1 Drop(s) Both EYES at bedtime  lisinopril 20 milliGRAM(s) Oral daily  meropenem  IVPB 1000 milliGRAM(s) IV Intermittent every 8 hours  metoprolol succinate ER 75 milliGRAM(s) Oral daily  pantoprazole    Tablet 40 milliGRAM(s) Oral two times a day    MEDICATIONS  (PRN):  acetaminophen     Tablet .. 650 milliGRAM(s) Oral every 6 hours PRN Temp greater or equal to 38C (100.4F), Mild Pain (1 - 3)  ALPRAZolam 0.25 milliGRAM(s) Oral at bedtime PRN Anxiety  aluminum hydroxide/magnesium hydroxide/simethicone Suspension 30 milliLiter(s) Oral every 4 hours PRN Dyspepsia  melatonin 3 milliGRAM(s) Oral at bedtime PRN Insomnia  morphine  - Injectable 2 milliGRAM(s) IV Push every 4 hours PRN Moderate Pain (4 - 6)  morphine  - Injectable 4 milliGRAM(s) IV Push every 4 hours PRN Severe Pain (7 - 10)  ondansetron Injectable 4 milliGRAM(s) IV Push every 8 hours PRN Nausea and/or Vomiting      Allergies    Keflex (Anaphylaxis)  penicillin (Anaphylaxis)    Intolerances          LABS:                          9.3    11.13 )-----------( 190      ( 20 Jan 2022 06:19 )             27.5     01-20    141  |  105  |  33.9<H>  ----------------------------<  90  4.0   |  27.0  |  0.92    Ca    8.9      20 Jan 2022 06:19  Phos  4.4     01-19  Mg     1.6     01-19    TPro  5.5<L>  /  Alb  2.9<L>  /  TBili  1.0  /  DBili  0.6<H>  /  AST  51<H>  /  ALT  138<H>  /  AlkPhos  184<H>  01-20          RADIOLOGY & ADDITIONAL TESTS:

## 2022-01-20 NOTE — PROGRESS NOTE ADULT - ASSESSMENT
71 yr old female with hypertension, hyperlipidemia, chronic lower back pain, chronic pancreatitis with pancreatic insufficiency, GERD presented with complaints of abdominal pain, nausea, vomiting, altered mental status. Patient noted to be febrile, confused, labs revealed elevated lipase, WBC and LFTs. CT abdomen was done revealed acute pancreatitis without necrosis. She was given IV Tylenol, fluids and pain control. Cultures were sent. Her blood cultures were positive for E coli, Azactam was added. Patient was noted to have elevated LFTs, GI evaluation was requested, for possible MRCP.    1. severe sepsis sec E coli bacteremia and  cholangitis:  s/p ERCP and biliary and pancreatic stent placement.  repeat cultures negative  continue Meropenem.    3. Metabolic encephalopathy:  Present on admission, now resolved  likely from sepsis.    3. SIRS sec acute pancreatitis:  Improved  on clears    4. Diabetes mellitus:  Continue Lantus, monitor FS, sliding scale coverage.  Will adjust Lantus pending results of above.    5. Transaminitis:  improving  likely from sludge in CBD  US abdomen ordered.    6. Hypertension:  Continue Lisinopril and Metoprolol.    7. DVT ppx:  Lovenox.    Discussed with patient and RN.   71 yr old female with hypertension, hyperlipidemia, chronic lower back pain, chronic pancreatitis with pancreatic insufficiency, GERD presented with complaints of abdominal pain, nausea, vomiting, altered mental status. Patient noted to be febrile, confused, labs revealed elevated lipase, WBC and LFTs. CT abdomen was done revealed acute pancreatitis without necrosis. She was given IV Tylenol, fluids and pain control. Cultures were sent. Her blood cultures were positive for E coli, Azactam was added. Patient was noted to have elevated LFTs, GI evaluation was requested, for possible MRCP.    1. severe sepsis sec E coli bacteremia and  cholangitis:  s/p ERCP and biliary and pancreatic stent placement.  repeat cultures negative  continue Meropenem.    3. Metabolic encephalopathy:  Present on admission, now resolved  likely from sepsis.    3. SIRS sec acute pancreatitis:  Improved  on clears    4. Diabetes mellitus:  Continue Lantus, monitor FS, sliding scale coverage.  Will adjust Lantus pending results of above.    5. Transaminitis:  improving  likely from sludge in CBD  US abdomen ordered.    6. Hypertension:  Continue Lisinopril and Metoprolol.    7. DVT ppx:  Lovenox.    Discussed with patient and RN.  Updated spouse Niko.

## 2022-01-20 NOTE — PROGRESS NOTE ADULT - ASSESSMENT
71F with PMHX IDDM, HTN, HLD, Chronic LBP, GERD, Chronic Pancreatitis c/b Pancreatic Insuffiency BIBEMS to [facility] ER c/o AMS, abdominal pain, and multiple episodes of nausea/vomiting.    . Patient was Febrile in ED Tmax 102.8F in Triage. Given Ofirmev with subsequent improvement in AMS likely fever-mediated. CTAP and Labs indicative of recurrent Pancreatitis. Hx 2 prior similar episodes requiring admission in the past. ROS +Chronic Nausea/Vomiting at least weekly per patient but currently worse. +Epigastric Abd Pain. +Fever/Chills. ROS negative unless mentioned above. +COVID Vaccinated.   PT WITH ECOLI BACTEREMIA CHOLANGITIS   UNDERWENT ERCP TODAY  PT WITH REPORTED ALLERGY PCN KEFLEX  PT REPORTS SHE ONCE DEVELOPED HEADACHE 25 YEARS AGO FORM KEFLEX AND WAS TOLD NOT  TO TAKE AGAIN AND ALSO TO AVOID PCN PT DENIES  CX WITH  SENSITIVE ECOLI     CURRENTLY   on  MERREM  WILL CONTINUE   IF REPEAT CX NEG CAN CHANGE TO  SEEMA CIPRO 500 BID TO COMPLETE TOTAL 2 WEEKS  WILL FOLLOW UP  AS NEEDED   PLEASE CALL IF QUESTIONS  WILL D/W HOSPITALIST

## 2022-01-20 NOTE — PROGRESS NOTE ADULT - SUBJECTIVE AND OBJECTIVE BOX
Patient is a 71y old  Female who presents with a chief complaint of AMS 2/2 Acute on Chronic Pancreatitis (20 Jan 2022 08:16)      INTERVAL HPI/OVERNIGHT EVENTS: Patient seen and evaluated at bedside, reporting no complaints, no overnight events, tolerating oral intake, LFT's slowly downtrend, US of the abdomen showed Hepatic steatosis, no definite intrahepatic biliary dilatation. Denies nausea, vomiting, abdominal pain, chest pain, shortness of breath, hematemesis, hematochezia, melena.      MEDICATIONS  (STANDING):  aspirin enteric coated 81 milliGRAM(s) Oral daily  atorvastatin 20 milliGRAM(s) Oral at bedtime  brimonidine 0.2% Ophthalmic Solution 1 Drop(s) Both EYES two times a day  dextrose 40% Gel 15 Gram(s) Oral once  dextrose 5%. 1000 milliLiter(s) (50 mL/Hr) IV Continuous <Continuous>  dextrose 5%. 1000 milliLiter(s) (100 mL/Hr) IV Continuous <Continuous>  dextrose 50% Injectable 25 Gram(s) IV Push once  dextrose 50% Injectable 12.5 Gram(s) IV Push once  dextrose 50% Injectable 25 Gram(s) IV Push once  enoxaparin Injectable 40 milliGRAM(s) SubCutaneous daily  glucagon  Injectable 1 milliGRAM(s) IntraMuscular once  indomethacin Suppository 100 milliGRAM(s) Rectal once  insulin detemir injectable (LEVEMIR) 10 Unit(s) SubCutaneous at bedtime  insulin lispro (ADMELOG) corrective regimen sliding scale   SubCutaneous every 6 hours  lactated ringers. 1000 milliLiter(s) (200 mL/Hr) IV Continuous <Continuous>  latanoprost 0.005% Ophthalmic Solution 1 Drop(s) Both EYES at bedtime  lisinopril 20 milliGRAM(s) Oral daily  meropenem  IVPB 1000 milliGRAM(s) IV Intermittent every 8 hours  metoprolol succinate ER 75 milliGRAM(s) Oral daily  pantoprazole    Tablet 40 milliGRAM(s) Oral two times a day    MEDICATIONS  (PRN):  acetaminophen     Tablet .. 650 milliGRAM(s) Oral every 6 hours PRN Temp greater or equal to 38C (100.4F), Mild Pain (1 - 3)  ALPRAZolam 0.25 milliGRAM(s) Oral at bedtime PRN Anxiety  aluminum hydroxide/magnesium hydroxide/simethicone Suspension 30 milliLiter(s) Oral every 4 hours PRN Dyspepsia  melatonin 3 milliGRAM(s) Oral at bedtime PRN Insomnia  morphine  - Injectable 2 milliGRAM(s) IV Push every 4 hours PRN Moderate Pain (4 - 6)  morphine  - Injectable 4 milliGRAM(s) IV Push every 4 hours PRN Severe Pain (7 - 10)  ondansetron Injectable 4 milliGRAM(s) IV Push every 8 hours PRN Nausea and/or Vomiting      Allergies    Keflex (Anaphylaxis)  penicillin (Anaphylaxis)    Intolerances    Review of Systems:  · ENMT: negative  · Respiratory and Thorax: negative  · Cardiovascular: negative  · Gastrointestinal: see above.  · Genitourinary:	negative  · Musculoskeletal: negative  · Neurological: negative  · Psychiatric: negative  · Hematology/Lymphatics: negative  · Endocrine: negative    Vital Signs Last 24 Hrs  T(C): 36.8 (20 Jan 2022 05:00), Max: 36.8 (19 Jan 2022 12:08)  T(F): 98.2 (20 Jan 2022 05:00), Max: 98.2 (19 Jan 2022 12:08)  HR: 65 (20 Jan 2022 05:00) (65 - 78)  BP: 105/65 (20 Jan 2022 05:00) (102/57 - 132/64)  BP(mean): --  RR: 18 (20 Jan 2022 05:00) (18 - 18)  SpO2: 97% (20 Jan 2022 05:00) (96% - 97%)    PHYSICAL EXAM:  · Constitutional: Elderly looking woman, on the phone, in no acute distress.   · Eyes: EOMI; PERRL; no drainage or redness  · ENMT: No oral lesions; no gross abnormalities  · Neck:	No bruits; no thyromegaly or nodules  · Back:	No deformity or limitation of movement  · Respiratory: Breath Sounds equal & clear to percussion & auscultation, no accessory muscle use  · Cardiovascular: Regular rate & rhythm, normal S1, S2; no murmurs, gallops or rubs; no S3, S4  · Gastrointestinal: Soft, non-tender, no hepatosplenomegaly, normal bowel sounds      LABS:                        9.3    11.13 )-----------( 190      ( 20 Jan 2022 06:19 )             27.5     01-20    141  |  105  |  33.9<H>  ----------------------------<  90  4.0   |  27.0  |  0.92    Ca    8.9      20 Jan 2022 06:19  Phos  4.4     01-19  Mg     1.6     01-19    TPro  5.5<L>  /  Alb  2.9<L>  /  TBili  1.0  /  DBili  0.6<H>  /  AST  51<H>  /  ALT  138<H>  /  AlkPhos  184<H>  01-20        LIVER FUNCTIONS - ( 20 Jan 2022 06:19 )  Alb: 2.9 g/dL / Pro: 5.5 g/dL / ALK PHOS: 184 U/L / ALT: 138 U/L / AST: 51 U/L / GGT: x             RADIOLOGY & ADDITIONAL TESTS:  < from: US Abdomen Upper Quadrant Right (01.19.22 @ 11:11) >    ACC: [id number] EXAM:  US ABDOMEN RT UPR QUADRANT                          PROCEDURE DATE:  01/19/2022          INTERPRETATION:  CLINICAL INFORMATION: Pancreatitis and elevated liver   function test    COMPARISON: CT abdomen pelvis 1/16/2020 2010    TECHNIQUE: Sonography of the right upper quadrant.    FINDINGS:    Liver: Hepatic steatosis. No focal hepatic masses.  Bile ducts: Normal caliber. Common bile duct measures 5 mm.  Gallbladder: Prior cholecystectomy.  Pancreas: Visualized portions are within normal limits.  Right kidney: 9.3 cm. No hydronephrosis.  Ascites: None.  IVC: Visualized portions are within normal limits.    IMPRESSION:    Hepatic steatosis.    No definite intrahepatic biliary dilatation.    < end of copied text >   Patient is a 71y old  Female who presents with a chief complaint of AMS 2/2 Acute on Chronic Pancreatitis (20 Jan 2022 08:16)    Follow-up for flare of chronic pancreatitis as well as possible superimposed cholangitis    INTERVAL HPI/OVERNIGHT EVENTS: Patient seen and evaluated at bedside,  no overnight events, tolerating oral intake, LFT's slowly downtrend, US of the abdomen showed Hepatic steatosis, no definite intrahepatic biliary dilatation. Denies nausea, vomiting, abdominal pain, chest pain, shortness of breath, hematemesis, hematochezia, melena. She still has some upper abdominal pain that she believes has not changed since undergoing an ERCP.  Her LFTs are now trending down and the bilirubin is normal.  There is no nausea or vomiting.        MEDICATIONS  (STANDING):  aspirin enteric coated 81 milliGRAM(s) Oral daily  atorvastatin 20 milliGRAM(s) Oral at bedtime  brimonidine 0.2% Ophthalmic Solution 1 Drop(s) Both EYES two times a day  dextrose 40% Gel 15 Gram(s) Oral once  dextrose 5%. 1000 milliLiter(s) (50 mL/Hr) IV Continuous <Continuous>  dextrose 5%. 1000 milliLiter(s) (100 mL/Hr) IV Continuous <Continuous>  dextrose 50% Injectable 25 Gram(s) IV Push once  dextrose 50% Injectable 12.5 Gram(s) IV Push once  dextrose 50% Injectable 25 Gram(s) IV Push once  enoxaparin Injectable 40 milliGRAM(s) SubCutaneous daily  glucagon  Injectable 1 milliGRAM(s) IntraMuscular once  indomethacin Suppository 100 milliGRAM(s) Rectal once  insulin detemir injectable (LEVEMIR) 10 Unit(s) SubCutaneous at bedtime  insulin lispro (ADMELOG) corrective regimen sliding scale   SubCutaneous every 6 hours  lactated ringers. 1000 milliLiter(s) (200 mL/Hr) IV Continuous <Continuous>  latanoprost 0.005% Ophthalmic Solution 1 Drop(s) Both EYES at bedtime  lisinopril 20 milliGRAM(s) Oral daily  meropenem  IVPB 1000 milliGRAM(s) IV Intermittent every 8 hours  metoprolol succinate ER 75 milliGRAM(s) Oral daily  pantoprazole    Tablet 40 milliGRAM(s) Oral two times a day    MEDICATIONS  (PRN):  acetaminophen     Tablet .. 650 milliGRAM(s) Oral every 6 hours PRN Temp greater or equal to 38C (100.4F), Mild Pain (1 - 3)  ALPRAZolam 0.25 milliGRAM(s) Oral at bedtime PRN Anxiety  aluminum hydroxide/magnesium hydroxide/simethicone Suspension 30 milliLiter(s) Oral every 4 hours PRN Dyspepsia  melatonin 3 milliGRAM(s) Oral at bedtime PRN Insomnia  morphine  - Injectable 2 milliGRAM(s) IV Push every 4 hours PRN Moderate Pain (4 - 6)  morphine  - Injectable 4 milliGRAM(s) IV Push every 4 hours PRN Severe Pain (7 - 10)  ondansetron Injectable 4 milliGRAM(s) IV Push every 8 hours PRN Nausea and/or Vomiting      Allergies    Keflex (Anaphylaxis)  penicillin (Anaphylaxis)    Intolerances    Review of Systems:  · ENMT: negative  · Respiratory and Thorax: negative  · Cardiovascular: negative  · Gastrointestinal: see above.  · Genitourinary:	negative  · Musculoskeletal: negative  · Neurological: negative  · Psychiatric: negative  · Hematology/Lymphatics: negative  · Endocrine: negative    Vital Signs Last 24 Hrs  T(C): 36.8 (20 Jan 2022 05:00), Max: 36.8 (19 Jan 2022 12:08)  T(F): 98.2 (20 Jan 2022 05:00), Max: 98.2 (19 Jan 2022 12:08)  HR: 65 (20 Jan 2022 05:00) (65 - 78)  BP: 105/65 (20 Jan 2022 05:00) (102/57 - 132/64)  BP(mean): --  RR: 18 (20 Jan 2022 05:00) (18 - 18)  SpO2: 97% (20 Jan 2022 05:00) (96% - 97%)    PHYSICAL EXAM:  · Constitutional: Elderly looking woman, on the phone, in no acute distress.   · Eyes: EOMI; PERRL; no drainage or redness  · ENMT: No oral lesions; no gross abnormalities  · Neck:	No bruits; no thyromegaly or nodules  · Back:	No deformity or limitation of movement  · Respiratory: Breath Sounds equal & clear to percussion & auscultation, no accessory muscle use  · Cardiovascular: Regular rate & rhythm, normal S1, S2; no murmurs, gallops or rubs; no S3, S4  · Gastrointestinal: Soft, non-tender, no hepatosplenomegaly, normal bowel sounds      LABS:                        9.3    11.13 )-----------( 190      ( 20 Jan 2022 06:19 )             27.5     01-20    141  |  105  |  33.9<H>  ----------------------------<  90  4.0   |  27.0  |  0.92    Ca    8.9      20 Jan 2022 06:19  Phos  4.4     01-19  Mg     1.6     01-19    TPro  5.5<L>  /  Alb  2.9<L>  /  TBili  1.0  /  DBili  0.6<H>  /  AST  51<H>  /  ALT  138<H>  /  AlkPhos  184<H>  01-20        LIVER FUNCTIONS - ( 20 Jan 2022 06:19 )  Alb: 2.9 g/dL / Pro: 5.5 g/dL / ALK PHOS: 184 U/L / ALT: 138 U/L / AST: 51 U/L / GGT: x             RADIOLOGY & ADDITIONAL TESTS:  < from: US Abdomen Upper Quadrant Right (01.19.22 @ 11:11) >    ACC: [id number] EXAM:  US ABDOMEN RT UPR QUADRANT                          PROCEDURE DATE:  01/19/2022          INTERPRETATION:  CLINICAL INFORMATION: Pancreatitis and elevated liver   function test    COMPARISON: CT abdomen pelvis 1/16/2020 2010    TECHNIQUE: Sonography of the right upper quadrant.    FINDINGS:    Liver: Hepatic steatosis. No focal hepatic masses.  Bile ducts: Normal caliber. Common bile duct measures 5 mm.  Gallbladder: Prior cholecystectomy.  Pancreas: Visualized portions are within normal limits.  Right kidney: 9.3 cm. No hydronephrosis.  Ascites: None.  IVC: Visualized portions are within normal limits.    IMPRESSION:    Hepatic steatosis.    No definite intrahepatic biliary dilatation.    < end of copied text >

## 2022-01-21 ENCOUNTER — TRANSCRIPTION ENCOUNTER (OUTPATIENT)
Age: 72
End: 2022-01-21

## 2022-01-21 LAB
ALBUMIN SERPL ELPH-MCNC: 3 G/DL — LOW (ref 3.3–5.2)
ALP SERPL-CCNC: 206 U/L — HIGH (ref 40–120)
ALT FLD-CCNC: 114 U/L — HIGH
AMYLASE P1 CFR SERPL: 359 U/L — HIGH (ref 36–128)
ANION GAP SERPL CALC-SCNC: 11 MMOL/L — SIGNIFICANT CHANGE UP (ref 5–17)
AST SERPL-CCNC: 61 U/L — HIGH
BILIRUB SERPL-MCNC: 0.9 MG/DL — SIGNIFICANT CHANGE UP (ref 0.4–2)
BUN SERPL-MCNC: 19.6 MG/DL — SIGNIFICANT CHANGE UP (ref 8–20)
CALCIUM SERPL-MCNC: 9 MG/DL — SIGNIFICANT CHANGE UP (ref 8.6–10.2)
CHLORIDE SERPL-SCNC: 105 MMOL/L — SIGNIFICANT CHANGE UP (ref 98–107)
CO2 SERPL-SCNC: 26 MMOL/L — SIGNIFICANT CHANGE UP (ref 22–29)
CREAT SERPL-MCNC: 0.72 MG/DL — SIGNIFICANT CHANGE UP (ref 0.5–1.3)
GLUCOSE BLDC GLUCOMTR-MCNC: 191 MG/DL — HIGH (ref 70–99)
GLUCOSE BLDC GLUCOMTR-MCNC: 204 MG/DL — HIGH (ref 70–99)
GLUCOSE BLDC GLUCOMTR-MCNC: 73 MG/DL — SIGNIFICANT CHANGE UP (ref 70–99)
GLUCOSE BLDC GLUCOMTR-MCNC: 86 MG/DL — SIGNIFICANT CHANGE UP (ref 70–99)
GLUCOSE SERPL-MCNC: 59 MG/DL — LOW (ref 70–99)
HCT VFR BLD CALC: 31.1 % — LOW (ref 34.5–45)
HGB BLD-MCNC: 10.2 G/DL — LOW (ref 11.5–15.5)
LIDOCAIN IGE QN: 296 U/L — HIGH (ref 22–51)
MAGNESIUM SERPL-MCNC: 1.8 MG/DL — SIGNIFICANT CHANGE UP (ref 1.6–2.6)
MCHC RBC-ENTMCNC: 29.9 PG — SIGNIFICANT CHANGE UP (ref 27–34)
MCHC RBC-ENTMCNC: 32.8 GM/DL — SIGNIFICANT CHANGE UP (ref 32–36)
MCV RBC AUTO: 91.2 FL — SIGNIFICANT CHANGE UP (ref 80–100)
PHOSPHATE SERPL-MCNC: 2.4 MG/DL — SIGNIFICANT CHANGE UP (ref 2.4–4.7)
PLATELET # BLD AUTO: 198 K/UL — SIGNIFICANT CHANGE UP (ref 150–400)
POTASSIUM SERPL-MCNC: 3.9 MMOL/L — SIGNIFICANT CHANGE UP (ref 3.5–5.3)
POTASSIUM SERPL-SCNC: 3.9 MMOL/L — SIGNIFICANT CHANGE UP (ref 3.5–5.3)
PROT SERPL-MCNC: 6 G/DL — LOW (ref 6.6–8.7)
RBC # BLD: 3.41 M/UL — LOW (ref 3.8–5.2)
RBC # FLD: 12.4 % — SIGNIFICANT CHANGE UP (ref 10.3–14.5)
SODIUM SERPL-SCNC: 142 MMOL/L — SIGNIFICANT CHANGE UP (ref 135–145)
SURGICAL PATHOLOGY STUDY: SIGNIFICANT CHANGE UP
WBC # BLD: 6.86 K/UL — SIGNIFICANT CHANGE UP (ref 3.8–10.5)
WBC # FLD AUTO: 6.86 K/UL — SIGNIFICANT CHANGE UP (ref 3.8–10.5)

## 2022-01-21 PROCEDURE — 99233 SBSQ HOSP IP/OBS HIGH 50: CPT

## 2022-01-21 RX ORDER — METOPROLOL TARTRATE 50 MG
25 TABLET ORAL ONCE
Refills: 0 | Status: COMPLETED | OUTPATIENT
Start: 2022-01-21 | End: 2022-01-21

## 2022-01-21 RX ADMIN — MEROPENEM 100 MILLIGRAM(S): 1 INJECTION INTRAVENOUS at 05:34

## 2022-01-21 RX ADMIN — BRIMONIDINE TARTRATE 1 DROP(S): 2 SOLUTION/ DROPS OPHTHALMIC at 05:34

## 2022-01-21 RX ADMIN — MORPHINE SULFATE 4 MILLIGRAM(S): 50 CAPSULE, EXTENDED RELEASE ORAL at 23:45

## 2022-01-21 RX ADMIN — ENOXAPARIN SODIUM 40 MILLIGRAM(S): 100 INJECTION SUBCUTANEOUS at 11:19

## 2022-01-21 RX ADMIN — MEROPENEM 100 MILLIGRAM(S): 1 INJECTION INTRAVENOUS at 21:09

## 2022-01-21 RX ADMIN — Medication 81 MILLIGRAM(S): at 11:20

## 2022-01-21 RX ADMIN — BENZOCAINE AND MENTHOL 1 LOZENGE: 5; 1 LIQUID ORAL at 23:45

## 2022-01-21 RX ADMIN — MORPHINE SULFATE 4 MILLIGRAM(S): 50 CAPSULE, EXTENDED RELEASE ORAL at 18:20

## 2022-01-21 RX ADMIN — Medication 25 MILLIGRAM(S): at 14:20

## 2022-01-21 RX ADMIN — PANTOPRAZOLE SODIUM 40 MILLIGRAM(S): 20 TABLET, DELAYED RELEASE ORAL at 18:19

## 2022-01-21 RX ADMIN — MORPHINE SULFATE 4 MILLIGRAM(S): 50 CAPSULE, EXTENDED RELEASE ORAL at 13:00

## 2022-01-21 RX ADMIN — Medication 2: at 18:19

## 2022-01-21 RX ADMIN — Medication 1: at 21:17

## 2022-01-21 RX ADMIN — Medication 0.25 MILLIGRAM(S): at 11:20

## 2022-01-21 RX ADMIN — PANTOPRAZOLE SODIUM 40 MILLIGRAM(S): 20 TABLET, DELAYED RELEASE ORAL at 05:34

## 2022-01-21 RX ADMIN — LISINOPRIL 20 MILLIGRAM(S): 2.5 TABLET ORAL at 05:34

## 2022-01-21 RX ADMIN — BRIMONIDINE TARTRATE 1 DROP(S): 2 SOLUTION/ DROPS OPHTHALMIC at 18:20

## 2022-01-21 RX ADMIN — MORPHINE SULFATE 4 MILLIGRAM(S): 50 CAPSULE, EXTENDED RELEASE ORAL at 08:17

## 2022-01-21 RX ADMIN — Medication 10 UNIT(S): at 21:08

## 2022-01-21 RX ADMIN — MORPHINE SULFATE 4 MILLIGRAM(S): 50 CAPSULE, EXTENDED RELEASE ORAL at 02:52

## 2022-01-21 RX ADMIN — Medication 75 MILLIGRAM(S): at 05:33

## 2022-01-21 RX ADMIN — MEROPENEM 100 MILLIGRAM(S): 1 INJECTION INTRAVENOUS at 14:20

## 2022-01-21 RX ADMIN — ATORVASTATIN CALCIUM 20 MILLIGRAM(S): 80 TABLET, FILM COATED ORAL at 21:09

## 2022-01-21 RX ADMIN — LATANOPROST 1 DROP(S): 0.05 SOLUTION/ DROPS OPHTHALMIC; TOPICAL at 21:09

## 2022-01-21 NOTE — DIETITIAN INITIAL EVALUATION ADULT. - PERTINENT MEDS FT
MEDICATIONS  (STANDING):  aspirin enteric coated 81 milliGRAM(s) Oral daily  atorvastatin 20 milliGRAM(s) Oral at bedtime  brimonidine 0.2% Ophthalmic Solution 1 Drop(s) Both EYES two times a day  dextrose 40% Gel 15 Gram(s) Oral once  dextrose 5%. 1000 milliLiter(s) (50 mL/Hr) IV Continuous <Continuous>  dextrose 5%. 1000 milliLiter(s) (100 mL/Hr) IV Continuous <Continuous>  dextrose 50% Injectable 25 Gram(s) IV Push once  dextrose 50% Injectable 12.5 Gram(s) IV Push once  dextrose 50% Injectable 25 Gram(s) IV Push once  enoxaparin Injectable 40 milliGRAM(s) SubCutaneous daily  glucagon  Injectable 1 milliGRAM(s) IntraMuscular once  indomethacin Suppository 100 milliGRAM(s) Rectal once  insulin detemir injectable (LEVEMIR) 10 Unit(s) SubCutaneous at bedtime  insulin lispro (ADMELOG) corrective regimen sliding scale   SubCutaneous every 6 hours  latanoprost 0.005% Ophthalmic Solution 1 Drop(s) Both EYES at bedtime  lisinopril 20 milliGRAM(s) Oral daily  meropenem  IVPB 1000 milliGRAM(s) IV Intermittent every 8 hours  metoprolol succinate ER 75 milliGRAM(s) Oral daily  metoprolol tartrate 25 milliGRAM(s) Oral once  pantoprazole    Tablet 40 milliGRAM(s) Oral two times a day    MEDICATIONS  (PRN):  acetaminophen     Tablet .. 650 milliGRAM(s) Oral every 6 hours PRN Temp greater or equal to 38C (100.4F), Mild Pain (1 - 3)  ALPRAZolam 0.25 milliGRAM(s) Oral at bedtime PRN Anxiety  aluminum hydroxide/magnesium hydroxide/simethicone Suspension 30 milliLiter(s) Oral every 4 hours PRN Dyspepsia  benzocaine 15 mG/menthol 3.6 mG Lozenge 1 Lozenge Oral every 6 hours PRN Sore Throat  melatonin 3 milliGRAM(s) Oral at bedtime PRN Insomnia  morphine  - Injectable 4 milliGRAM(s) IV Push every 4 hours PRN Severe Pain (7 - 10)  morphine  - Injectable 2 milliGRAM(s) IV Push every 4 hours PRN Moderate Pain (4 - 6)  ondansetron Injectable 4 milliGRAM(s) IV Push every 8 hours PRN Nausea and/or Vomiting

## 2022-01-21 NOTE — DISCHARGE NOTE PROVIDER - NSDCMRMEDTOKEN_GEN_ALL_CORE_FT
ALPRAZolam 0.25 mg oral tablet: 1 tab(s) orally once a day (at bedtime), As Needed  Aspirin Enteric Coated 81 mg oral delayed release tablet: 1 tab(s) orally once a day  atorvastatin 20 mg oral tablet: 1 tab(s) orally once a day  brimonidine 0.2% ophthalmic solution: 1 drop(s) to each affected eye 2 times a day  Creon 6000 units oral delayed release capsule: 1 cap(s) orally 3 times a day  latanoprost 0.005% ophthalmic solution: 1 drop(s) to each affected eye once a day (in the evening)  Levemir 100 units/mL subcutaneous solution: 10 unit(s) subcutaneous once a day  metFORMIN 500 mg oral tablet: 1 tab(s) orally once a day  oxyCODONE 5 mg oral tablet: 1 tab(s) orally 4 times a day, As Needed  Protonix 40 mg oral delayed release tablet: 1 tab(s) orally 2 times a day   ramipril 5 mg oral capsule: 1 cap(s) orally once a day  Restasis 0.05% ophthalmic emulsion: 1 drop(s) to each affected eye every 12 hours  Toprol-XL: 75 milligram(s) orally once a day   ALPRAZolam 0.25 mg oral tablet: 1 tab(s) orally once a day (at bedtime), As Needed  Aspirin Enteric Coated 81 mg oral delayed release tablet: 1 tab(s) orally once a day  atorvastatin 20 mg oral tablet: 1 tab(s) orally once a day  brimonidine 0.2% ophthalmic solution: 1 drop(s) to each affected eye 2 times a day  ciprofloxacin 500 mg oral tablet: 1 tab(s) orally 2 times a day   Creon 6000 units oral delayed release capsule: 1 cap(s) orally 3 times a day  Florastor 250 mg oral capsule: 1 cap(s) orally 2 times a day   latanoprost 0.005% ophthalmic solution: 1 drop(s) to each affected eye once a day (in the evening)  Levemir 100 units/mL subcutaneous solution: 10 unit(s) subcutaneous once a day  lisinopril 40 mg oral tablet: 1 tab(s) orally once a day  metFORMIN 500 mg oral tablet: 1 tab(s) orally once a day  oxyCODONE 5 mg oral tablet: 1 tab(s) orally 4 times a day, As Needed  Protonix 40 mg oral delayed release tablet: 1 tab(s) orally 2 times a day   Restasis 0.05% ophthalmic emulsion: 1 drop(s) to each affected eye every 12 hours  Toprol-XL: 75 milligram(s) orally once a day

## 2022-01-21 NOTE — DISCHARGE NOTE PROVIDER - CARE PROVIDERS DIRECT ADDRESSES
[email or web address]

## 2022-01-21 NOTE — DIETITIAN INITIAL EVALUATION ADULT. - OTHER INFO
71 year old female with hypertension, hyperlipidemia, chronic lower back pain, chronic pancreatitis with pancreatic insufficiency, GERD presented with complaints of abdominal pain, nausea, vomiting, altered mental status. CT abdomen was done revealed acute pancreatitis without necrosis. Blood cultures were positive for E coli. S/p ERCP with biliary and pancreatic stent placement. Attempted to interview, pt sleeping soundly. Currently on full liquid diet with fair intake. RD to follow up with subjective interview/diet education as feasible.

## 2022-01-21 NOTE — DISCHARGE NOTE PROVIDER - NSDCCPCAREPLAN_GEN_ALL_CORE_FT
PRINCIPAL DISCHARGE DIAGNOSIS  Diagnosis: Acute cholangitis  Assessment and Plan of Treatment: Complete course of antibiotics  s/p biliary and pancreatic stent placement.   gi follow up in 1-2 weeks.      SECONDARY DISCHARGE DIAGNOSES  Diagnosis: E coli bacteremia  Assessment and Plan of Treatment: resolved, complete course of Ciprofloxacin.    Diagnosis: Severe sepsis  Assessment and Plan of Treatment: resolved.    Diagnosis: Diabetes mellitus  Assessment and Plan of Treatment: continue Lantus, monitor fingersticks    Diagnosis: Acute pancreatitis  Assessment and Plan of Treatment: resolved, s/p ERCP.     PRINCIPAL DISCHARGE DIAGNOSIS  Diagnosis: Acute cholangitis  Assessment and Plan of Treatment: Complete course of antibiotics as prescribed  You will need to continue Ciprofloxacin twice a day for an additional 10 days. Please take it along with a probiotic.  Your liver enzymes are improving after insertion of a biliary and pancreatic stent placement.   Please follow up with your PCP within 1 week and please follow up with Dr. Miller within 1-2 weeks.      SECONDARY DISCHARGE DIAGNOSES  Diagnosis: E coli bacteremia  Assessment and Plan of Treatment: resolved, complete course of Ciprofloxacin as above.    Diagnosis: Diabetes mellitus  Assessment and Plan of Treatment: continue your home medications and follow up with your PCP within 1 week.    Diagnosis: Acute pancreatitis  Assessment and Plan of Treatment: Resolved  Please continue creon with your meals and follow up with Dr. Miller within 1-2 weeks.    Diagnosis: Hypertension  Assessment and Plan of Treatment: BP better controlled with increase in lisinopril  Please do not take rampiril any longer and continue lisinopril along with metoprolol  Please keep a blood pressure log and bring it to your next PCP appointment to discuss any further adjustments in your medications as needed.   Adhere to a low sodium diet.

## 2022-01-21 NOTE — DISCHARGE NOTE PROVIDER - CARE PROVIDER_API CALL
Dennys Miller)  Gastroenterology; Internal Medicine  [address] [place] [address]  Phone: [phone]  Fax: [phone]  Follow Up Time:

## 2022-01-21 NOTE — DISCHARGE NOTE PROVIDER - ATTENDING DISCHARGE PHYSICAL EXAMINATION:
PHYSICAL EXAM:    GENERAL: elderly female, laying in bed, NAD  HEAD:  Atraumatic, Normocephalic  EYES: EOMI, PERRLA, conjunctiva and sclera clear  ENMT: Moist mucous membranes  NECK: Supple  NERVOUS SYSTEM:  Alert & Oriented X3, Motor Strength 5/5 B/L upper and lower extremities   CHEST/LUNG: Clear to auscultation bilaterally   HEART: Regular rate and rhythm, + S1/S2  ABDOMEN: Soft, nontender, no rebound or guarding  EXTREMITIES:  no pedal edema

## 2022-01-21 NOTE — DISCHARGE NOTE PROVIDER - HOSPITAL COURSE
71 yr old female with hypertension, hyperlipidemia, chronic lower back pain, chronic pancreatitis with pancreatic insufficiency, GERD presented with complaints of abdominal pain, nausea, vomiting, altered mental status. Patient noted to be febrile, confused, labs revealed elevated lipase, WBC and LFTs. CT abdomen was done revealed acute pancreatitis without necrosis. She was given IV Tylenol, fluids and pain control. Cultures were sent. Her blood cultures were positive for E coli, Azactam was added. Patient was noted to have elevated LFTs, GI evaluation was requested, for possible MRCP. She underwent ERCP with biliary and pancreatic stent placement. ID consulted, antibiotics changed to Meropenem. She improved clinically and diet was advanced, repeat blood cultures were negative. ID advised total 2 weeks of antibiotics, to be discharged on Ciprofloxacin on discharge.   Medically stable for discharge home.    Spent 45 mins in discharge plan and documentation.      Patient is a 71 yr old female with hypertension, hyperlipidemia, chronic lower back pain, chronic pancreatitis with pancreatic insufficiency, GERD who presented with complaints of abdominal pain, nausea, vomiting, altered mental status. Patient noted to be febrile, confused, labs revealed elevated lipase, WBC and LFTs. CT abdomen was done revealed acute pancreatitis without necrosis. She was given IV Tylenol, fluids and pain control. Cultures were sent. Her blood cultures were positive for E coli, Azactam was added. Patient was noted to have elevated LFTs, GI evaluation was requested, for possible MRCP. She underwent ERCP with biliary and pancreatic stent placement. ID consulted, antibiotics changed to Meropenem. She improved clinically and diet was advanced, repeat blood cultures were negative. ID advised total 2 weeks of antibiotics, to be discharged on Ciprofloxacin on discharge. Patient seen and examined today; tolerating regular diet. Denies N/V or abdominal pain. Patient will follow up with Dr. Miller as outpatient. Patient is medically stable for discharge home today.    Plan discussed with patient and her  Niko detail.

## 2022-01-21 NOTE — PROGRESS NOTE ADULT - SUBJECTIVE AND OBJECTIVE BOX
INTERVAL HPI/OVERNIGHT EVENTS:    CC:  severe sepsis and metabolic encephalopathy sec possible cholangitis and E coli bacteremia improving, acute pancreatitis, hypertension, diabetes mellitus        No overnight events, on full liquids, still with some abdominal discomfort. Anxious about going home.     Vital Signs Last 24 Hrs  T(C): 36.7 (21 Jan 2022 10:55), Max: 37.2 (20 Jan 2022 22:35)  T(F): 98.1 (21 Jan 2022 10:55), Max: 98.9 (20 Jan 2022 22:35)  HR: 68 (21 Jan 2022 10:55) (68 - 76)  BP: 176/77 (21 Jan 2022 10:55) (133/72 - 176/81)  BP(mean): --  RR: 18 (21 Jan 2022 10:55) (18 - 18)  SpO2: 93% (21 Jan 2022 10:55) (93% - 95%)    PHYSICAL EXAM:    GENERAL: alert, not in distress  CHEST/LUNG: b/l air entry  HEART: reg  ABDOMEN: soft, mild tenderness, not worse, bs+  EXTREMITIES:  no edema, tenderness    MEDICATIONS  (STANDING):  aspirin enteric coated 81 milliGRAM(s) Oral daily  atorvastatin 20 milliGRAM(s) Oral at bedtime  brimonidine 0.2% Ophthalmic Solution 1 Drop(s) Both EYES two times a day  dextrose 40% Gel 15 Gram(s) Oral once  dextrose 5%. 1000 milliLiter(s) (50 mL/Hr) IV Continuous <Continuous>  dextrose 5%. 1000 milliLiter(s) (100 mL/Hr) IV Continuous <Continuous>  dextrose 50% Injectable 25 Gram(s) IV Push once  dextrose 50% Injectable 12.5 Gram(s) IV Push once  dextrose 50% Injectable 25 Gram(s) IV Push once  enoxaparin Injectable 40 milliGRAM(s) SubCutaneous daily  glucagon  Injectable 1 milliGRAM(s) IntraMuscular once  indomethacin Suppository 100 milliGRAM(s) Rectal once  insulin detemir injectable (LEVEMIR) 10 Unit(s) SubCutaneous at bedtime  insulin lispro (ADMELOG) corrective regimen sliding scale   SubCutaneous every 6 hours  lactated ringers. 1000 milliLiter(s) (200 mL/Hr) IV Continuous <Continuous>  latanoprost 0.005% Ophthalmic Solution 1 Drop(s) Both EYES at bedtime  lisinopril 20 milliGRAM(s) Oral daily  meropenem  IVPB 1000 milliGRAM(s) IV Intermittent every 8 hours  metoprolol succinate ER 75 milliGRAM(s) Oral daily  pantoprazole    Tablet 40 milliGRAM(s) Oral two times a day    MEDICATIONS  (PRN):  acetaminophen     Tablet .. 650 milliGRAM(s) Oral every 6 hours PRN Temp greater or equal to 38C (100.4F), Mild Pain (1 - 3)  ALPRAZolam 0.25 milliGRAM(s) Oral at bedtime PRN Anxiety  aluminum hydroxide/magnesium hydroxide/simethicone Suspension 30 milliLiter(s) Oral every 4 hours PRN Dyspepsia  benzocaine 15 mG/menthol 3.6 mG Lozenge 1 Lozenge Oral every 6 hours PRN Sore Throat  melatonin 3 milliGRAM(s) Oral at bedtime PRN Insomnia  morphine  - Injectable 4 milliGRAM(s) IV Push every 4 hours PRN Severe Pain (7 - 10)  morphine  - Injectable 2 milliGRAM(s) IV Push every 4 hours PRN Moderate Pain (4 - 6)  ondansetron Injectable 4 milliGRAM(s) IV Push every 8 hours PRN Nausea and/or Vomiting      Allergies    Keflex (Anaphylaxis)  penicillin (Anaphylaxis)    Intolerances          LABS:                          10.2   6.86  )-----------( 198      ( 21 Jan 2022 09:53 )             31.1     01-21    142  |  105  |  19.6  ----------------------------<  59<L>  3.9   |  26.0  |  0.72    Ca    9.0      21 Jan 2022 09:53  Phos  2.4     01-21  Mg     1.8     01-21    TPro  6.0<L>  /  Alb  3.0<L>  /  TBili  0.9  /  DBili  x   /  AST  61<H>  /  ALT  114<H>  /  AlkPhos  206<H>  01-21          RADIOLOGY & ADDITIONAL TESTS:

## 2022-01-21 NOTE — PROGRESS NOTE ADULT - ASSESSMENT
71 yr old female with hypertension, hyperlipidemia, chronic lower back pain, chronic pancreatitis with pancreatic insufficiency, GERD presented with complaints of abdominal pain, nausea, vomiting, altered mental status. Patient noted to be febrile, confused, labs revealed elevated lipase, WBC and LFTs. CT abdomen was done revealed acute pancreatitis without necrosis. She was given IV Tylenol, fluids and pain control. Cultures were sent. Her blood cultures were positive for E coli, Azactam was added. Patient was noted to have elevated LFTs, GI evaluation was requested, for possible MRCP. Antibiotics changed to Meropenem. She underwent ERCP with biliary and pancreatic stent placement. Repeat blood cultures were negative. Diet was advanced.     1. severe sepsis sec E coli bacteremia and  cholangitis:  s/p ERCP and biliary and pancreatic stent placement.  repeat cultures negative  continue Meropenem.  Will change to PO antibiotics on discharge, Ciprofloxacin to complete 2 weeks.  Needs GI follow up on discharge for repeat cholangiogram and stent removal.   continue full liquids today as she has some abdominal pain      3. Metabolic encephalopathy:  Present on admission, now resolved  likely from sepsis.    3. SIRS sec acute pancreatitis:  Improved  on full liquid, advance to regular in am if better.    4. Diabetes mellitus:  Continue Lantus, monitor FS, sliding scale coverage.    5. Transaminitis:  improving  likely from sludge in CBD    6. Hypertension:  Continue Lisinopril and Metoprolol.    7. DVT ppx:  Lovenox.    Discussed with patient and RN.  Anticipate discharge in 24 hrs-48 hrs if able to tolerate regular diet and pain is better.    71 yr old female with hypertension, hyperlipidemia, chronic lower back pain, chronic pancreatitis with pancreatic insufficiency, GERD presented with complaints of abdominal pain, nausea, vomiting, altered mental status. Patient noted to be febrile, confused, labs revealed elevated lipase, WBC and LFTs. CT abdomen was done revealed acute pancreatitis without necrosis. She was given IV Tylenol, fluids and pain control. Cultures were sent. Her blood cultures were positive for E coli, Azactam was added. Patient was noted to have elevated LFTs, GI evaluation was requested, for possible MRCP. Antibiotics changed to Meropenem. She underwent ERCP with biliary and pancreatic stent placement. Repeat blood cultures were negative. Diet was advanced.     1. severe sepsis sec E coli bacteremia and  cholangitis:  s/p ERCP and biliary and pancreatic stent placement.  repeat cultures negative  continue Meropenem.  Will change to PO antibiotics on discharge, Ciprofloxacin to complete 2 weeks.  Needs GI follow up on discharge for repeat cholangiogram and stent removal.   continue full liquids today as she has some abdominal pain      3. Metabolic encephalopathy:  Present on admission, now resolved  likely from sepsis.    3. SIRS sec acute pancreatitis:  Improved  on full liquid, advance to regular in am if better.  elevation in lipase and amylase today, repeat in am.    4. Diabetes mellitus:  Continue Lantus, monitor FS, sliding scale coverage.    5. Transaminitis:  improving  likely from sludge in CBD    6. Hypertension:  Continue Lisinopril and Metoprolol.    7. DVT ppx:  Lovenox.    Discussed with patient and RN.  Anticipate discharge in 24 hrs-48 hrs if able to tolerate regular diet and pain is better.    71 yr old female with hypertension, hyperlipidemia, chronic lower back pain, chronic pancreatitis with pancreatic insufficiency, GERD presented with complaints of abdominal pain, nausea, vomiting, altered mental status. Patient noted to be febrile, confused, labs revealed elevated lipase, WBC and LFTs. CT abdomen was done revealed acute pancreatitis without necrosis. She was given IV Tylenol, fluids and pain control. Cultures were sent. Her blood cultures were positive for E coli, Azactam was added. Patient was noted to have elevated LFTs, GI evaluation was requested, for possible MRCP. Antibiotics changed to Meropenem. She underwent ERCP with biliary and pancreatic stent placement. Repeat blood cultures were negative. Diet was advanced.     1. severe sepsis sec E coli bacteremia and  cholangitis:  s/p ERCP and biliary and pancreatic stent placement.  repeat cultures negative  continue Meropenem.  Will change to PO antibiotics on discharge, Ciprofloxacin to complete 2 weeks.  Needs GI follow up on discharge for repeat cholangiogram and stent removal.   continue full liquids today as she has some abdominal pain      3. Metabolic encephalopathy:  Present on admission, now resolved  likely from sepsis.    3. SIRS sec acute pancreatitis:  Improved  on full liquid, advance to regular in am if better.  elevation in lipase and amylase today, repeat in am.    4. Diabetes mellitus:  Continue Lantus, monitor FS, sliding scale coverage.    5. Transaminitis:  improving  likely from sludge in CBD    6. Hypertension:  Continue Lisinopril and Metoprolol.    7. DVT ppx:  Lovenox.    Discussed with patient and RN.  Anticipate discharge in 24 hrs-48 hrs if able to tolerate regular diet and pain is better.  Updated  Niko.

## 2022-01-21 NOTE — DIETITIAN INITIAL EVALUATION ADULT. - PERTINENT LABORATORY DATA
01-21 Na142 mmol/L Glu 59 mg/dL<L> K+ 3.9 mmol/L Cr  0.72 mg/dL BUN 19.6 mg/dL Phos 2.4 mg/dL Alb 3.0 g/dL<L> PAB n/a

## 2022-01-22 LAB
ALBUMIN SERPL ELPH-MCNC: 3.1 G/DL — LOW (ref 3.3–5.2)
ALP SERPL-CCNC: 197 U/L — HIGH (ref 40–120)
ALT FLD-CCNC: 120 U/L — HIGH
AMYLASE P1 CFR SERPL: 139 U/L — HIGH (ref 36–128)
ANION GAP SERPL CALC-SCNC: 13 MMOL/L — SIGNIFICANT CHANGE UP (ref 5–17)
AST SERPL-CCNC: 72 U/L — HIGH
BILIRUB SERPL-MCNC: 1 MG/DL — SIGNIFICANT CHANGE UP (ref 0.4–2)
BUN SERPL-MCNC: 12.2 MG/DL — SIGNIFICANT CHANGE UP (ref 8–20)
CALCIUM SERPL-MCNC: 9.2 MG/DL — SIGNIFICANT CHANGE UP (ref 8.6–10.2)
CHLORIDE SERPL-SCNC: 101 MMOL/L — SIGNIFICANT CHANGE UP (ref 98–107)
CO2 SERPL-SCNC: 28 MMOL/L — SIGNIFICANT CHANGE UP (ref 22–29)
CREAT SERPL-MCNC: 0.71 MG/DL — SIGNIFICANT CHANGE UP (ref 0.5–1.3)
GLUCOSE BLDC GLUCOMTR-MCNC: 101 MG/DL — HIGH (ref 70–99)
GLUCOSE BLDC GLUCOMTR-MCNC: 200 MG/DL — HIGH (ref 70–99)
GLUCOSE BLDC GLUCOMTR-MCNC: 215 MG/DL — HIGH (ref 70–99)
GLUCOSE SERPL-MCNC: 105 MG/DL — HIGH (ref 70–99)
HCT VFR BLD CALC: 31 % — LOW (ref 34.5–45)
HGB BLD-MCNC: 10.3 G/DL — LOW (ref 11.5–15.5)
LIDOCAIN IGE QN: 109 U/L — HIGH (ref 22–51)
MAGNESIUM SERPL-MCNC: 1.7 MG/DL — LOW (ref 1.8–2.6)
MCHC RBC-ENTMCNC: 29.7 PG — SIGNIFICANT CHANGE UP (ref 27–34)
MCHC RBC-ENTMCNC: 33.2 GM/DL — SIGNIFICANT CHANGE UP (ref 32–36)
MCV RBC AUTO: 89.3 FL — SIGNIFICANT CHANGE UP (ref 80–100)
PHOSPHATE SERPL-MCNC: 2.2 MG/DL — LOW (ref 2.4–4.7)
PLATELET # BLD AUTO: 212 K/UL — SIGNIFICANT CHANGE UP (ref 150–400)
POTASSIUM SERPL-MCNC: 3.6 MMOL/L — SIGNIFICANT CHANGE UP (ref 3.5–5.3)
POTASSIUM SERPL-SCNC: 3.6 MMOL/L — SIGNIFICANT CHANGE UP (ref 3.5–5.3)
PROT SERPL-MCNC: 6.3 G/DL — LOW (ref 6.6–8.7)
RBC # BLD: 3.47 M/UL — LOW (ref 3.8–5.2)
RBC # FLD: 12.2 % — SIGNIFICANT CHANGE UP (ref 10.3–14.5)
SODIUM SERPL-SCNC: 142 MMOL/L — SIGNIFICANT CHANGE UP (ref 135–145)
WBC # BLD: 6.19 K/UL — SIGNIFICANT CHANGE UP (ref 3.8–10.5)
WBC # FLD AUTO: 6.19 K/UL — SIGNIFICANT CHANGE UP (ref 3.8–10.5)

## 2022-01-22 PROCEDURE — 99233 SBSQ HOSP IP/OBS HIGH 50: CPT

## 2022-01-22 RX ORDER — MAGNESIUM SULFATE 500 MG/ML
1 VIAL (ML) INJECTION ONCE
Refills: 0 | Status: COMPLETED | OUTPATIENT
Start: 2022-01-22 | End: 2022-01-22

## 2022-01-22 RX ORDER — HYDRALAZINE HCL 50 MG
10 TABLET ORAL EVERY 6 HOURS
Refills: 0 | Status: DISCONTINUED | OUTPATIENT
Start: 2022-01-22 | End: 2022-01-23

## 2022-01-22 RX ORDER — LISINOPRIL 2.5 MG/1
40 TABLET ORAL DAILY
Refills: 0 | Status: DISCONTINUED | OUTPATIENT
Start: 2022-01-22 | End: 2022-01-23

## 2022-01-22 RX ORDER — POTASSIUM PHOSPHATE, MONOBASIC POTASSIUM PHOSPHATE, DIBASIC 236; 224 MG/ML; MG/ML
15 INJECTION, SOLUTION INTRAVENOUS ONCE
Refills: 0 | Status: COMPLETED | OUTPATIENT
Start: 2022-01-22 | End: 2022-01-22

## 2022-01-22 RX ADMIN — POTASSIUM PHOSPHATE, MONOBASIC POTASSIUM PHOSPHATE, DIBASIC 62.5 MILLIMOLE(S): 236; 224 INJECTION, SOLUTION INTRAVENOUS at 11:04

## 2022-01-22 RX ADMIN — LATANOPROST 1 DROP(S): 0.05 SOLUTION/ DROPS OPHTHALMIC; TOPICAL at 23:42

## 2022-01-22 RX ADMIN — Medication 75 MILLIGRAM(S): at 05:24

## 2022-01-22 RX ADMIN — LISINOPRIL 20 MILLIGRAM(S): 2.5 TABLET ORAL at 05:24

## 2022-01-22 RX ADMIN — MORPHINE SULFATE 4 MILLIGRAM(S): 50 CAPSULE, EXTENDED RELEASE ORAL at 00:00

## 2022-01-22 RX ADMIN — MEROPENEM 100 MILLIGRAM(S): 1 INJECTION INTRAVENOUS at 13:07

## 2022-01-22 RX ADMIN — Medication 2: at 16:52

## 2022-01-22 RX ADMIN — MEROPENEM 100 MILLIGRAM(S): 1 INJECTION INTRAVENOUS at 05:25

## 2022-01-22 RX ADMIN — PANTOPRAZOLE SODIUM 40 MILLIGRAM(S): 20 TABLET, DELAYED RELEASE ORAL at 05:24

## 2022-01-22 RX ADMIN — MORPHINE SULFATE 4 MILLIGRAM(S): 50 CAPSULE, EXTENDED RELEASE ORAL at 22:35

## 2022-01-22 RX ADMIN — BRIMONIDINE TARTRATE 1 DROP(S): 2 SOLUTION/ DROPS OPHTHALMIC at 05:25

## 2022-01-22 RX ADMIN — LISINOPRIL 40 MILLIGRAM(S): 2.5 TABLET ORAL at 13:06

## 2022-01-22 RX ADMIN — MORPHINE SULFATE 4 MILLIGRAM(S): 50 CAPSULE, EXTENDED RELEASE ORAL at 06:16

## 2022-01-22 RX ADMIN — BRIMONIDINE TARTRATE 1 DROP(S): 2 SOLUTION/ DROPS OPHTHALMIC at 16:46

## 2022-01-22 RX ADMIN — Medication 1: at 23:40

## 2022-01-22 RX ADMIN — Medication 81 MILLIGRAM(S): at 11:04

## 2022-01-22 RX ADMIN — MEROPENEM 100 MILLIGRAM(S): 1 INJECTION INTRAVENOUS at 22:14

## 2022-01-22 RX ADMIN — Medication 10 UNIT(S): at 23:41

## 2022-01-22 RX ADMIN — MORPHINE SULFATE 4 MILLIGRAM(S): 50 CAPSULE, EXTENDED RELEASE ORAL at 16:44

## 2022-01-22 RX ADMIN — MORPHINE SULFATE 4 MILLIGRAM(S): 50 CAPSULE, EXTENDED RELEASE ORAL at 21:35

## 2022-01-22 RX ADMIN — Medication 10 MILLIGRAM(S): at 22:17

## 2022-01-22 RX ADMIN — ENOXAPARIN SODIUM 40 MILLIGRAM(S): 100 INJECTION SUBCUTANEOUS at 11:04

## 2022-01-22 RX ADMIN — ONDANSETRON 4 MILLIGRAM(S): 8 TABLET, FILM COATED ORAL at 23:35

## 2022-01-22 RX ADMIN — Medication 100 GRAM(S): at 11:05

## 2022-01-22 RX ADMIN — MORPHINE SULFATE 4 MILLIGRAM(S): 50 CAPSULE, EXTENDED RELEASE ORAL at 11:04

## 2022-01-22 RX ADMIN — Medication 0.25 MILLIGRAM(S): at 03:21

## 2022-01-22 RX ADMIN — PANTOPRAZOLE SODIUM 40 MILLIGRAM(S): 20 TABLET, DELAYED RELEASE ORAL at 16:44

## 2022-01-22 NOTE — PROGRESS NOTE ADULT - SUBJECTIVE AND OBJECTIVE BOX
CHIEF COMPLAINT/INTERVAL HISTORY:    Patient is a 71y old  Female who presents with a chief complaint of AMS 2/2 Acute on Chronic Pancreatitis (22 Jan 2022 15:11)    SUBJECTIVE & OBJECTIVE: Pt seen and examined at bedside. No overnight events. Complaining of abdominal pain and reluctant to advance diet but agreeable; tolerated diet without any acute issues. BP remains uncontrolled; medications adjusted.    ROS: No chest pain, palpitations, SOB, light headedness, dizziness, headache, nausea/vomiting, fevers/chills, dysuria or increased urinary frequency.    ICU Vital Signs Last 24 Hrs  T(C): 36.5 (22 Jan 2022 17:39), Max: 37.1 (21 Jan 2022 22:30)  T(F): 97.7 (22 Jan 2022 17:39), Max: 98.7 (21 Jan 2022 22:30)  HR: 78 (22 Jan 2022 17:39) (68 - 78)  BP: 170/80 (22 Jan 2022 17:39) (162/80 - 180/78)  RR: 17 (22 Jan 2022 17:39) (17 - 18)  SpO2: 94% (22 Jan 2022 17:39) (94% - 96%)    MEDICATIONS  (STANDING):  aspirin enteric coated 81 milliGRAM(s) Oral daily  atorvastatin 20 milliGRAM(s) Oral at bedtime  brimonidine 0.2% Ophthalmic Solution 1 Drop(s) Both EYES two times a day  dextrose 40% Gel 15 Gram(s) Oral once  dextrose 5%. 1000 milliLiter(s) (50 mL/Hr) IV Continuous <Continuous>  dextrose 5%. 1000 milliLiter(s) (100 mL/Hr) IV Continuous <Continuous>  dextrose 50% Injectable 25 Gram(s) IV Push once  dextrose 50% Injectable 12.5 Gram(s) IV Push once  dextrose 50% Injectable 25 Gram(s) IV Push once  enoxaparin Injectable 40 milliGRAM(s) SubCutaneous daily  glucagon  Injectable 1 milliGRAM(s) IntraMuscular once  indomethacin Suppository 100 milliGRAM(s) Rectal once  insulin detemir injectable (LEVEMIR) 10 Unit(s) SubCutaneous at bedtime  insulin lispro (ADMELOG) corrective regimen sliding scale   SubCutaneous every 6 hours  latanoprost 0.005% Ophthalmic Solution 1 Drop(s) Both EYES at bedtime  lisinopril 40 milliGRAM(s) Oral daily  meropenem  IVPB 1000 milliGRAM(s) IV Intermittent every 8 hours  metoprolol succinate ER 75 milliGRAM(s) Oral daily  pantoprazole    Tablet 40 milliGRAM(s) Oral two times a day    MEDICATIONS  (PRN):  acetaminophen     Tablet .. 650 milliGRAM(s) Oral every 6 hours PRN Temp greater or equal to 38C (100.4F), Mild Pain (1 - 3)  ALPRAZolam 0.25 milliGRAM(s) Oral at bedtime PRN Anxiety  aluminum hydroxide/magnesium hydroxide/simethicone Suspension 30 milliLiter(s) Oral every 4 hours PRN Dyspepsia  benzocaine 15 mG/menthol 3.6 mG Lozenge 1 Lozenge Oral every 6 hours PRN Sore Throat  hydrALAZINE Injectable 10 milliGRAM(s) IV Push every 6 hours PRN SBP > 160  melatonin 3 milliGRAM(s) Oral at bedtime PRN Insomnia  morphine  - Injectable 2 milliGRAM(s) IV Push every 4 hours PRN Moderate Pain (4 - 6)  morphine  - Injectable 4 milliGRAM(s) IV Push every 4 hours PRN Severe Pain (7 - 10)  ondansetron Injectable 4 milliGRAM(s) IV Push every 8 hours PRN Nausea and/or Vomiting      LABS:                        10.3   6.19  )-----------( 212      ( 22 Jan 2022 06:41 )             31.0     01-22    142  |  101  |  12.2  ----------------------------<  105<H>  3.6   |  28.0  |  0.71    Ca    9.2      22 Jan 2022 06:41  Phos  2.2     01-22  Mg     1.7     01-22    TPro  6.3<L>  /  Alb  3.1<L>  /  TBili  1.0  /  DBili  x   /  AST  72<H>  /  ALT  120<H>  /  AlkPhos  197<H>  01-22    CAPILLARY BLOOD GLUCOSE      POCT Blood Glucose.: 215 mg/dL (22 Jan 2022 16:47)  POCT Blood Glucose.: 101 mg/dL (22 Jan 2022 07:47)    PHYSICAL EXAM:    GENERAL: elderly female, laying in bed, NAD  HEAD:  Atraumatic, Normocephalic  EYES: EOMI, PERRLA, conjunctiva and sclera clear  ENMT: Moist mucous membranes  NECK: Supple  NERVOUS SYSTEM:  Alert & Oriented X3, Motor Strength 5/5 B/L upper and lower extremities   CHEST/LUNG: Clear to auscultation bilaterally   HEART: Regular rate and rhythm, + S1/S2  ABDOMEN: Soft, generalized tenderness/discomfort, obese; Bowel sounds present, no rebound or guarding  EXTREMITIES:  no pedal edema

## 2022-01-22 NOTE — PROGRESS NOTE ADULT - REASON FOR ADMISSION
AMS 2/2 Acute on Chronic Pancreatitis

## 2022-01-22 NOTE — PROGRESS NOTE ADULT - ASSESSMENT
71 yr old female with hypertension, hyperlipidemia, chronic lower back pain, chronic pancreatitis with pancreatic insufficiency, GERD presented with complaints of abdominal pain, nausea, vomiting, altered mental status. Patient noted to be febrile, confused, labs revealed elevated lipase, WBC and LFTs. CT abdomen was done revealed acute pancreatitis without necrosis. She was given IV Tylenol, fluids and pain control. Cultures were sent. Her blood cultures were positive for E coli, Azactam was added. Patient was noted to have elevated LFTs, GI evaluation was requested, for possible MRCP. Antibiotics changed to Meropenem. She underwent ERCP with biliary and pancreatic stent placement. Repeat blood cultures were negative. Diet was advanced.    1. E coli bacteremia due to cholangitis  sepsis present on admission, but resolved  s/p ERCP and biliary and pancreatic stent placement  repeat blood cultures negative  continue Meropenem and switch to Cipro on discharge to complete 2 week course of abx  Outpatient GI follow up on discharge for repeat cholangiogram and stent removal.     2. Metabolic encephalopathy due to sepsis - resolved  Present on admission, now resolved    3. Acute pancreatitis  abdominal pain slowly improving  amylase/lipase downtrending  advance diet  continue PPI    4. Transaminitis due to Cholangitis  improving s/p biliary stent  trend LFTs  outpatient follow up with Dr. Miller    5. Diabetes mellitus  HbA1c 6.8  Sugars labile  Continue Lantus and sliding scale coverage.    6. Hypertension  BP remains uncontrolled  Continue Metoprolol  Increase Lisinopril to 40 mg daily  Add PRN Hydralazine  Low sodium diet    7. Glaucoma  -continue current eye drops  -outpatient opthalmology follow up    8. Hypomagnesemia and Hypophosphatemia  -supplement electrolytes  -repeat BMP in AM    DVT ppx -Lovenox     Dispo- Discharge home on 1/23. Patient reluctant to be discharged today given upgrade of diet.    Plan discussed with and patient and RN at bedside

## 2022-01-22 NOTE — PROGRESS NOTE ADULT - PROVIDER SPECIALTY LIST ADULT
Hospitalist
Hospitalist
Gastroenterology
Infectious Disease
Gastroenterology
Hospitalist
Gastroenterology

## 2022-01-23 ENCOUNTER — TRANSCRIPTION ENCOUNTER (OUTPATIENT)
Age: 72
End: 2022-01-23

## 2022-01-23 VITALS
SYSTOLIC BLOOD PRESSURE: 148 MMHG | TEMPERATURE: 99 F | RESPIRATION RATE: 18 BRPM | DIASTOLIC BLOOD PRESSURE: 72 MMHG | OXYGEN SATURATION: 97 % | HEART RATE: 82 BPM

## 2022-01-23 LAB
ALBUMIN SERPL ELPH-MCNC: 3.1 G/DL — LOW (ref 3.3–5.2)
ALP SERPL-CCNC: 168 U/L — HIGH (ref 40–120)
ALT FLD-CCNC: 94 U/L — HIGH
ANION GAP SERPL CALC-SCNC: 10 MMOL/L — SIGNIFICANT CHANGE UP (ref 5–17)
AST SERPL-CCNC: 53 U/L — HIGH
BASOPHILS # BLD AUTO: 0.03 K/UL — SIGNIFICANT CHANGE UP (ref 0–0.2)
BASOPHILS NFR BLD AUTO: 0.4 % — SIGNIFICANT CHANGE UP (ref 0–2)
BILIRUB SERPL-MCNC: 0.7 MG/DL — SIGNIFICANT CHANGE UP (ref 0.4–2)
BUN SERPL-MCNC: 11.9 MG/DL — SIGNIFICANT CHANGE UP (ref 8–20)
CALCIUM SERPL-MCNC: 9 MG/DL — SIGNIFICANT CHANGE UP (ref 8.6–10.2)
CHLORIDE SERPL-SCNC: 104 MMOL/L — SIGNIFICANT CHANGE UP (ref 98–107)
CO2 SERPL-SCNC: 29 MMOL/L — SIGNIFICANT CHANGE UP (ref 22–29)
CREAT SERPL-MCNC: 0.78 MG/DL — SIGNIFICANT CHANGE UP (ref 0.5–1.3)
CULTURE RESULTS: SIGNIFICANT CHANGE UP
CULTURE RESULTS: SIGNIFICANT CHANGE UP
EOSINOPHIL # BLD AUTO: 0.16 K/UL — SIGNIFICANT CHANGE UP (ref 0–0.5)
EOSINOPHIL NFR BLD AUTO: 2.2 % — SIGNIFICANT CHANGE UP (ref 0–6)
GLUCOSE BLDC GLUCOMTR-MCNC: 127 MG/DL — HIGH (ref 70–99)
GLUCOSE BLDC GLUCOMTR-MCNC: 148 MG/DL — HIGH (ref 70–99)
GLUCOSE BLDC GLUCOMTR-MCNC: 207 MG/DL — HIGH (ref 70–99)
GLUCOSE SERPL-MCNC: 157 MG/DL — HIGH (ref 70–99)
HCT VFR BLD CALC: 29 % — LOW (ref 34.5–45)
HGB BLD-MCNC: 9.7 G/DL — LOW (ref 11.5–15.5)
IMM GRANULOCYTES NFR BLD AUTO: 1.5 % — SIGNIFICANT CHANGE UP (ref 0–1.5)
LIDOCAIN IGE QN: 55 U/L — HIGH (ref 22–51)
LYMPHOCYTES # BLD AUTO: 1.71 K/UL — SIGNIFICANT CHANGE UP (ref 1–3.3)
LYMPHOCYTES # BLD AUTO: 23.9 % — SIGNIFICANT CHANGE UP (ref 13–44)
MAGNESIUM SERPL-MCNC: 2.1 MG/DL — SIGNIFICANT CHANGE UP (ref 1.6–2.6)
MCHC RBC-ENTMCNC: 30.4 PG — SIGNIFICANT CHANGE UP (ref 27–34)
MCHC RBC-ENTMCNC: 33.4 GM/DL — SIGNIFICANT CHANGE UP (ref 32–36)
MCV RBC AUTO: 90.9 FL — SIGNIFICANT CHANGE UP (ref 80–100)
MONOCYTES # BLD AUTO: 0.85 K/UL — SIGNIFICANT CHANGE UP (ref 0–0.9)
MONOCYTES NFR BLD AUTO: 11.9 % — SIGNIFICANT CHANGE UP (ref 2–14)
NEUTROPHILS # BLD AUTO: 4.28 K/UL — SIGNIFICANT CHANGE UP (ref 1.8–7.4)
NEUTROPHILS NFR BLD AUTO: 60.1 % — SIGNIFICANT CHANGE UP (ref 43–77)
PHOSPHATE SERPL-MCNC: 2.7 MG/DL — SIGNIFICANT CHANGE UP (ref 2.4–4.7)
PLATELET # BLD AUTO: 239 K/UL — SIGNIFICANT CHANGE UP (ref 150–400)
POTASSIUM SERPL-MCNC: 4.2 MMOL/L — SIGNIFICANT CHANGE UP (ref 3.5–5.3)
POTASSIUM SERPL-SCNC: 4.2 MMOL/L — SIGNIFICANT CHANGE UP (ref 3.5–5.3)
PROT SERPL-MCNC: 5.7 G/DL — LOW (ref 6.6–8.7)
RBC # BLD: 3.19 M/UL — LOW (ref 3.8–5.2)
RBC # FLD: 12.3 % — SIGNIFICANT CHANGE UP (ref 10.3–14.5)
SARS-COV-2 RNA SPEC QL NAA+PROBE: SIGNIFICANT CHANGE UP
SODIUM SERPL-SCNC: 143 MMOL/L — SIGNIFICANT CHANGE UP (ref 135–145)
SPECIMEN SOURCE: SIGNIFICANT CHANGE UP
SPECIMEN SOURCE: SIGNIFICANT CHANGE UP
WBC # BLD: 7.14 K/UL — SIGNIFICANT CHANGE UP (ref 3.8–10.5)
WBC # FLD AUTO: 7.14 K/UL — SIGNIFICANT CHANGE UP (ref 3.8–10.5)

## 2022-01-23 PROCEDURE — 85025 COMPLETE CBC W/AUTO DIFF WBC: CPT

## 2022-01-23 PROCEDURE — 80076 HEPATIC FUNCTION PANEL: CPT

## 2022-01-23 PROCEDURE — 85027 COMPLETE CBC AUTOMATED: CPT

## 2022-01-23 PROCEDURE — C1769: CPT

## 2022-01-23 PROCEDURE — 80061 LIPID PANEL: CPT

## 2022-01-23 PROCEDURE — 83690 ASSAY OF LIPASE: CPT

## 2022-01-23 PROCEDURE — 0225U NFCT DS DNA&RNA 21 SARSCOV2: CPT

## 2022-01-23 PROCEDURE — 96374 THER/PROPH/DIAG INJ IV PUSH: CPT

## 2022-01-23 PROCEDURE — C2625: CPT

## 2022-01-23 PROCEDURE — 87086 URINE CULTURE/COLONY COUNT: CPT

## 2022-01-23 PROCEDURE — 85014 HEMATOCRIT: CPT

## 2022-01-23 PROCEDURE — 81001 URINALYSIS AUTO W/SCOPE: CPT

## 2022-01-23 PROCEDURE — 80048 BASIC METABOLIC PNL TOTAL CA: CPT

## 2022-01-23 PROCEDURE — 88305 TISSUE EXAM BY PATHOLOGIST: CPT

## 2022-01-23 PROCEDURE — 82962 GLUCOSE BLOOD TEST: CPT

## 2022-01-23 PROCEDURE — 85018 HEMOGLOBIN: CPT

## 2022-01-23 PROCEDURE — 82803 BLOOD GASES ANY COMBINATION: CPT

## 2022-01-23 PROCEDURE — U0003: CPT

## 2022-01-23 PROCEDURE — U0005: CPT

## 2022-01-23 PROCEDURE — 82947 ASSAY GLUCOSE BLOOD QUANT: CPT

## 2022-01-23 PROCEDURE — 85610 PROTHROMBIN TIME: CPT

## 2022-01-23 PROCEDURE — 82435 ASSAY OF BLOOD CHLORIDE: CPT

## 2022-01-23 PROCEDURE — 36415 COLL VENOUS BLD VENIPUNCTURE: CPT

## 2022-01-23 PROCEDURE — 80053 COMPREHEN METABOLIC PANEL: CPT

## 2022-01-23 PROCEDURE — 86900 BLOOD TYPING SEROLOGIC ABO: CPT

## 2022-01-23 PROCEDURE — 76705 ECHO EXAM OF ABDOMEN: CPT

## 2022-01-23 PROCEDURE — 74177 CT ABD & PELVIS W/CONTRAST: CPT | Mod: MA

## 2022-01-23 PROCEDURE — 74183 MRI ABD W/O CNTR FLWD CNTR: CPT

## 2022-01-23 PROCEDURE — 99285 EMERGENCY DEPT VISIT HI MDM: CPT | Mod: 25

## 2022-01-23 PROCEDURE — 88342 IMHCHEM/IMCYTCHM 1ST ANTB: CPT

## 2022-01-23 PROCEDURE — 96375 TX/PRO/DX INJ NEW DRUG ADDON: CPT

## 2022-01-23 PROCEDURE — 86850 RBC ANTIBODY SCREEN: CPT

## 2022-01-23 PROCEDURE — 85730 THROMBOPLASTIN TIME PARTIAL: CPT

## 2022-01-23 PROCEDURE — C2617: CPT

## 2022-01-23 PROCEDURE — 70450 CT HEAD/BRAIN W/O DYE: CPT | Mod: MA

## 2022-01-23 PROCEDURE — 84100 ASSAY OF PHOSPHORUS: CPT

## 2022-01-23 PROCEDURE — 83036 HEMOGLOBIN GLYCOSYLATED A1C: CPT

## 2022-01-23 PROCEDURE — 87186 SC STD MICRODIL/AGAR DIL: CPT

## 2022-01-23 PROCEDURE — 74329 X-RAY FOR PANCREAS ENDOSCOPY: CPT

## 2022-01-23 PROCEDURE — C1773: CPT

## 2022-01-23 PROCEDURE — 83605 ASSAY OF LACTIC ACID: CPT

## 2022-01-23 PROCEDURE — 86901 BLOOD TYPING SEROLOGIC RH(D): CPT

## 2022-01-23 PROCEDURE — 83880 ASSAY OF NATRIURETIC PEPTIDE: CPT

## 2022-01-23 PROCEDURE — 84132 ASSAY OF SERUM POTASSIUM: CPT

## 2022-01-23 PROCEDURE — 83735 ASSAY OF MAGNESIUM: CPT

## 2022-01-23 PROCEDURE — 71045 X-RAY EXAM CHEST 1 VIEW: CPT

## 2022-01-23 PROCEDURE — 82150 ASSAY OF AMYLASE: CPT

## 2022-01-23 PROCEDURE — 87150 DNA/RNA AMPLIFIED PROBE: CPT

## 2022-01-23 PROCEDURE — 93005 ELECTROCARDIOGRAM TRACING: CPT

## 2022-01-23 PROCEDURE — 87040 BLOOD CULTURE FOR BACTERIA: CPT

## 2022-01-23 PROCEDURE — 82330 ASSAY OF CALCIUM: CPT

## 2022-01-23 PROCEDURE — 84295 ASSAY OF SERUM SODIUM: CPT

## 2022-01-23 PROCEDURE — 84484 ASSAY OF TROPONIN QUANT: CPT

## 2022-01-23 PROCEDURE — 99239 HOSP IP/OBS DSCHRG MGMT >30: CPT

## 2022-01-23 RX ORDER — CIPROFLOXACIN LACTATE 400MG/40ML
1 VIAL (ML) INTRAVENOUS
Qty: 20 | Refills: 0
Start: 2022-01-23 | End: 2022-02-01

## 2022-01-23 RX ORDER — SACCHAROMYCES BOULARDII 250 MG
1 POWDER IN PACKET (EA) ORAL
Qty: 20 | Refills: 0
Start: 2022-01-23 | End: 2022-02-01

## 2022-01-23 RX ORDER — LISINOPRIL 2.5 MG/1
1 TABLET ORAL
Qty: 30 | Refills: 0
Start: 2022-01-23 | End: 2022-02-21

## 2022-01-23 RX ORDER — RAMIPRIL 5 MG
1 CAPSULE ORAL
Qty: 0 | Refills: 0 | DISCHARGE

## 2022-01-23 RX ADMIN — PANTOPRAZOLE SODIUM 40 MILLIGRAM(S): 20 TABLET, DELAYED RELEASE ORAL at 16:30

## 2022-01-23 RX ADMIN — MORPHINE SULFATE 4 MILLIGRAM(S): 50 CAPSULE, EXTENDED RELEASE ORAL at 18:27

## 2022-01-23 RX ADMIN — Medication 0.25 MILLIGRAM(S): at 16:29

## 2022-01-23 RX ADMIN — MEROPENEM 100 MILLIGRAM(S): 1 INJECTION INTRAVENOUS at 05:17

## 2022-01-23 RX ADMIN — Medication 0.25 MILLIGRAM(S): at 05:49

## 2022-01-23 RX ADMIN — MORPHINE SULFATE 4 MILLIGRAM(S): 50 CAPSULE, EXTENDED RELEASE ORAL at 09:07

## 2022-01-23 RX ADMIN — MEROPENEM 100 MILLIGRAM(S): 1 INJECTION INTRAVENOUS at 16:30

## 2022-01-23 RX ADMIN — Medication 75 MILLIGRAM(S): at 05:21

## 2022-01-23 RX ADMIN — MORPHINE SULFATE 4 MILLIGRAM(S): 50 CAPSULE, EXTENDED RELEASE ORAL at 13:30

## 2022-01-23 RX ADMIN — BRIMONIDINE TARTRATE 1 DROP(S): 2 SOLUTION/ DROPS OPHTHALMIC at 05:25

## 2022-01-23 RX ADMIN — MORPHINE SULFATE 4 MILLIGRAM(S): 50 CAPSULE, EXTENDED RELEASE ORAL at 03:50

## 2022-01-23 RX ADMIN — PANTOPRAZOLE SODIUM 40 MILLIGRAM(S): 20 TABLET, DELAYED RELEASE ORAL at 05:21

## 2022-01-23 RX ADMIN — LISINOPRIL 40 MILLIGRAM(S): 2.5 TABLET ORAL at 05:20

## 2022-01-23 RX ADMIN — MORPHINE SULFATE 4 MILLIGRAM(S): 50 CAPSULE, EXTENDED RELEASE ORAL at 04:50

## 2022-01-23 RX ADMIN — Medication 81 MILLIGRAM(S): at 11:37

## 2022-01-23 RX ADMIN — MORPHINE SULFATE 4 MILLIGRAM(S): 50 CAPSULE, EXTENDED RELEASE ORAL at 09:22

## 2022-01-23 RX ADMIN — MORPHINE SULFATE 4 MILLIGRAM(S): 50 CAPSULE, EXTENDED RELEASE ORAL at 18:12

## 2022-01-23 RX ADMIN — ENOXAPARIN SODIUM 40 MILLIGRAM(S): 100 INJECTION SUBCUTANEOUS at 11:37

## 2022-01-23 RX ADMIN — MORPHINE SULFATE 4 MILLIGRAM(S): 50 CAPSULE, EXTENDED RELEASE ORAL at 13:45

## 2022-01-23 RX ADMIN — Medication 2: at 12:53

## 2022-01-23 NOTE — DISCHARGE NOTE NURSING/CASE MANAGEMENT/SOCIAL WORK - NSDCPEFALRISK_GEN_ALL_CORE
For information on Fall & Injury Prevention, visit: [email or web address] OR  [email or web address] OR  https://www.cdc.gov/steadi/patient.html

## 2022-01-23 NOTE — DISCHARGE NOTE NURSING/CASE MANAGEMENT/SOCIAL WORK - PATIENT PORTAL LINK FT
You can access the FollowMyHealth Patient Portal offered by [facility] by registering at the following website: [email or web address]. By joining [employer]’s FollowMyHealth portal, you will also be able to view your health information using other applications (apps) compatible with our system.

## 2022-02-10 ENCOUNTER — APPOINTMENT (OUTPATIENT)
Dept: GASTROENTEROLOGY | Facility: CLINIC | Age: 72
End: 2022-02-10
Payer: MEDICARE

## 2022-02-10 DIAGNOSIS — Z09 ENCOUNTER FOR FOLLOW-UP EXAMINATION AFTER COMPLETED TREATMENT FOR CONDITIONS OTHER THAN MALIGNANT NEOPLASM: ICD-10-CM

## 2022-02-10 PROCEDURE — 99443: CPT | Mod: 95

## 2022-02-10 NOTE — HISTORY OF PRESENT ILLNESS
[de-identified] : Due to COVID 19 pandemic, telephonic visit was scheduled to decrease any chance of exposure. Verbal consent was obtained from the patient.\par The patient was at home.  I was at [facility].  The patient  was also on the phone.  The patient was admitted to [facility] with prepancreatitis and acute cholangitis.  She has a history of cholecystectomy about 20 years ago.  She was bacteremic.  EUS was performed which revealed thickening of the bile duct cholangitis.  ERCP with pancreatic and bile duct stent was placed. The patient improved with antibiotics and with the intervention.  The patient was discharged home and is feeling fine.  MRI did not reveal any mass.

## 2022-02-10 NOTE — ASSESSMENT
[FreeTextEntry1] : Most likely patient had acute cholangitis due to biliary obstruction related to the possible sludge and led to biliary pancreatitis.  Extensive discussion was performed with the patient regarding the CT results,, MRI results and ERCP findings.  The patient will need blood work and follow-up in the office.  She should have a ERCP for removal of cholangiogram in 2 months.\par \par I spent 23 minutes on the encounter\par \par Dennys Miller MD\par Gastroenterology \par \par

## 2022-03-03 LAB
ALBUMIN SERPL ELPH-MCNC: 4.1 G/DL
ALP BLD-CCNC: 120 U/L
ALT SERPL-CCNC: 26 U/L
ANION GAP SERPL CALC-SCNC: 12 MMOL/L
AST SERPL-CCNC: 27 U/L
BASOPHILS # BLD AUTO: 0.04 K/UL
BASOPHILS NFR BLD AUTO: 0.5 %
BILIRUB SERPL-MCNC: 0.4 MG/DL
BUN SERPL-MCNC: 25 MG/DL
CALCIUM SERPL-MCNC: 10 MG/DL
CANCER AG19-9 SERPL-ACNC: 28 U/ML
CHLORIDE SERPL-SCNC: 100 MMOL/L
CO2 SERPL-SCNC: 26 MMOL/L
CREAT SERPL-MCNC: 1.06 MG/DL
EGFR: 56 ML/MIN/1.73M2
EOSINOPHIL # BLD AUTO: 0.54 K/UL
EOSINOPHIL NFR BLD AUTO: 7.1 %
GLUCOSE SERPL-MCNC: 202 MG/DL
HCT VFR BLD CALC: 35.3 %
HGB BLD-MCNC: 11.2 G/DL
IMM GRANULOCYTES NFR BLD AUTO: 0.4 %
INR PPP: 0.89 RATIO
LYMPHOCYTES # BLD AUTO: 2.4 K/UL
LYMPHOCYTES NFR BLD AUTO: 31.7 %
MAN DIFF?: NORMAL
MCHC RBC-ENTMCNC: 30.2 PG
MCHC RBC-ENTMCNC: 31.7 GM/DL
MCV RBC AUTO: 95.1 FL
MONOCYTES # BLD AUTO: 0.44 K/UL
MONOCYTES NFR BLD AUTO: 5.8 %
NEUTROPHILS # BLD AUTO: 4.11 K/UL
NEUTROPHILS NFR BLD AUTO: 54.5 %
PLATELET # BLD AUTO: 342 K/UL
POTASSIUM SERPL-SCNC: 4.7 MMOL/L
PROT SERPL-MCNC: 6.8 G/DL
PT BLD: 10.4 SEC
RBC # BLD: 3.71 M/UL
RBC # FLD: 12.3 %
SODIUM SERPL-SCNC: 139 MMOL/L
WBC # FLD AUTO: 7.56 K/UL

## 2022-03-07 ENCOUNTER — LABORATORY RESULT (OUTPATIENT)
Age: 72
End: 2022-03-07

## 2022-03-10 ENCOUNTER — RESULT REVIEW (OUTPATIENT)
Age: 72
End: 2022-03-10

## 2022-03-10 ENCOUNTER — OUTPATIENT (OUTPATIENT)
Dept: OUTPATIENT SERVICES | Facility: HOSPITAL | Age: 72
LOS: 1 days | End: 2022-03-10
Payer: MEDICARE

## 2022-03-10 ENCOUNTER — APPOINTMENT (OUTPATIENT)
Dept: GASTROENTEROLOGY | Facility: HOSPITAL | Age: 72
End: 2022-03-10

## 2022-03-10 ENCOUNTER — TRANSCRIPTION ENCOUNTER (OUTPATIENT)
Age: 72
End: 2022-03-10

## 2022-03-10 DIAGNOSIS — Z98.49 CATARACT EXTRACTION STATUS, UNSPECIFIED EYE: Chronic | ICD-10-CM

## 2022-03-10 DIAGNOSIS — Z90.49 ACQUIRED ABSENCE OF OTHER SPECIFIED PARTS OF DIGESTIVE TRACT: Chronic | ICD-10-CM

## 2022-03-10 DIAGNOSIS — Z98.89 OTHER SPECIFIED POSTPROCEDURAL STATES: Chronic | ICD-10-CM

## 2022-03-10 DIAGNOSIS — K85.90 ACUTE PANCREATITIS WITHOUT NECROSIS OR INFECTION, UNSPECIFIED: ICD-10-CM

## 2022-03-10 LAB — GLUCOSE BLDC GLUCOMTR-MCNC: 157 MG/DL — HIGH (ref 70–99)

## 2022-03-10 PROCEDURE — C1769: CPT

## 2022-03-10 PROCEDURE — 43239 EGD BIOPSY SINGLE/MULTIPLE: CPT | Mod: 59

## 2022-03-10 PROCEDURE — 88342 IMHCHEM/IMCYTCHM 1ST ANTB: CPT

## 2022-03-10 PROCEDURE — C1773: CPT

## 2022-03-10 PROCEDURE — 74328 X-RAY BILE DUCT ENDOSCOPY: CPT

## 2022-03-10 PROCEDURE — 43239 EGD BIOPSY SINGLE/MULTIPLE: CPT

## 2022-03-10 PROCEDURE — 88305 TISSUE EXAM BY PATHOLOGIST: CPT

## 2022-03-10 PROCEDURE — 43275 ERCP REMOVE FORGN BODY DUCT: CPT

## 2022-03-10 PROCEDURE — 88342 IMHCHEM/IMCYTCHM 1ST ANTB: CPT | Mod: 26

## 2022-03-10 PROCEDURE — 88305 TISSUE EXAM BY PATHOLOGIST: CPT | Mod: 26

## 2022-03-10 PROCEDURE — 82962 GLUCOSE BLOOD TEST: CPT

## 2022-03-10 DEVICE — HYDRATOME 44: Type: IMPLANTABLE DEVICE | Status: FUNCTIONAL

## 2022-03-10 DEVICE — CATH BLLN BIL RX 9-12CM: Type: IMPLANTABLE DEVICE | Status: FUNCTIONAL

## 2022-03-10 RX ORDER — INDOMETHACIN 50 MG
100 CAPSULE ORAL ONCE
Refills: 0 | Status: DISCONTINUED | OUTPATIENT
Start: 2022-03-10 | End: 2022-03-24

## 2022-03-10 RX ORDER — CIPROFLOXACIN LACTATE 400MG/40ML
400 VIAL (ML) INTRAVENOUS ONCE
Refills: 0 | Status: DISCONTINUED | OUTPATIENT
Start: 2022-03-10 | End: 2022-03-24

## 2022-03-10 NOTE — PHYSICAL EXAM
[General Appearance - Alert] : alert [General Appearance - In No Acute Distress] : in no acute distress [Sclera] : the sclera and conjunctiva were normal [PERRL With Normal Accommodation] : pupils were equal in size, round, and reactive to light [Outer Ear] : the ears and nose were normal in appearance [Extraocular Movements] : extraocular movements were intact [Oropharynx] : the oropharynx was normal [Neck Appearance] : the appearance of the neck was normal [Neck Cervical Mass (___cm)] : no neck mass was observed [Jugular Venous Distention Increased] : there was no jugular-venous distention [Thyroid Diffuse Enlargement] : the thyroid was not enlarged [Thyroid Nodule] : there were no palpable thyroid nodules [Auscultation Breath Sounds / Voice Sounds] : lungs were clear to auscultation bilaterally [Heart Rate And Rhythm] : heart rate was normal and rhythm regular [Heart Sounds Gallop] : no gallops [Heart Sounds] : normal S1 and S2 [Murmurs] : no murmurs [Heart Sounds Pericardial Friction Rub] : no pericardial rub [Bowel Sounds] : normal bowel sounds [Abdomen Soft] : soft [Abdomen Tenderness] : non-tender [] : no hepato-splenomegaly [Abdomen Mass (___ Cm)] : no abdominal mass palpated

## 2022-03-15 LAB — SURGICAL PATHOLOGY STUDY: SIGNIFICANT CHANGE UP

## 2022-04-01 ENCOUNTER — APPOINTMENT (OUTPATIENT)
Dept: GASTROENTEROLOGY | Facility: CLINIC | Age: 72
End: 2022-04-01
Payer: MEDICARE

## 2022-04-01 VITALS
BODY MASS INDEX: 24.17 KG/M2 | RESPIRATION RATE: 14 BRPM | OXYGEN SATURATION: 98 % | HEART RATE: 76 BPM | WEIGHT: 128 LBS | HEIGHT: 61 IN | SYSTOLIC BLOOD PRESSURE: 118 MMHG | DIASTOLIC BLOOD PRESSURE: 78 MMHG

## 2022-04-01 DIAGNOSIS — K85.10 BILIARY ACUTE PANCREATITIS WITHOUT NECROSIS OR INFECTION: ICD-10-CM

## 2022-04-01 DIAGNOSIS — Z87.19 PERSONAL HISTORY OF OTHER DISEASES OF THE DIGESTIVE SYSTEM: ICD-10-CM

## 2022-04-01 DIAGNOSIS — D64.9 ANEMIA, UNSPECIFIED: ICD-10-CM

## 2022-04-01 DIAGNOSIS — R11.2 NAUSEA WITH VOMITING, UNSPECIFIED: ICD-10-CM

## 2022-04-01 DIAGNOSIS — F11.90 OPIOID USE, UNSPECIFIED, UNCOMPLICATED: ICD-10-CM

## 2022-04-01 PROCEDURE — 99215 OFFICE O/P EST HI 40 MIN: CPT

## 2022-04-01 RX ORDER — BLOOD SUGAR DIAGNOSTIC
STRIP MISCELLANEOUS
Qty: 7 | Refills: 1 | Status: DISCONTINUED | COMMUNITY
Start: 2019-08-22 | End: 2022-04-01

## 2022-04-01 NOTE — HISTORY OF PRESENT ILLNESS
[de-identified] : 71-year-old woman who has history of hypertension, dyslipidemia, chronic low back pain and has been on oxycodone 4 times a day, chronic pancreatitis with pancreatic insufficiency and was on pancreatic enzymes, reflux and had presented to [facility] in January for abdominal pain, nausea, vomiting, altered mental status, fever.  She was noted to have acute pancreatitis.  She was noted to have E. coli bacteremia.  She underwent EUS/ERCP with biliary and pancreatic duct stent placement and improved.  She improved after the ERCP and subsequently was discharged home.  Then subsequently she has undergoneEGD/a ERCP on March 10, 2022.  EGD revealed erosive gastritis and gastric biopsies were negative for Helicobacter pylori infection.  She takes aspirin at home.  ERCP with pancreatic and bile duct stent removal was performed.  Cholangiogram was unremarkable.\par \par Today, we had extensive discussion.  Patient has history of pancreatitis twice in the past but no clear source was noted.  That is why she was placed on pancreatic enzyme supplementation by her PCP.  She also has a history of anemia and had been on vitamin B12.  She has never undergone any colonoscopy in the past.  She has been taking narcotics on a regular basis 4 times a day for her back pain.  She denies any constipation issue.  Her hemoglobin is 11.2.  It is a normocytic normochromic indices.\par

## 2022-04-01 NOTE — PHYSICAL EXAM
[General Appearance - Alert] : alert [General Appearance - In No Acute Distress] : in no acute distress [Sclera] : the sclera and conjunctiva were normal [PERRL With Normal Accommodation] : pupils were equal in size, round, and reactive to light [Extraocular Movements] : extraocular movements were intact [Outer Ear] : the ears and nose were normal in appearance [Oropharynx] : the oropharynx was normal [Neck Appearance] : the appearance of the neck was normal [Neck Cervical Mass (___cm)] : no neck mass was observed [Jugular Venous Distention Increased] : there was no jugular-venous distention [Thyroid Diffuse Enlargement] : the thyroid was not enlarged [Thyroid Nodule] : there were no palpable thyroid nodules [Auscultation Breath Sounds / Voice Sounds] : lungs were clear to auscultation bilaterally [Heart Rate And Rhythm] : heart rate was normal and rhythm regular [Heart Sounds] : normal S1 and S2 [Heart Sounds Gallop] : no gallops [Murmurs] : no murmurs [Heart Sounds Pericardial Friction Rub] : no pericardial rub [Bowel Sounds] : normal bowel sounds [Abdomen Soft] : soft [Abdomen Tenderness] : non-tender [Abdomen Mass (___ Cm)] : no abdominal mass palpated [No CVA Tenderness] : no ~M costovertebral angle tenderness [No Spinal Tenderness] : no spinal tenderness [Abnormal Walk] : normal gait [Nail Clubbing] : no clubbing  or cyanosis of the fingernails [Musculoskeletal - Swelling] : no joint swelling seen [Motor Tone] : muscle strength and tone were normal [Skin Color & Pigmentation] : normal skin color and pigmentation [Skin Turgor] : normal skin turgor [] : no rash [No Focal Deficits] : no focal deficits [Oriented To Time, Place, And Person] : oriented to person, place, and time [Affect] : the affect was normal [Impaired Insight] : insight and judgment were intact

## 2022-04-01 NOTE — ASSESSMENT
[FreeTextEntry1] : Anemia: There is erosive gastritis noticed.  We do not have any iron, vitamin B12 or folate studies.  We will perform that.  If there is any particular deficiency, we will replete that.  As she never had a colonoscopy before I have suggested to undergo the colonoscopy.\par \par Recurrent pancreatitis: Most likely the patient had biliary pancreatitis which seems to have resolved.  She was on pancreatic enzyme supplementation which have recommended to stop.  I have also recommended to perform fecal elastase test.\par \par Nausea and vomiting: She has been taking narcotics.  I have recommended to cut down on the narcotics and discuss with her pain management doctor.  If she has to take any NSAIDs, she should take a PPI therapy on a regular basis.\par \par She should discuss with her cardiologist regarding the need for aspirin.  Extensive time was spent on patient and her  questions and counseling.  All the questions were answered.  We will follow-up in 2 months.

## 2022-05-02 NOTE — PROGRESS NOTE ADULT - SUBJECTIVE AND OBJECTIVE BOX
Patient seen and examined; chart reviewed.  X rays reviewed with Dr Spencer.    Feels progressively better.  Denies N/V.  Still with some persistent upper abdominal pain.  Taking some Narcs with relief.  no Fever or chills or jaundice.  Eating sparingly as afraid to eat.  No recent BM.  Ambulates to Toilet only.    MRCP Negative for CBD stone.  Absent GB.  MRI of abd:  Normal pancreas and normal PD.      PAST MEDICAL & SURGICAL HISTORY:  Pancreatitis  Herniated lumbar intervertebral disc  Endogenous hyperlipemia  HTN (hypertension)  Diabetes  Status post cataract extraction: left eye 8/3/2015  S/P tonsillectomy  S/P cholecystectomy  S/P appendectomy      ROS:  No Heartburn, regurgitation, dysphagia, odynophagia.  No dyspepsia  No abdominal pain.    No Nausea, vomiting.  No Bleeding.  No hematemesis.   No diarrhea.    No hematochesia.  No weight loss, anorexia.  No edema.      MEDICATIONS  (STANDING):  atorvastatin 10 milliGRAM(s) Oral at bedtime  chlorhexidine 2% Cloths 1 Application(s) Topical daily  dextrose 5%. 1000 milliLiter(s) (50 mL/Hr) IV Continuous <Continuous>  dextrose 50% Injectable 12.5 Gram(s) IV Push once  dextrose 50% Injectable 25 Gram(s) IV Push once  dextrose 50% Injectable 25 Gram(s) IV Push once  enoxaparin Injectable 40 milliGRAM(s) SubCutaneous daily  influenza   Vaccine 0.5 milliLiter(s) IntraMuscular once  insulin lispro (HumaLOG) corrective regimen sliding scale   SubCutaneous every 6 hours  metFORMIN 1000 milliGRAM(s) Oral two times a day  metoprolol tartrate 25 milliGRAM(s) Oral two times a day  pantoprazole  Injectable 40 milliGRAM(s) IV Push every 12 hours    MEDICATIONS  (PRN):  dextrose 40% Gel 15 Gram(s) Oral once PRN Blood Glucose LESS THAN 70 milliGRAM(s)/deciliter  glucagon  Injectable 1 milliGRAM(s) IntraMuscular once PRN Glucose LESS THAN 70 milligrams/deciliter  HYDROmorphone  Injectable 0.5 milliGRAM(s) IV Push every 4 hours PRN Breakthrough  ondansetron Injectable 4 milliGRAM(s) IV Push every 6 hours PRN Nausea and/or Vomiting  oxyCODONE    IR 5 milliGRAM(s) Oral every 6 hours PRN Moderate Pain (4 - 6)  oxyCODONE    IR 10 milliGRAM(s) Oral every 6 hours PRN Severe Pain (7 - 10)      Allergies  Keflex (Anaphylaxis)  penicillin (Anaphylaxis)    Vital Signs Last 24 Hrs  T(C): 37 (14 Apr 2019 07:00), Max: 37.6 (14 Apr 2019 00:29)  T(F): 98.6 (14 Apr 2019 07:00), Max: 99.7 (14 Apr 2019 00:29)  HR: 64 (14 Apr 2019 07:00) (64 - 84)  BP: 148/76 (14 Apr 2019 07:00) (148/76 - 151/81)  BP(mean): --  RR: 18 (14 Apr 2019 07:00) (18 - 18)  SpO2: 98% (14 Apr 2019 07:00) (95% - 98%)    PHYSICAL EXAM:    GENERAL: NAD, well-groomed, well-developed  HEAD:  Atraumatic, Normocephalic  EYES: EOMI, PERRLA, conjunctiva and sclera clear  ENMT: No tonsillar erythema, exudates, or enlargement; Moist mucous membranes, Good dentition, No lesions  NECK: Supple, No JVD, Normal thyroid  CHEST/LUNG: Clear to percussion bilaterally; No rales, rhonchi, wheezing, or rubs  HEART: Regular rate and rhythm; No murmurs, rubs, or gallops  ABDOMEN: Soft, Nontender, Nondistended; Bowel sounds present,  No HSM.  No MTR.    EXTREMITIES:  2+ Peripheral Pulses, No clubbing, cyanosis, or edema  LYMPH: No lymphadenopathy noted  SKIN: No rashes or lesions      LABS:                        9.5    5.4   )-----------( 214      ( 14 Apr 2019 07:26 )             28.4     04-14    139  |  102  |  7.0<L>  ----------------------------<  128<H>  3.8   |  24.0  |  0.72    Ca    9.4      14 Apr 2019 07:26  Phos  3.3     04-14  Mg     1.8     04-14    TPro  5.7<L>  /  Alb  3.2<L>  /  TBili  0.6  /  DBili  0.3  /  AST  34<H>  /  ALT  91<H>  /  AlkPhos  134<H>  04-13             RADIOLOGY & ADDITIONAL STUDIES: Oral Minoxidil Pregnancy And Lactation Text: This medication should only be used when clearly needed if you are pregnant, attempting to become pregnant or breast feeding.

## 2022-05-03 NOTE — PHYSICAL THERAPY INITIAL EVALUATION ADULT - CAPILLARY REFILL, LUE
less than/equal to 3 secs Advancement-Rotation Flap Text: The defect edges were debeveled with a #15 scalpel blade.  Given the location of the defect, shape of the defect and the proximity to free margins an advancement-rotation flap was deemed most appropriate.  Using a sterile surgical marker, an appropriate flap was drawn incorporating the defect and placing the expected incisions within the relaxed skin tension lines where possible. The area thus outlined was incised deep to adipose tissue with a #15 scalpel blade.  The skin margins were undermined to an appropriate distance in all directions utilizing iris scissors.

## 2022-12-10 ENCOUNTER — EMERGENCY (EMERGENCY)
Facility: HOSPITAL | Age: 72
LOS: 1 days | Discharge: HOME | End: 2022-12-10
Attending: EMERGENCY MEDICINE
Payer: MEDICARE

## 2022-12-10 VITALS
DIASTOLIC BLOOD PRESSURE: 68 MMHG | WEIGHT: 136.91 LBS | TEMPERATURE: 98 F | RESPIRATION RATE: 20 BRPM | HEIGHT: 61 IN | SYSTOLIC BLOOD PRESSURE: 149 MMHG | OXYGEN SATURATION: 96 % | HEART RATE: 83 BPM

## 2022-12-10 DIAGNOSIS — Z90.49 ACQUIRED ABSENCE OF OTHER SPECIFIED PARTS OF DIGESTIVE TRACT: Chronic | ICD-10-CM

## 2022-12-10 DIAGNOSIS — Z98.89 OTHER SPECIFIED POSTPROCEDURAL STATES: Chronic | ICD-10-CM

## 2022-12-10 DIAGNOSIS — Z98.49 CATARACT EXTRACTION STATUS, UNSPECIFIED EYE: Chronic | ICD-10-CM

## 2022-12-10 LAB
ANION GAP SERPL CALC-SCNC: 9 MMOL/L — SIGNIFICANT CHANGE UP (ref 5–17)
BUN SERPL-MCNC: 29 MG/DL — HIGH (ref 8–20)
CALCIUM SERPL-MCNC: 9.9 MG/DL — SIGNIFICANT CHANGE UP (ref 8.4–10.5)
CHLORIDE SERPL-SCNC: 100 MMOL/L — SIGNIFICANT CHANGE UP (ref 96–108)
CO2 SERPL-SCNC: 30 MMOL/L — HIGH (ref 22–29)
CREAT SERPL-MCNC: 1.33 MG/DL — HIGH (ref 0.5–1.3)
EGFR: 43 ML/MIN/1.73M2 — LOW
GLUCOSE SERPL-MCNC: 336 MG/DL — HIGH (ref 70–99)
POTASSIUM SERPL-MCNC: 5.1 MMOL/L — SIGNIFICANT CHANGE UP (ref 3.5–5.3)
POTASSIUM SERPL-SCNC: 5.1 MMOL/L — SIGNIFICANT CHANGE UP (ref 3.5–5.3)
SODIUM SERPL-SCNC: 139 MMOL/L — SIGNIFICANT CHANGE UP (ref 135–145)

## 2022-12-10 PROCEDURE — 36415 COLL VENOUS BLD VENIPUNCTURE: CPT

## 2022-12-10 PROCEDURE — 99283 EMERGENCY DEPT VISIT LOW MDM: CPT

## 2022-12-10 PROCEDURE — 99284 EMERGENCY DEPT VISIT MOD MDM: CPT

## 2022-12-10 PROCEDURE — 93010 ELECTROCARDIOGRAM REPORT: CPT

## 2022-12-10 PROCEDURE — 93005 ELECTROCARDIOGRAM TRACING: CPT

## 2022-12-10 PROCEDURE — 80048 BASIC METABOLIC PNL TOTAL CA: CPT

## 2022-12-10 NOTE — ED ADULT NURSE NOTE - OBJECTIVE STATEMENT
Pt. A&Ox4, states was sent to ED by the PCP for elevated potassium level. Pt. denies of any symptoms, chest pain/ cramps/ N/V or any issues.

## 2022-12-10 NOTE — ED PROVIDER NOTE - OBJECTIVE STATEMENT
71 y/o F with PMH HTN (on ramapril), DM, HLD, pancreatitis presents for elevated potassium on outpatient labs of 6.7, instructed to come to the ED for repeat labs. Patient states she has been drinking a lot of gatorade, but not a lot of water. Otherwise feels well.

## 2022-12-10 NOTE — ED PROVIDER NOTE - PATIENT PORTAL LINK FT
You can access the FollowMyHealth Patient Portal offered by [facility] by registering at the following website: [email or web address]. By joining [facility]’s FollowMyHealth portal, you will also be able to view your health information using other applications (apps) compatible with our system.

## 2022-12-10 NOTE — ED PROVIDER NOTE - CARE PLAN
Principal Discharge DX:	TIMOTHY (acute kidney injury)  Secondary Diagnosis:	Feared condition not demonstrated   1

## 2022-12-10 NOTE — ED ADULT NURSE NOTE - NSIMPLEMENTINTERV_GEN_ALL_ED
Implemented All Universal Safety Interventions:  [place] to call system. Call bell, personal items and telephone within reach. Instruct patient to call for assistance. Room bathroom lighting operational. Non-slip footwear when patient is off stretcher. Physically safe environment: no spills, clutter or unnecessary equipment. Stretcher in lowest position, wheels locked, appropriate side rails in place.

## 2022-12-10 NOTE — ED PROVIDER NOTE - NSFOLLOWUPINSTRUCTIONS_ED_ALL_ED_FT
- STAY HYDRATED! DRINK  OZ OF WATER DAILY.  - TALK TO YOUR DOCTOR ABOUT CHANGING OR STOPPING RAMAPRIL DUE TO RISING KIDNEY FUNCTION.  - FOLLOW UP WITH YOUR PRIMARY DOCTOR FOR REPEAT BLOOD WORK.      Hyperkalemia      Hyperkalemia is when you have too much of a mineral called potassium in your blood. If there is too much potassium in your blood, it can affect how your heart works. Potassium is normally removed from your body by your kidneys.      What are the causes?  •Taking in too much potassium. This can happen if:  •You use salt substitutes.      •You take potassium supplements.      •You eat too many foods that are high in potassium if you have kidney disease.      •Your body not being able to get rid of potassium. This can happen if:  •Your kidneys are not working properly.      •You are taking certain medicines.      •You have a condition called Houston's disease.      •You have kidney stones.      •You are on treatment to clean your blood (dialysis) and you skip a treatment.      •Your cells releasing a high amount of potassium into the blood. This can happen if:  •You have a muscle injury.      •You have very bad burns or infections.      •You have problems with your blood plasma. This can be caused by diabetes that is not well controlled.          What increases the risk?    •Drinking too much alcohol.      •Using drugs a lot.        What are the signs or symptoms?    In many cases, there are no symptoms. But, when your potassium level becomes high enough, you may have symptoms such as:  •An irregular heartbeat.      •A very slow heartbeat.      •Feeling like you may vomit (nauseous).      •Tiredness.      •Confusion.      •Tingling of your skin.      •Numbness of your hands or feet.      •Muscle cramps.      •Muscle weakness.      •Not being able to move (paralysis).        How is this treated?    Treatment depends on how bad your condition is. Treatment may need to be done in the hospital. It may include:  •Receiving a fluid with sugar (glucose) in it through an IV tube, along with insulin.      •Taking medicines.      •Having treatment to clean your blood.      •Taking calcium.        Follow these instructions at home:  A prescription pill bottle with an example of a pill.   •Take over-the-counter and prescription medicines only as told by your doctor.    • Do not take any of the following unless your doctor says it is okay:  •Supplements.      •Natural food products.      •Herbs.      •Vitamins.        •If you drink alcohol, limit how much you have as told by your doctor.      • Do not use illegal drugs. If you need help quitting, ask your doctor.      •If you have kidney disease, you may need to follow a low-potassium diet. A food expert (dietitian) can help you.      •Keep all follow-up visits.        Contact a doctor if:    •Your heartbeat is not regular or is very slow.      •You feel dizzy.      •You feel weak.      •You feel like you may vomit.      •You have tingling in your hands or feet.      •You lose feeling in your hands or feet.        Get help right away if:    •You are short of breath.      •You have chest pain.      •You faint.      •You cannot move your muscles.      These symptoms may be an emergency. Get help right away. Call 911.   • Do not wait to see if the symptoms will go away.       • Do not drive yourself to the hospital.         Summary    •Hyperkalemia is when you have too much potassium in your blood.      •Take over-the-counter and prescription medicines only as told by your doctor.      •Limit how much alcohol you have as told by your doctor.      •Contact a doctor if your heartbeat is not regular.      This information is not intended to replace advice given to you by your health care provider. Make sure you discuss any questions you have with your health care provider.

## 2022-12-10 NOTE — ED ADULT TRIAGE NOTE - AS TEMP SITE
Warfarin/Coumadin - Compliance/Warfarin/Coumadin - Dietary Advice/Warfarin/Coumadin - Follow up monitoring/Warfarin/Coumadin - Potential for adverse drug reactions and interactions oral

## 2022-12-10 NOTE — ED PROVIDER NOTE - CLINICAL SUMMARY MEDICAL DECISION MAKING FREE TEXT BOX
71 y/o F with PMh HTN, DM presents for elevated potassium on outpatient labs - EKG without signs of hyperkalemia. Repeat labs show potassium of 5.1 with cre of 1.33 (previous was 1.09 in March). I shared results with patient and , recommended PO hydration, discussion with PMD regarding stopping ramapril, signs and symptoms for hypo and hyperkalemia - recommened follow up with PMD for repeat labs.

## 2023-03-02 NOTE — ED ADULT TRIAGE NOTE - CHIEF COMPLAINT QUOTE
Anesthesia Pre Eval Note    Anesthesia ROS/Med Hx    Overall Review:  EKG was reviewed     Anesthetic Complication History:  Patient does not have a history of anesthetic complications      Pulmonary Review:  Patient does not have a pulmonary history      Neuro/Psych Review:    Positive for psychiatric history    Cardiovascular Review:    Positive for hyperlipidemia    GI/HEPATIC/RENAL Review:    Positive for GERD    End/Other Review:    Positive for obesity class III - 40.00 - 49.99  Additional Results:     ALLERGIES:   -- Gluten Meal   (Food Or Med) -- GI UPSET    --  Patient states, \"Possibel Celiac disease. Get GI             upset and stomach pains when I eat gluten.\"   -- Famotidine -- HIVES       Last Labs        Component                Value               Date/Time                  WBC                      10.5                02/23/2023 1300            RBC                      4.79                02/23/2023 1300            HGB                      13.2                02/23/2023 1300            HCT                      42.0                02/23/2023 1300            MCV                      87.7                02/23/2023 1300            MCH                      27.6                02/23/2023 1300            MCHC                     31.4 (L)            02/23/2023 1300            RDW-CV                   14.4                02/23/2023 1300            Sodium                   140                 02/23/2023 1300            Potassium                3.7                 02/23/2023 1300            Chloride                 105                 02/23/2023 1300            Carbon Dioxide           23                  02/23/2023 1300            Glucose                  99                  02/23/2023 1300            BUN                      10                  02/23/2023 1300            Creatinine               0.74                02/23/2023 1300            Glomerular Filtrati*     >90                 02/23/2023 1300             Calcium                  9.2                 02/23/2023 1300            PLT                      413                 02/23/2023 1300        Past Medical History:  No date: Anxiety  No date: Depression  No date: Disordered eating  No date: Gastroesophageal reflux disease  No date: PCOS (polycystic ovarian syndrome)  08/08/2022: Prediabetes  No date: PTSD (post-traumatic stress disorder)  No date: Tic disorder    Past Surgical History:  11/15/2022: Colonoscopy      Comment:  Screening colonoscopy in 5 years.  11/15/2022: Egd  No date: Oral surgery procedure; Bilateral       Prior to Admission medications :  Medication sertraline (ZOLOFT) 100 MG tablet, Sig Take 1 tab daily for 5 days then 1/2 tab daily for 5 days then discontinue, Start Date 3/1/23, End Date , Taking? Yes, Authorizing Provider Ashley Saxena MD    Medication QUEtiapine (SEROquel) 100 MG tablet, Sig Take 1 tablet by mouth nightly., Start Date 2/27/23, End Date , Taking? Yes, Authorizing Provider Asim Schneider MD    Medication ARIPiprazole (ABILIFY) 5 MG tablet, Sig Take 0.5 tablets by mouth daily. Dose change 02/10/2023, Start Date 2/6/23, End Date , Taking? Yes, Authorizing Provider Ashley Saxena MD    Medication Cholecalciferol (Vitamin D3) 50 mcg (2,000 units) capsule, Sig Take 2 capsules by mouth daily., Start Date 2/7/23, End Date , Taking? Yes, Authorizing Provider Cayla Early,     Medication clonazePAM (KlonoPIN) 0.5 MG tablet, Sig Take 1 tablet by mouth in the morning and 1 tablet in the evening. Do not start before January 28, 2023. Must last at least 30 days., Start Date 1/28/23, End Date 3/2/23, Taking? Yes, Authorizing Provider Ashley Saxena MD    Medication hydroxychloroquine (PLAQUENIL) 200 MG tablet, Sig START BY TAKING 1 TABLET EVERY DAY FOR 2 WEEKS THEN 1 TWICE DAILY WITH FOOD, Start Date 1/3/23, End Date , Taking? Yes, Authorizing Provider Sulema Ludwig MD    Medication omeprazole (PrilOSEC) 20 MG capsule, Sig Take 1  capsule by mouth daily., Start Date 11/29/22, End Date , Taking? Yes, Authorizing Provider PINKY Lovell    Medication desogestrel-ethinyl estradiol (APRI) 0.15-30 MG-MCG per tablet, Sig Take 1 tablet by mouth daily., Start Date 5/17/22, End Date , Taking? Yes, Authorizing Provider Sheryl Palacios MD    Medication clomiPRAMINE (ANAFRANIL) 25 MG capsule, Sig Take 1 cap at bedtime for 1 week then increase to 2 tabs at bedtime, Start Date 3/1/23, End Date , Taking? , Authorizing Provider Ashley Saxena MD         Patient Vitals in the past 24 hrs:  03/02/23 0926, Pulse:72  03/02/23 0837, BP:(!) 147/82, Temp:36 °C (96.8 °F), Temp src:Temporal, Pulse:86, Resp:18, SpO2:100 %, Height:5' 7\" (1.702 m), Weight:131.6 kg (290 lb 2 oz)      Relevant Problems   No relevant active problems       Physical Exam     Airway   Mallampati: II  TM Distance: >3 FB  Neck ROM: Full  TMJ Mobility: Good    Cardiovascular  Cardiovascular exam normal    General Assessment  General Assessment: Alert and oriented    Dental Exam  Dental exam normal  Patient has:  Denied broken/chipped/loose teeth    Pulmonary Exam  Pulmonary exam normal    Abdominal Exam    Patient Demonstrates:  Obese      Anesthesia Plan:  No phone call attempted due to:  ASA Status: 3  Anesthesia Type: General    Induction: Intravenous  Preferred Airway Type: ETT  Maintenance: Inhalational    Post-op Pain Management: Per Surgeon      Checklist  Reviewed: Lab Results, Past Med History, Allergies, Medications, Problem list, NPO Status, Beta Blocker Status, Patient Summary, Nursing Notes and EKG  Consent/Risks Discussed Statement:  The proposed anesthetic plan, including its risks and benefits, have been discussed with the Patient along with the risks and benefits of alternatives. Questions were encouraged and answered and the patient and/or representative understands and agrees to proceed.        I discussed with the patient (and/or patient's legal representative) the risks  and benefits of the proposed anesthesia plan, General, which may include services performed by other anesthesia providers.    Alternative anesthesia plans, if available, were reviewed with the patient (and/or patient's legal representative). Discussion has been held with the patient (and/or patient's legal representative) regarding risks of anesthesia, which include dental injury, eye injury, hypotension, nausea, vomiting, oral injury and sore throat and emergent situations that may require change in anesthesia plan.    The patient (and/or patient's legal representative) has indicated understanding, his/her questions have been answered, and he/she wishes to proceed with the planned anesthetic.    Blood Products: Not Anticipated     Brought to ED for AMS, LKW @1830. GCS13  Prior to AMS pt became nauseous and vomiting. Had one episode of hypoxia with EMS to an Spo2 to 70% now on NC, SPO2 in the 90s. FS high with EMS. PT brought to CC, Dr. Nguyễn at bedside.

## 2023-04-21 NOTE — DIETITIAN INITIAL EVALUATION ADULT. - WEIGHT IN KG
59 Complex Repair And Dorsal Nasal Flap Text: The defect edges were debeveled with a #15 scalpel blade.  The primary defect was closed partially with a complex linear closure.  Given the location of the remaining defect, shape of the defect and the proximity to free margins a dorsal nasal flap was deemed most appropriate for complete closure of the defect.  Using a sterile surgical marker, an appropriate flap was drawn incorporating the defect and placing the expected incisions within the relaxed skin tension lines where possible.    The area thus outlined was incised deep to adipose tissue with a #15 scalpel blade.  The skin margins were undermined to an appropriate distance in all directions utilizing iris scissors.

## 2024-05-29 ENCOUNTER — INPATIENT (INPATIENT)
Facility: HOSPITAL | Age: 74
LOS: 4 days | Discharge: HOME | DRG: 872 | End: 2024-06-03
Admitting: INTERNAL MEDICINE
Payer: MEDICARE

## 2024-05-29 VITALS
RESPIRATION RATE: 20 BRPM | SYSTOLIC BLOOD PRESSURE: 157 MMHG | HEART RATE: 102 BPM | OXYGEN SATURATION: 88 % | DIASTOLIC BLOOD PRESSURE: 81 MMHG | TEMPERATURE: 101 F

## 2024-05-29 DIAGNOSIS — Z98.49 CATARACT EXTRACTION STATUS, UNSPECIFIED EYE: Chronic | ICD-10-CM

## 2024-05-29 DIAGNOSIS — Z98.89 OTHER SPECIFIED POSTPROCEDURAL STATES: Chronic | ICD-10-CM

## 2024-05-29 DIAGNOSIS — Z90.49 ACQUIRED ABSENCE OF OTHER SPECIFIED PARTS OF DIGESTIVE TRACT: Chronic | ICD-10-CM

## 2024-05-29 LAB
ACETONE SERPL-MCNC: NEGATIVE — SIGNIFICANT CHANGE UP
APPEARANCE UR: CLEAR — SIGNIFICANT CHANGE UP
APTT BLD: 25.3 SEC — SIGNIFICANT CHANGE UP (ref 24.5–35.6)
BACTERIA # UR AUTO: NEGATIVE /HPF — SIGNIFICANT CHANGE UP
BASOPHILS # BLD AUTO: 0 K/UL — SIGNIFICANT CHANGE UP (ref 0–0.2)
BASOPHILS NFR BLD AUTO: 0 % — SIGNIFICANT CHANGE UP (ref 0–2)
BILIRUB UR-MCNC: NEGATIVE — SIGNIFICANT CHANGE UP
CAST: 2 /LPF — SIGNIFICANT CHANGE UP (ref 0–4)
COLOR SPEC: YELLOW — SIGNIFICANT CHANGE UP
DIFF PNL FLD: ABNORMAL
EOSINOPHIL # BLD AUTO: 0 K/UL — SIGNIFICANT CHANGE UP (ref 0–0.5)
EOSINOPHIL NFR BLD AUTO: 0 % — SIGNIFICANT CHANGE UP (ref 0–6)
GLUCOSE UR QL: >=1000 MG/DL
HCT VFR BLD CALC: 33.3 % — LOW (ref 34.5–45)
HGB BLD-MCNC: 11.4 G/DL — LOW (ref 11.5–15.5)
INR BLD: 0.99 RATIO — SIGNIFICANT CHANGE UP (ref 0.85–1.18)
KETONES UR-MCNC: 15 MG/DL
LACTATE BLDV-MCNC: 0.8 MMOL/L — SIGNIFICANT CHANGE UP (ref 0.5–2)
LEUKOCYTE ESTERASE UR-ACNC: NEGATIVE — SIGNIFICANT CHANGE UP
LYMPHOCYTES # BLD AUTO: 0.13 K/UL — LOW (ref 1–3.3)
LYMPHOCYTES # BLD AUTO: 0.9 % — LOW (ref 13–44)
MANUAL SMEAR VERIFICATION: SIGNIFICANT CHANGE UP
MCHC RBC-ENTMCNC: 30.4 PG — SIGNIFICANT CHANGE UP (ref 27–34)
MCHC RBC-ENTMCNC: 34.2 GM/DL — SIGNIFICANT CHANGE UP (ref 32–36)
MCV RBC AUTO: 88.8 FL — SIGNIFICANT CHANGE UP (ref 80–100)
METAMYELOCYTES # FLD: 0.9 % — HIGH (ref 0–0)
MONOCYTES # BLD AUTO: 0.65 K/UL — SIGNIFICANT CHANGE UP (ref 0–0.9)
MONOCYTES NFR BLD AUTO: 4.4 % — SIGNIFICANT CHANGE UP (ref 2–14)
NEUTROPHILS # BLD AUTO: 13.9 K/UL — HIGH (ref 1.8–7.4)
NEUTROPHILS NFR BLD AUTO: 93.8 % — HIGH (ref 43–77)
NITRITE UR-MCNC: NEGATIVE — SIGNIFICANT CHANGE UP
PH UR: 8.5 (ref 5–8)
PLAT MORPH BLD: NORMAL — SIGNIFICANT CHANGE UP
PLATELET # BLD AUTO: 232 K/UL — SIGNIFICANT CHANGE UP (ref 150–400)
PROT UR-MCNC: 100 MG/DL
PROTHROM AB SERPL-ACNC: 11 SEC — SIGNIFICANT CHANGE UP (ref 9.5–13)
RBC # BLD: 3.75 M/UL — LOW (ref 3.8–5.2)
RBC # FLD: 12 % — SIGNIFICANT CHANGE UP (ref 10.3–14.5)
RBC BLD AUTO: NORMAL — SIGNIFICANT CHANGE UP
RBC CASTS # UR COMP ASSIST: 41 /HPF — HIGH (ref 0–4)
SP GR SPEC: 1.02 — SIGNIFICANT CHANGE UP (ref 1–1.03)
SQUAMOUS # UR AUTO: 0 /HPF — SIGNIFICANT CHANGE UP (ref 0–5)
UROBILINOGEN FLD QL: 1 MG/DL — SIGNIFICANT CHANGE UP (ref 0.2–1)
WBC # BLD: 14.82 K/UL — HIGH (ref 3.8–10.5)
WBC # FLD AUTO: 14.82 K/UL — HIGH (ref 3.8–10.5)
WBC UR QL: 1 /HPF — SIGNIFICANT CHANGE UP (ref 0–5)

## 2024-05-29 PROCEDURE — 93010 ELECTROCARDIOGRAM REPORT: CPT

## 2024-05-29 PROCEDURE — 71045 X-RAY EXAM CHEST 1 VIEW: CPT | Mod: 26

## 2024-05-29 PROCEDURE — 99285 EMERGENCY DEPT VISIT HI MDM: CPT

## 2024-05-29 RX ORDER — VANCOMYCIN HCL 1 G
1000 VIAL (EA) INTRAVENOUS ONCE
Refills: 0 | Status: COMPLETED | OUTPATIENT
Start: 2024-05-29 | End: 2024-05-29

## 2024-05-29 RX ORDER — PIPERACILLIN AND TAZOBACTAM 4; .5 G/20ML; G/20ML
3.38 INJECTION, POWDER, LYOPHILIZED, FOR SOLUTION INTRAVENOUS ONCE
Refills: 0 | Status: COMPLETED | OUTPATIENT
Start: 2024-05-29 | End: 2024-05-29

## 2024-05-29 RX ORDER — ACETAMINOPHEN 500 MG
975 TABLET ORAL ONCE
Refills: 0 | Status: COMPLETED | OUTPATIENT
Start: 2024-05-29 | End: 2024-05-29

## 2024-05-29 RX ORDER — SODIUM CHLORIDE 9 MG/ML
1950 INJECTION, SOLUTION INTRAVENOUS ONCE
Refills: 0 | Status: COMPLETED | OUTPATIENT
Start: 2024-05-29 | End: 2024-05-29

## 2024-05-29 NOTE — ED PROVIDER NOTE - ATTENDING CONTRIBUTION TO CARE
Thao: I performed a face to face bedside interview with patient regarding history of present illness, review of symptoms and past medical history. I completed an independent physical exam.  I have discussed patient's plan of care with resident.   I agree with note as stated above including HISTORY OF PRESENT ILLNESS, HIV, PAST MEDICAL/SURGICAL/FAMILY/SOCIAL HISTORY, ALLERGIES AND HOME MEDICATIONS, REVIEW OF SYSTEMS, PHYSICAL EXAM, MEDICAL DECISION MAKING and any PROGRESS NOTES during the time I functioned as the attending physician for this patient unless otherwise noted. My brief assessment is as follows: 73-year-old female presenting with fever, nausea, NBNB emesis and weakness x 1 day.  Patient slightly confused on arrival.  Meets sepsis criteria.  Broad-spectrum antibiotics and sepsis workup initiated. patient initially hypoxic on room air but that improved as mental status improved after antibiotics IV fluids and antipyretics.  When  arrived gave additional story including history of CBD stone and pancreatitis with an ERCP 1 to 2 years ago with Dr. Miller.    CT chest/abdomen/pelvis ordered and patient found to have new distal CBD stone.  Patient significantly improved from arrival but still with right upper quadrant tenderness to palpation and leukocytosis.  Concern for possible choledocholithiasis for cholangitis.  GI consult placed and patient admitted to medicine.

## 2024-05-29 NOTE — ED PROVIDER NOTE - CARE PLAN
Principal Discharge DX:	Sepsis   1 Principal Discharge DX:	Sepsis  Secondary Diagnosis:	Acute cholangitis

## 2024-05-29 NOTE — ED PROVIDER NOTE - OBJECTIVE STATEMENT
ERIK BLACKWOOD is a 74yo Female with PMH DM, HTN, HLD, Pancreatitis who presents c/o nausea and vomiting. Pt states she started feeling unwell this afternoon. Describes several episodes of vomiting. PT denies any symptoms of cp/sob, fevers, chills, urinary symptoms, weakness, confusion. PT w/ IDDM and states she has been complaint w/ medications.

## 2024-05-29 NOTE — ED PROVIDER NOTE - CLINICAL SUMMARY MEDICAL DECISION MAKING FREE TEXT BOX
ASSESSMENT:   ERIK BLACKWOOD is a 74yo F who presented with abdominal pain, n/v x 1 days. Febrile tachy and hypoxic on arrival. PT ill appearing on physical w/ left lung ronchi, emesis on and urine on clothes. Abdomen ttp.     PLAN: Control symptoms. Treat for sepsis w/ abx, fluids. Cxr, ua and labs to evaluate for source. vbg, acetone to screen for dka. Reassess. 73-year-old female presenting with fever, nausea, NBNB emesis and weakness x 1 day.  Patient slightly confused on arrival.  Meets sepsis criteria.  Broad-spectrum antibiotics and sepsis workup initiated. patient initially hypoxic on room air but that improved as mental status improved after antibiotics IV fluids and antipyretics.  When  arrived gave additional story including history of CBD stone and pancreatitis with an ERCP 1 to 2 years ago with Dr. Miller.    CT chest/abdomen/pelvis ordered and patient found to have new distal CBD stone.  Patient significantly improved from arrival but still with right upper quadrant tenderness to palpation and leukocytosis.  Concern for possible choledocholithiasis for cholangitis.  GI consult placed and patient admitted to medicine.

## 2024-05-29 NOTE — ED PROVIDER NOTE - NSICDXPASTMEDICALHX_GEN_ALL_CORE_FT
Abnormal Lactate
PAST MEDICAL HISTORY:  Diabetes     Endogenous hyperlipemia     Herniated lumbar intervertebral disc     HTN (hypertension)     Pancreatitis

## 2024-05-29 NOTE — ED ADULT TRIAGE NOTE - CHIEF COMPLAINT QUOTE
BIBEMS with c/o N/V and weakness x 1 day. vomiting in triage. denies c/o pain. O2 sat 88% RA, placed on 2 L NC.

## 2024-05-29 NOTE — ED PROVIDER NOTE - NS ED ROS FT
Review of Systems  SKIN: warm, dry w/ no rash or bleeding  RESPIRATORY: no cough or SOB  CARDIAC: no chest pain & no palpitations  GI: +ABODMINAL PAIN +NAUSEA +VOMITING  MUSCULOSKELETAL:  no joint pain, swelling or redness  NEURO: Alert, oriented x3. No weakness, numbness.

## 2024-05-29 NOTE — ED ADULT NURSE NOTE - OBJECTIVE STATEMENT
patient presents to the ER for nausea, vomiting, sob and weakness, patient with spo2 88% upon arrival and patient placed on 2l/min via nc. patient denies chest pain, fever, cough or diarrhea.

## 2024-05-29 NOTE — ED PROVIDER NOTE - PHYSICAL EXAMINATION
Gen: Ill appearing.   Head: NC/AT  Neck: trachea midline  Resp: +hypoxia, +ronchi left lung  Abd: soft, nd, +Generalized ttp  Ext: no deformities  Neuro:  A&O appears non focal  Skin:  Warm and dry as visualized  Psych:  Normal affect and mood

## 2024-05-30 DIAGNOSIS — A41.9 SEPSIS, UNSPECIFIED ORGANISM: ICD-10-CM

## 2024-05-30 LAB
ALBUMIN SERPL ELPH-MCNC: 4.1 G/DL — SIGNIFICANT CHANGE UP (ref 3.3–5.2)
ALP SERPL-CCNC: 266 U/L — HIGH (ref 40–120)
ALT FLD-CCNC: 546 U/L — HIGH
ANION GAP SERPL CALC-SCNC: 15 MMOL/L — SIGNIFICANT CHANGE UP (ref 5–17)
AST SERPL-CCNC: 852 U/L — HIGH
BILIRUB SERPL-MCNC: 2 MG/DL — SIGNIFICANT CHANGE UP (ref 0.4–2)
BUN SERPL-MCNC: 26.8 MG/DL — HIGH (ref 8–20)
CALCIUM SERPL-MCNC: 9.9 MG/DL — SIGNIFICANT CHANGE UP (ref 8.4–10.5)
CHLORIDE SERPL-SCNC: 96 MMOL/L — SIGNIFICANT CHANGE UP (ref 96–108)
CO2 SERPL-SCNC: 28 MMOL/L — SIGNIFICANT CHANGE UP (ref 22–29)
CREAT SERPL-MCNC: 1.13 MG/DL — SIGNIFICANT CHANGE UP (ref 0.5–1.3)
EGFR: 51 ML/MIN/1.73M2 — LOW
FLUAV AG NPH QL: SIGNIFICANT CHANGE UP
FLUBV AG NPH QL: SIGNIFICANT CHANGE UP
GLUCOSE BLDC GLUCOMTR-MCNC: 186 MG/DL — HIGH (ref 70–99)
GLUCOSE BLDC GLUCOMTR-MCNC: 200 MG/DL — HIGH (ref 70–99)
GLUCOSE BLDC GLUCOMTR-MCNC: 247 MG/DL — HIGH (ref 70–99)
GLUCOSE BLDC GLUCOMTR-MCNC: 277 MG/DL — HIGH (ref 70–99)
GLUCOSE SERPL-MCNC: 279 MG/DL — HIGH (ref 70–99)
POTASSIUM SERPL-MCNC: 3.5 MMOL/L — SIGNIFICANT CHANGE UP (ref 3.5–5.3)
POTASSIUM SERPL-SCNC: 3.5 MMOL/L — SIGNIFICANT CHANGE UP (ref 3.5–5.3)
PROT SERPL-MCNC: 7.3 G/DL — SIGNIFICANT CHANGE UP (ref 6.6–8.7)
RSV RNA NPH QL NAA+NON-PROBE: SIGNIFICANT CHANGE UP
SARS-COV-2 RNA SPEC QL NAA+PROBE: SIGNIFICANT CHANGE UP
SODIUM SERPL-SCNC: 139 MMOL/L — SIGNIFICANT CHANGE UP (ref 135–145)

## 2024-05-30 PROCEDURE — 99223 1ST HOSP IP/OBS HIGH 75: CPT

## 2024-05-30 PROCEDURE — 71260 CT THORAX DX C+: CPT | Mod: 26,MC

## 2024-05-30 PROCEDURE — 99223 1ST HOSP IP/OBS HIGH 75: CPT | Mod: FS

## 2024-05-30 PROCEDURE — 74177 CT ABD & PELVIS W/CONTRAST: CPT | Mod: 26,MC

## 2024-05-30 RX ORDER — ACETAMINOPHEN 500 MG
650 TABLET ORAL EVERY 6 HOURS
Refills: 0 | Status: DISCONTINUED | OUTPATIENT
Start: 2024-05-30 | End: 2024-06-03

## 2024-05-30 RX ORDER — SODIUM CHLORIDE 9 MG/ML
1000 INJECTION, SOLUTION INTRAVENOUS
Refills: 0 | Status: DISCONTINUED | OUTPATIENT
Start: 2024-05-30 | End: 2024-05-30

## 2024-05-30 RX ORDER — OXYCODONE HYDROCHLORIDE 5 MG/1
5 TABLET ORAL
Refills: 0 | Status: DISCONTINUED | OUTPATIENT
Start: 2024-05-30 | End: 2024-06-03

## 2024-05-30 RX ORDER — METOPROLOL TARTRATE 50 MG
50 TABLET ORAL DAILY
Refills: 0 | Status: DISCONTINUED | OUTPATIENT
Start: 2024-05-30 | End: 2024-06-03

## 2024-05-30 RX ORDER — ONDANSETRON 8 MG/1
4 TABLET, FILM COATED ORAL EVERY 8 HOURS
Refills: 0 | Status: DISCONTINUED | OUTPATIENT
Start: 2024-05-30 | End: 2024-06-03

## 2024-05-30 RX ORDER — DEXTROSE 50 % IN WATER 50 %
12.5 SYRINGE (ML) INTRAVENOUS ONCE
Refills: 0 | Status: DISCONTINUED | OUTPATIENT
Start: 2024-05-30 | End: 2024-06-03

## 2024-05-30 RX ORDER — SACCHAROMYCES BOULARDII 250 MG
250 POWDER IN PACKET (EA) ORAL
Refills: 0 | Status: DISCONTINUED | OUTPATIENT
Start: 2024-05-30 | End: 2024-06-03

## 2024-05-30 RX ORDER — DEXTROSE 50 % IN WATER 50 %
25 SYRINGE (ML) INTRAVENOUS ONCE
Refills: 0 | Status: DISCONTINUED | OUTPATIENT
Start: 2024-05-30 | End: 2024-06-03

## 2024-05-30 RX ORDER — INSULIN LISPRO 100/ML
VIAL (ML) SUBCUTANEOUS
Refills: 0 | Status: DISCONTINUED | OUTPATIENT
Start: 2024-05-30 | End: 2024-05-31

## 2024-05-30 RX ORDER — PIPERACILLIN AND TAZOBACTAM 4; .5 G/20ML; G/20ML
3.38 INJECTION, POWDER, LYOPHILIZED, FOR SOLUTION INTRAVENOUS EVERY 8 HOURS
Refills: 0 | Status: DISCONTINUED | OUTPATIENT
Start: 2024-05-30 | End: 2024-06-01

## 2024-05-30 RX ORDER — SODIUM CHLORIDE 9 MG/ML
1000 INJECTION, SOLUTION INTRAVENOUS
Refills: 0 | Status: DISCONTINUED | OUTPATIENT
Start: 2024-05-30 | End: 2024-06-03

## 2024-05-30 RX ORDER — ASPIRIN/CALCIUM CARB/MAGNESIUM 324 MG
1 TABLET ORAL
Qty: 0 | Refills: 0 | DISCHARGE

## 2024-05-30 RX ORDER — ATORVASTATIN CALCIUM 80 MG/1
10 TABLET, FILM COATED ORAL AT BEDTIME
Refills: 0 | Status: DISCONTINUED | OUTPATIENT
Start: 2024-05-30 | End: 2024-06-03

## 2024-05-30 RX ORDER — INSULIN GLARGINE 100 [IU]/ML
3 INJECTION, SOLUTION SUBCUTANEOUS EVERY MORNING
Refills: 0 | Status: DISCONTINUED | OUTPATIENT
Start: 2024-05-31 | End: 2024-06-02

## 2024-05-30 RX ORDER — LANOLIN ALCOHOL/MO/W.PET/CERES
3 CREAM (GRAM) TOPICAL AT BEDTIME
Refills: 0 | Status: DISCONTINUED | OUTPATIENT
Start: 2024-05-30 | End: 2024-06-03

## 2024-05-30 RX ORDER — INSULIN GLARGINE 100 [IU]/ML
3 INJECTION, SOLUTION SUBCUTANEOUS AT BEDTIME
Refills: 0 | Status: DISCONTINUED | OUTPATIENT
Start: 2024-05-30 | End: 2024-06-03

## 2024-05-30 RX ORDER — DEXTROSE 50 % IN WATER 50 %
15 SYRINGE (ML) INTRAVENOUS ONCE
Refills: 0 | Status: DISCONTINUED | OUTPATIENT
Start: 2024-05-30 | End: 2024-06-03

## 2024-05-30 RX ORDER — LIPASE/PROTEASE/AMYLASE 16-48-48K
1 CAPSULE,DELAYED RELEASE (ENTERIC COATED) ORAL
Refills: 0 | Status: DISCONTINUED | OUTPATIENT
Start: 2024-05-30 | End: 2024-06-03

## 2024-05-30 RX ORDER — INSULIN GLARGINE 100 [IU]/ML
3 INJECTION, SOLUTION SUBCUTANEOUS AT BEDTIME
Refills: 0 | Status: DISCONTINUED | OUTPATIENT
Start: 2024-05-30 | End: 2024-05-30

## 2024-05-30 RX ORDER — SODIUM CHLORIDE 9 MG/ML
1000 INJECTION, SOLUTION INTRAVENOUS
Refills: 0 | Status: COMPLETED | OUTPATIENT
Start: 2024-05-30 | End: 2024-05-30

## 2024-05-30 RX ORDER — INDOMETHACIN 50 MG
100 CAPSULE ORAL ONCE
Refills: 0 | Status: COMPLETED | OUTPATIENT
Start: 2024-05-31 | End: 2024-05-31

## 2024-05-30 RX ORDER — ALPRAZOLAM 0.25 MG
0.25 TABLET ORAL AT BEDTIME
Refills: 0 | Status: DISCONTINUED | OUTPATIENT
Start: 2024-05-30 | End: 2024-06-03

## 2024-05-30 RX ORDER — GLUCAGON INJECTION, SOLUTION 0.5 MG/.1ML
1 INJECTION, SOLUTION SUBCUTANEOUS ONCE
Refills: 0 | Status: DISCONTINUED | OUTPATIENT
Start: 2024-05-30 | End: 2024-06-03

## 2024-05-30 RX ORDER — LIPASE/PROTEASE/AMYLASE 16-48-48K
1 CAPSULE,DELAYED RELEASE (ENTERIC COATED) ORAL
Qty: 0 | Refills: 0 | DISCHARGE

## 2024-05-30 RX ORDER — DEXTROSE 10 % IN WATER 10 %
125 INTRAVENOUS SOLUTION INTRAVENOUS ONCE
Refills: 0 | Status: DISCONTINUED | OUTPATIENT
Start: 2024-05-30 | End: 2024-06-03

## 2024-05-30 RX ORDER — PANTOPRAZOLE SODIUM 20 MG/1
40 TABLET, DELAYED RELEASE ORAL
Refills: 0 | Status: DISCONTINUED | OUTPATIENT
Start: 2024-05-30 | End: 2024-06-03

## 2024-05-30 RX ADMIN — ONDANSETRON 4 MILLIGRAM(S): 8 TABLET, FILM COATED ORAL at 17:40

## 2024-05-30 RX ADMIN — Medication 975 MILLIGRAM(S): at 02:51

## 2024-05-30 RX ADMIN — SODIUM CHLORIDE 100 MILLILITER(S): 9 INJECTION, SOLUTION INTRAVENOUS at 12:30

## 2024-05-30 RX ADMIN — Medication 1 CAPSULE(S): at 12:32

## 2024-05-30 RX ADMIN — Medication 975 MILLIGRAM(S): at 00:15

## 2024-05-30 RX ADMIN — OXYCODONE HYDROCHLORIDE 5 MILLIGRAM(S): 5 TABLET ORAL at 12:31

## 2024-05-30 RX ADMIN — Medication 2: at 18:10

## 2024-05-30 RX ADMIN — Medication 250 MILLIGRAM(S): at 17:37

## 2024-05-30 RX ADMIN — Medication 1: at 12:33

## 2024-05-30 RX ADMIN — OXYCODONE HYDROCHLORIDE 5 MILLIGRAM(S): 5 TABLET ORAL at 17:37

## 2024-05-30 RX ADMIN — Medication 1 CAPSULE(S): at 17:37

## 2024-05-30 RX ADMIN — INSULIN GLARGINE 3 UNIT(S): 100 INJECTION, SOLUTION SUBCUTANEOUS at 22:43

## 2024-05-30 RX ADMIN — PIPERACILLIN AND TAZOBACTAM 25 GRAM(S): 4; .5 INJECTION, POWDER, LYOPHILIZED, FOR SOLUTION INTRAVENOUS at 12:31

## 2024-05-30 RX ADMIN — PIPERACILLIN AND TAZOBACTAM 25 GRAM(S): 4; .5 INJECTION, POWDER, LYOPHILIZED, FOR SOLUTION INTRAVENOUS at 21:56

## 2024-05-30 RX ADMIN — SODIUM CHLORIDE 1950 MILLILITER(S): 9 INJECTION, SOLUTION INTRAVENOUS at 00:18

## 2024-05-30 RX ADMIN — ATORVASTATIN CALCIUM 10 MILLIGRAM(S): 80 TABLET, FILM COATED ORAL at 21:57

## 2024-05-30 RX ADMIN — OXYCODONE HYDROCHLORIDE 5 MILLIGRAM(S): 5 TABLET ORAL at 13:00

## 2024-05-30 RX ADMIN — PIPERACILLIN AND TAZOBACTAM 200 GRAM(S): 4; .5 INJECTION, POWDER, LYOPHILIZED, FOR SOLUTION INTRAVENOUS at 00:14

## 2024-05-30 RX ADMIN — Medication 250 MILLIGRAM(S): at 00:51

## 2024-05-30 NOTE — H&P ADULT - NSHPLABSRESULTS_GEN_ALL_CORE
11.4   14.82 )-----------( 232      ( 29 May 2024 23:10 )             33.3   05-29    139  |  96  |  26.8<H>  ----------------------------<  279<H>  3.5   |  28.0  |  1.13    Ca    9.9      29 May 2024 23:10    TPro  7.3  /  Alb  4.1  /  TBili  2.0  /  DBili  x   /  AST  852<H>  /  ALT  546<H>  /  AlkPhos  266<H>  05-29  _____________________________________________________________  IMAGING:    CT Abdomen and Pelvis w/ IV Cont (05.30.24 @ 03:35) >    FINDINGS:  CHEST:  LUNGS AND LARGE AIRWAYS: Patent central airways. No pulmonary   consolidation. Stable appearance of left lower lobe clustered punctate   nodularity with multiple small calcifications likely reflecting chronic   small airways disease (3, 73).  PLEURA: No pleural effusion.  VESSELS: Atherosclerotic changes of the aorta and coronary arteries.  HEART: Heart size is normal. No pericardial effusion. Mitral valve   annular calcification.  MEDIASTINUM AND SIOMARA: No lymphadenopathy.  CHEST WALL AND LOWER NECK:  Small hypodense thyroid nodules.    ABDOMEN AND PELVIS:  LIVER: Within normal limits.  BILE DUCTS: Redemonstrated intrahepatic and CBD dilatation, with CBD   measuring up to 1.3 cm. New 9 mm focal rounded density within the distal   CBD (3, 120 and 5, 45), most compatible with stone.  GALLBLADDER: Cholecystectomy.  SPLEEN: Within normal limits.  PANCREAS: Pancreatic body calcification again noted and likely related to   sequela of chronic pancreatitis. Focus of air within the pancreatic duct   (2, 112), presumably related to prior instrumentation.  ADRENALS: Within normal limits.  KIDNEYS/URETERS: No hydronephrosis or obstructing stone. Symmetric renal   enhancement. Nonspecific mild bilateral perinephric stranding.    BLADDER: Diffuse bladder wall thickening though under distended.  REPRODUCTIVE ORGANS: Uterus and right adnexa unremarkable. 1.4 segment   left ovarian cyst.    BOWEL: No bowel obstruction. Appendix is not visualized. No evidence of   inflammation in the pericecal region. Prominent rectal stool burden. Mild   wall thickening of the transverse, descending and sigmoid colon. Mild   diffuse gastric wall thickening. Duodenal diverticulum. Mild colonic   diverticulosis.  PERITONEUM: No ascites.  VESSELS: Atherosclerotic changes.  RETROPERITONEUM/LYMPH NODES: No lymphadenopathy.  ABDOMINAL WALL: Mild nonspecific subcutaneous infiltration in the left   ventral abdominal wall.  BONES: Degenerative changes.    IMPRESSION:    1. New stone within the distal CBD. Intrahepatic and CBD dilatation   similar to prior exam.    2. Diffuse bladder wall thickening though under distended. Nonspecific   mild bilateral perinephric stranding. Recommend correlation with   urinalysis.    3. Mild wall thickening of the transverse through sigmoid colon may   reflect underdistention or colitis. Clinical correlation recommended.    4. Mild diffuse gastric wall thickening could reflect underdistention or   gastritis. Recommend clinical correlation and follow-up endoscopy as   warranted.

## 2024-05-30 NOTE — ED ADULT NURSE REASSESSMENT NOTE - NS ED NURSE REASSESS COMMENT FT1
Report received from off going RN, care of pt assumed at 0730. pt received resting on stretcher,  resp even and unlabored, speaking full sentences. sugey.[email or web address]. pt updated opn plan of care, questions asked and answered.

## 2024-05-30 NOTE — CONSULT NOTE ADULT - NS ATTEND AMEND GEN_ALL_CORE FT
Patient seen and examined at bedside. She meets clinical criteria for cholangitis. At the time of visit, she is well appearing and stable. I offered her an ERCP today to remove the stone and place a stent. She states that since she is feeling well she would rather wait for Dr. Miller. Continue antibiotics. NPO after midnight. Clear liquid diet.

## 2024-05-30 NOTE — H&P ADULT - NSHPPHYSICALEXAM_GEN_ALL_CORE
T(C): 36.6 (05-30-24 @ 03:38), Max: 38.6 (05-29-24 @ 20:56)  HR: 84 (05-30-24 @ 03:38) (84 - 102)  BP: 131/56 (05-30-24 @ 03:38) (131/56 - 157/81)  RR: 18 (05-30-24 @ 03:38) (18 - 20)  SpO2: 97% (05-30-24 @ 03:38) (88% - 98%)    CONSTITUTIONAL: no apparent distress  EYES: PERRLA, non-icteric  ENMT: Oral mucosa with dry membranes  RESP: No respiratory distress, clear to auscultation bilaterally, no wheezes or rales  CV: RRR, +S1S2, no MRG; no JVD; no peripheral edema  GI: Soft, non distended, mild RUQ pain on deep palpation  MSK: Normal ROM without pain, normal muscle strength/tone  PSYCH: A+O x 3, mood and affect appropriate  NEURO: Cooperative, upper and lower motor function grossly intact bilaterally, sensation grossly intact throughout

## 2024-05-30 NOTE — H&P ADULT - ASSESSMENT
72yo female with hx of HTN, HLD, T2DM, chronic lower back pain, multiple episodes of pancreatitis, GERD who presents to the ED with abdominal pain associated with nausea and NBNB emesis for last 2 days. She states pain improved since coming to the ED but has RUQ pain on deep palpation. Temp 101.4 on arrival. WBC 14.8. TIMOTHY, ALT//852, bili 2.0. CT with intrahepatic and CBD dilation. GI consulted and planning for ERCP, Pt admitted to medicine for further management.     Sepsis 2/2 choledocholithiasis +/- cystitis/pyelonephritis  - admit to telemetry  - CT showed bladder wall thickening and mild bilateral perinephric stranding but UA without bacteria, WBC, nitrites or LE   - given vanc and zosyn in the ED, c/w zosyn for now which covers all above infections  - f/u blood and urine clx   - start IV fluids while NPO  - discussed with GI, can advance diet as tolerated and NPO after MN for ERCP tomorrow  - trend CBC and LFTs  - resume creon and florastor    TIMOTHY   - likely 2/2 dehydration +/- ATN  - start IV fluids x 12hr  - advance diet as tolerated and encouraged PO fluids  - trend BMP     HTN/HLD  - hold ramipril for timothy  - resume toprol 50mg qd and lipitor 10mg     T2DM  - hold metformin while admitted and with timothy  - takes levemir 7U at 9AM and 5U at 9PM, but will hold while NPO and resume when able  - FS q6hr while NPO and lispro sliding scale as needed    GERD/gastritis  - protonix    DVT ppx: ambulation as tolerated, SCDs while in bed     72yo female with hx of HTN, HLD, T2DM, chronic lower back pain, multiple episodes of pancreatitis, GERD who presents to the ED with abdominal pain associated with nausea and NBNB emesis for last 2 days. She states pain improved since coming to the ED but has RUQ pain on deep palpation. Temp 101.4 on arrival. WBC 14.8. TIMOTHY, ALT//852, bili 2.0. CT with intrahepatic and CBD dilation. GI consulted and planning for ERCP, Pt admitted to medicine for further management.     Sepsis 2/2 choledocholithiasis and cholangitis +/- cystitis/pyelonephritis  - admit to telemetry  - CT a/p with new stone in CBD with CBD and intrahepatic duct dilations  - also showed bladder wall thickening and mild bilateral perinephric stranding   - UA without bacteria, WBC, nitrites or LE   - given vanc and zosyn in the ED, c/w zosyn for now which covers both infections  - f/u blood and urine clx   - start IV fluids while NPO  - discussed with GI, can advance diet as tolerated and NPO after MN for ERCP tomorrow  - trend CBC and LFTs  - resume creon and florastor    TIMOTHY   - likely 2/2 dehydration and ATN  - start IV fluids x 12hr  - advance diet as tolerated and encouraged PO fluids  - trend BMP     HTN/HLD  - hold ramipril for timothy  - resume toprol 50mg qd and lipitor 10mg     T2DM  - hold metformin while admitted and with timothy  - takes levemir 7U at 9AM and 5U at 9PM, but will hold while NPO and resume when able  - FS q6hr while NPO and lispro sliding scale as needed    GERD/gastritis  - protonix    DVT ppx: ambulation as tolerated, SCDs while in bed     74yo female with hx of HTN, HLD, T2DM, chronic lower back pain, multiple episodes of pancreatitis, GERD who presents to the ED with abdominal pain associated with nausea and NBNB emesis for last 2 days. She states pain improved since coming to the ED but has RUQ pain on deep palpation. Temp 101.4 on arrival. WBC 14.8. TIMOTHY, ALT//852, bili 2.0. CT with intrahepatic and CBD dilation. GI consulted and planning for ERCP, Pt admitted to medicine for further management.     Sepsis 2/2 choledocholithiasis and cholangitis +/- cystitis/pyelonephritis  - admit to telemetry  - CT a/p with new stone in CBD with CBD and intrahepatic duct dilations  - also showed bladder wall thickening and mild bilateral perinephric stranding   - UA without bacteria, WBC, nitrites or LE   - given vanc and zosyn in the ED, c/w zosyn for now which covers both infections  - f/u blood and urine clx   - start IV fluids while NPO  - discussed with GI, can advance diet as tolerated and NPO after MN for ERCP tomorrow  - trend CBC and LFTs  - resume creon and florastor    TIMOTHY   - likely 2/2 dehydration and ATN  - start IV fluids x 12hr  - advance diet as tolerated and encouraged PO fluids  - trend BMP     HTN/HLD  - hold ramipril for timothy  - resume toprol 50mg qd and lipitor 10mg     T2DM  - hold metformin while admitted and with timothy  - takes levemir 7U at 9AM and 5U at 9PM  - reduce to 3U BID while NPO  - FS q6hr while NPO and lispro sliding scale as needed    GERD/gastritis  - protonix    DVT ppx: ambulation as tolerated, SCDs while in bed     72yo female with hx of HTN, HLD, T2DM, chronic lower back pain, multiple episodes of pancreatitis, GERD who presents to the ED with abdominal pain associated with nausea and NBNB emesis for last 2 days. She states pain improved since coming to the ED but has RUQ pain on deep palpation. Temp 101.4 on arrival. WBC 14.8. TIMOTHY, ALT//852, bili 2.0. CT with intrahepatic and CBD dilation. GI consulted and planning for ERCP, Pt admitted to medicine for further management.     Sepsis 2/2 choledocholithiasis and cholangitis +/- cystitis/pyelonephritis  - admit to telemetry  - CT a/p with new stone in CBD with CBD and intrahepatic duct dilations  - also showed bladder wall thickening and mild bilateral perinephric stranding   - UA without bacteria, WBC, nitrites or LE   - given vanc and zosyn in the ED, c/w zosyn for now which covers both infections  - f/u blood and urine clx   - start IV fluids while NPO  - discussed with GI, can advance diet as tolerated and NPO after MN for ERCP tomorrow  - trend CBC and LFTs  - resume creon and florastor    TIMOTHY   - likely 2/2 dehydration and ATN  - start IV fluids x 12hr  - advance diet as tolerated and encouraged PO fluids  - trend BMP     HTN/HLD  - hold ramipril for timothy  - resume toprol 50mg qd and lipitor 10mg     T2DM  - hold metformin while admitted and with timothy  - takes levemir 7U at 9AM and 5U at 9PM  - reduce to 3U BID  - FS q6hr while NPO and lispro sliding scale as needed    GERD/gastritis  - protonix    DVT ppx: ambulation as tolerated, SCDs while in bed

## 2024-05-30 NOTE — ED ADULT NURSE REASSESSMENT NOTE - NS ED NURSE REASSESS COMMENT FT1
patient informed plan of care and states understanding, family at bedside and call bell at reach. patient ambulating to the bathroom with no incidence/ difficulty.

## 2024-05-30 NOTE — H&P ADULT - NSHPPOAPULMEMBOLUS_GEN_A_CORE
no independent Cyclophosphamide Pregnancy And Lactation Text: This medication is Pregnancy Category D and it isn't considered safe during pregnancy. This medication is excreted in breast milk.

## 2024-05-30 NOTE — PATIENT PROFILE ADULT - FALL HARM RISK - UNIVERSAL INTERVENTIONS
Bed in lowest position, wheels locked, appropriate side rails in place/Call bell, personal items and telephone in reach/Instruct patient to call for assistance before getting out of bed or chair/Non-slip footwear when patient is out of bed/Round[employer] to call system/Physically safe environment - no spills, clutter or unnecessary equipment/Purposeful Proactive Rounding/Room/bathroom lighting operational, light cord in reach

## 2024-05-30 NOTE — CONSULT NOTE ADULT - ASSESSMENT
74 y/o F with PMHX DM, HTN, HLD, pancreatitis, cholangitis presents to ED c/o weakness, abdominal pain and nausea/vomiting.     #Choledocholithiasis   #Cholangitis  CT A/P w/ IV Cont (05.30.24)- new stone within distal CBD, intrahepatic and CBD dilatation, mild wall thickening of transverse colon through sigmoid colon (underdistention vs. colitis), mild diffuse gastric wall thickening (underdistention vs. gastritis).   1/2022 ERCP- cholangitis, biliary sphincterotomy and stent placement performed, PD stent placed; these were subsequently removed in 3/2022    - Plan for EGD/ERCP tomorrow 5/31/24. Patient declined procedure today wants Dr. Miller to do it tomorrow   - Diet as tolerated today, NPO after midnight  - Maintain current T&S, INR <1.5, Hgb >7.0, Plt >50 prior to procedure  - Hold AC for procedure  - Trend CBC, CMP, coags  - Monitor for fever, abdominal pain   - Continue IV antibiotics   - Eventual outpatient screening colonoscopy as patient has never had   _________________________________________________________________  Assessment and recommendations are final when note is signed by the attending physician.

## 2024-05-30 NOTE — H&P ADULT - TIME BILLING
chart review, patient evaluation, medication reconciliation, independent hx taking, documentation, coordination of care and discussion with nurses and consultants.

## 2024-05-30 NOTE — H&P ADULT - HISTORY OF PRESENT ILLNESS
72yo female with hx of HTN, HLD, T2DM, chronic lower back pain, multiple episodes of pancreatitis, GERD who presents to the ED with abdominal pain associated with nausea and NBNB emesis for last 2 days. States on Tuesday evening ate dinner and about 2 hours later was having "gas pain" in the epigastric area. She put on a heating pad with gradual resolution of her pain. She went to bed and went to work next day without recurrence of pain. Ate lunch in the afternoon and again had gas pain 2 hr after eating however pain never improved. She placed a heating pad on when she got home but still without relief. 30 minutes later had nausea and vomiting thus decided to call an ambulance and come to the hospital. She denies fever, cough, sore throat, chest pain, SOB, palpitations, diarrhea, constipation, dysuria, LE swelling or pain.

## 2024-05-30 NOTE — PATIENT PROFILE ADULT - HAVE YOU BEEN EATING POORLY BECAUSE OF A DECREASED APPETITE?
No (0)

## 2024-05-30 NOTE — CONSULT NOTE ADULT - SUBJECTIVE AND OBJECTIVE BOX
Chief Complaint:  Patient is a 73y old  Female who presents with a chief complaint of weakness       Patient is a 73y old  Female who presents with a chief complaint of weakness     HPI: 72 y/o F with PMHX DM, HTN, HLD, pancreatitis, cholangitis presents to ED c/o weakness, epigastric abdominal pain and nausea/vomiting. Patient notes the symptoms started in the afternoon yesterday. She was febrile to 101.4F upon arrival to the ED. Denies hematemesis, hematochezia, melena, diarrhea. She has a history of cholangitis. In January 2022 she had an ERCP that showed cholangitis, biliary sphincterotomy and stent placement performed, PD stent placed; these were subsequently removed in March 2022. CT A/P showed new stone within distal CBD, intrahepatic and CBD dilatation, mild wall thickening of transverse colon through sigmoid colon (underdistention vs. colitis), mild diffuse gastric wall thickening (underdistention vs. gastritis). No prior colonoscopy.       PAST MEDICAL & SURGICAL HISTORY:  Diabetes      HTN (hypertension)      Endogenous hyperlipemia      Herniated lumbar intervertebral disc      Pancreatitis      S/P appendectomy      S/P cholecystectomy      S/P tonsillectomy      Status post cataract extraction  left eye 8/3/2015          REVIEW OF SYSTEMS:   General: Negative  HEENT: Negative  CV: Negative  Respiratory: Negative  GI: See HPI  : Negative  MSK: Negative  Hematologic: Negative  Skin: Negative    MEDICATIONS:   MEDICATIONS  (STANDING):  atorvastatin 10 milliGRAM(s) Oral at bedtime  dextrose 10% Bolus 125 milliLiter(s) IV Bolus once  dextrose 5% + lactated ringers. 1000 milliLiter(s) (100 mL/Hr) IV Continuous <Continuous>  dextrose 5%. 1000 milliLiter(s) (50 mL/Hr) IV Continuous <Continuous>  dextrose 5%. 1000 milliLiter(s) (100 mL/Hr) IV Continuous <Continuous>  dextrose 50% Injectable 25 Gram(s) IV Push once  dextrose 50% Injectable 12.5 Gram(s) IV Push once  glucagon  Injectable 1 milliGRAM(s) IntraMuscular once  insulin lispro (ADMELOG) corrective regimen sliding scale   SubCutaneous three times a day before meals  metoprolol succinate ER 50 milliGRAM(s) Oral daily  pantoprazole    Tablet 40 milliGRAM(s) Oral before breakfast  piperacillin/tazobactam IVPB.. 3.375 Gram(s) IV Intermittent every 8 hours  saccharomyces boulardii 250 milliGRAM(s) Oral two times a day    MEDICATIONS  (PRN):  acetaminophen     Tablet .. 650 milliGRAM(s) Oral every 6 hours PRN Temp greater or equal to 38C (100.4F), Mild Pain (1 - 3)  ALPRAZolam 0.25 milliGRAM(s) Oral at bedtime PRN anxiety/insomnia  aluminum hydroxide/magnesium hydroxide/simethicone Suspension 30 milliLiter(s) Oral every 4 hours PRN Dyspepsia  dextrose Oral Gel 15 Gram(s) Oral once PRN Blood Glucose LESS THAN 70 milliGRAM(s)/deciliter  melatonin 3 milliGRAM(s) Oral at bedtime PRN Insomnia  ondansetron Injectable 4 milliGRAM(s) IV Push every 8 hours PRN Nausea and/or Vomiting  oxyCODONE    IR 5 milliGRAM(s) Oral four times a day PRN Moderate Pain (4 - 6)          DIET:  Diet, NPO after Midnight:      NPO Start Date: 30-May-2024,   NPO Start Time: 23:59  Except Medications (05-30-24 @ 09:53) [Active]  Diet, Consistent Carbohydrate Clear Liquid (05-30-24 @ 08:09) [Active]  Diet, Consistent Carbohydrate/No Snacks (05-30-24 @ 08:04) [Available for Activation]  Diet, Consistent Carbohydrate Full Liquid (05-30-24 @ 08:04) [Available for Activation]          ALLERGIES:   Allergies    penicillin (Anaphylaxis)  Keflex (Anaphylaxis)    Intolerances        Substance Use:   (  ) never used  (  ) other:  Tobacco Usage:  (   ) never smoked   (   ) former smoker   (   ) current smoker  (     ) pack year  (        ) last cigarette date  Alcohol Usage:    Family History   IBD (  ) Yes   (  ) No  GI Malignancy (  )  Yes    (  ) No    Health Management  Last Colonoscopy:  Last Endoscopy:     VITAL SIGNS:   Vital Signs Last 24 Hrs  T(C): 36.6 (30 May 2024 03:38), Max: 38.6 (29 May 2024 20:56)  T(F): 97.9 (30 May 2024 03:38), Max: 101.4 (29 May 2024 20:56)  HR: 84 (30 May 2024 03:38) (84 - 102)  BP: 131/56 (30 May 2024 03:38) (131/56 - 157/81)  BP(mean): --  RR: 18 (30 May 2024 03:38) (18 - 20)  SpO2: 97% (30 May 2024 03:38) (88% - 98%)    Parameters below as of 30 May 2024 03:38  Patient On (Oxygen Delivery Method): room air      I&O's Summary      PHYSICAL EXAM:   GENERAL:  No acute distress  HEENT:  NC/AT, conjunctiva clear, sclera anicteric  CHEST:  No increased effort  HEART:  Regular rate  ABDOMEN:  Soft, non-tender, non-distended, no rebound or guarding  EXTREMITIES: No edema  SKIN:  Warm, dry  NEURO:  Calm, cooperative    LABS:                        11.4   14.82 )-----------( 232      ( 29 May 2024 23:10 )             33.3     Hemoglobin: 11.4 g/dL (05-29-24 @ 23:10)    05-29    139  |  96  |  26.8<H>  ----------------------------<  279<H>  3.5   |  28.0  |  1.13    Ca    9.9      29 May 2024 23:10    TPro  7.3  /  Alb  4.1  /  TBili  2.0  /  DBili  x   /  AST  852<H>  /  ALT  546<H>  /  AlkPhos  266<H>  05-29    LIVER FUNCTIONS - ( 29 May 2024 23:10 )  Alb: 4.1 g/dL / Pro: 7.3 g/dL / ALK PHOS: 266 U/L / ALT: 546 U/L / AST: 852 U/L / GGT: x             PT/INR - ( 29 May 2024 23:10 )   PT: 11.0 sec;   INR: 0.99 ratio         PTT - ( 29 May 2024 23:10 )  PTT:25.3 sec    Lipase: 15 U/L (05-29-24 @ 23:10)                                    RADIOLOGY & ADDITIONAL STUDIES:      ACC: [id number] EXAM:  CT ABDOMEN AND PELVIS IC   ORDERED BY: ADAIR GRULLON     ACC: [id number] EXAM:  CT CHEST IC   ORDERED BY: ADAIR GRULLON     PROCEDURE DATE:  05/30/2024          INTERPRETATION:  CLINICAL INFORMATION: Sepsis.    COMPARISON: 1/19/2022 abdominal MRI, 1/16/2022 abdominal CT    CONTRAST/COMPLICATIONS:  IV Contrast: Omnipaque 350  90 cc administered   10 cc discarded  Oral Contrast: NONE  Complications: None reported at time of study completion    PROCEDURE:  CT of the Chest, Abdomen and Pelvis was performed.  Sagittal and coronal reformats were performed.    FINDINGS:  CHEST:  LUNGS AND LARGE AIRWAYS: Patent central airways. No pulmonary   consolidation. Stable appearance of left lower lobe clustered punctate   nodularity with multiple small calcifications likely reflecting chronic   small airways disease (3, 73).  PLEURA: No pleural effusion.  VESSELS: Atherosclerotic changes of the aorta and coronary arteries.  HEART: Heart size is normal. No pericardial effusion. Mitral valve   annular calcification.  MEDIASTINUM AND SIOMARA: No lymphadenopathy.  CHEST WALL AND LOWER NECK:  Small hypodense thyroid nodules.    ABDOMEN AND PELVIS:  LIVER: Within normal limits.  BILE DUCTS: Redemonstrated intrahepatic and CBD dilatation, with CBD   measuring up to 1.3 cm. New 9 mm focal rounded density within the distal   CBD (3, 120 and 5, 45), most compatible with stone.  GALLBLADDER: Cholecystectomy.  SPLEEN: Within normal limits.  PANCREAS: Pancreatic body calcification again noted and likely related to   sequela of chronic pancreatitis. Focus of air within the pancreatic duct   (2, 112), presumably related to prior instrumentation.  ADRENALS: Within normal limits.  KIDNEYS/URETERS: No hydronephrosis or obstructing stone. Symmetric renal   enhancement. Nonspecific mild bilateral perinephric stranding.    BLADDER: Diffuse bladder wall thickening though under distended.  REPRODUCTIVE ORGANS: Uterus and right adnexa unremarkable. 1.4 segment   left ovarian cyst.    BOWEL: No bowel obstruction. Appendix is not visualized. No evidence of   inflammation in the pericecal region. Prominent rectal stool burden. Mild   wall thickening of the transverse, descending and sigmoid colon. Mild   diffuse gastric wall thickening. Duodenal diverticulum. Mild colonic   diverticulosis.  PERITONEUM: No ascites.  VESSELS: Atherosclerotic changes.  RETROPERITONEUM/LYMPH NODES: No lymphadenopathy.  ABDOMINAL WALL: Mild nonspecific subcutaneous infiltration in the left   ventral abdominal wall.  BONES: Degenerative changes.    IMPRESSION:    1. New stone within the distal CBD. Intrahepatic and CBD dilatation   similar to prior exam.    2. Diffuse bladder wall thickening though under distended. Nonspecific   mild bilateral perinephric stranding. Recommend correlation with   urinalysis.    3. Mild wall thickening of the transverse through sigmoid colon may   reflect underdistention or colitis. Clinical correlation recommended.    4. Mild diffuse gastric wall thickening could reflect underdistention or   gastritis. Recommend clinical correlation and follow-up endoscopy as   warranted.        --- End of Report ---            NEGRITA ARRIAGA MD; Attending Radiologist  This document has been electronically signed. May 30 2024  3:44AM  05-30-24 @ 03:35          < from: EGD w/ ERCP (03.10.22 @ 13:11) >    Biliary and pancreatic duct stent was successfully removed using a rat tooth      Impressions:      Normal esophagus.      Erosions in the stomach compatible with erosive gastritis.      Normal mucosa in the whole examined duodenum.       < end of copied text >        < from: ERCP (01.18.22 @ 12:55) >      ERCP Findings:      radiograph was taken. Limited views of the esophagus, stomach and duodenum    were unremarkable. The major papilla was noted in the second portion of duodenum    and appeared normal.  The major papilla was cannulated, using wire-guided    cannulation, using a [employer] sphinctertome preloaded with a 0.035    guidewire, initially the pancreatic duct was cannulated. The guidewire was    withdrawn. Bile duct cannulation was attempted. However it could not be    achieved. Then the pancreatic duct was recannulated and the guidewire was left    in place. Then using the second guidewire, biliary cannulation was attempted.    Repeated pancreatic duct cannulation was achieved. Then with the sphincterotome,    transpancreatic sphincterotomy was performed. Then the guidewire was withdrawn    and biliary cannulation was performed. Cholangiogram was performed confirming    bile duct cannulation. There was biliary ductal dilation noted. No contrast    extravasation was noted. No radiopaque filling defects were noted. Then the    biliary sphincterotomy was performed. No bleeding was noted. Contrast and pus    started to flow. Using the 9 to 12 mm balloon tipped catheter, balloon sweeps    were performed. Bile duct irrigation was performed with sterile water. Then the    decided to place a bile duct and the pancreatic duct stent. 5 [place] 6 cm single    pigtail pancreatic duct stent was placed to prevent any post ERCP pancreatitis.    Then 7 [place] 3 cm double-pigtail plastic bile duct stent was placed. Diluted    epinephrine was sprayed over the sphincterotomy site.The PD was not injected in    this exam. The scope was then removed and procedure terminated. Post-procedure    xraydid not show air under the diaphragm.      Impressions:      Cholangitis.       Summary:     Biliary sphincterotomy    Biliary stent placement    Pancreatic duct stent placement    < end of copied text >               Chief Complaint:  Patient is a 73y old  Female who presents with a chief complaint of weakness       Patient is a 73y old  Female who presents with a chief complaint of weakness     HPI: 74 y/o F with PMHX DM, HTN, HLD, pancreatitis, cholangitis presents to ED c/o weakness, epigastric abdominal pain and nausea/vomiting. Patient notes the symptoms started in the afternoon yesterday. She was febrile to 101.4F upon arrival to the ED. Denies hematemesis, hematochezia, melena, diarrhea. She has a history of cholangitis. In January 2022 she had an ERCP that showed cholangitis, biliary sphincterotomy and stent placement performed, PD stent placed; these were subsequently removed in March 2022. CT A/P showed new stone within distal CBD, intrahepatic and CBD dilatation, mild wall thickening of transverse colon through sigmoid colon (underdistention vs. colitis), mild diffuse gastric wall thickening (underdistention vs. gastritis). No prior colonoscopy.       PAST MEDICAL & SURGICAL HISTORY:  Diabetes      HTN (hypertension)      Endogenous hyperlipemia      Herniated lumbar intervertebral disc      Pancreatitis      S/P appendectomy      S/P cholecystectomy      S/P tonsillectomy      Status post cataract extraction  left eye 8/3/2015            MEDICATIONS:   MEDICATIONS  (STANDING):  atorvastatin 10 milliGRAM(s) Oral at bedtime  dextrose 10% Bolus 125 milliLiter(s) IV Bolus once  dextrose 5% + lactated ringers. 1000 milliLiter(s) (100 mL/Hr) IV Continuous <Continuous>  dextrose 5%. 1000 milliLiter(s) (50 mL/Hr) IV Continuous <Continuous>  dextrose 5%. 1000 milliLiter(s) (100 mL/Hr) IV Continuous <Continuous>  dextrose 50% Injectable 25 Gram(s) IV Push once  dextrose 50% Injectable 12.5 Gram(s) IV Push once  glucagon  Injectable 1 milliGRAM(s) IntraMuscular once  insulin lispro (ADMELOG) corrective regimen sliding scale   SubCutaneous three times a day before meals  metoprolol succinate ER 50 milliGRAM(s) Oral daily  pantoprazole    Tablet 40 milliGRAM(s) Oral before breakfast  piperacillin/tazobactam IVPB.. 3.375 Gram(s) IV Intermittent every 8 hours  saccharomyces boulardii 250 milliGRAM(s) Oral two times a day    MEDICATIONS  (PRN):  acetaminophen     Tablet .. 650 milliGRAM(s) Oral every 6 hours PRN Temp greater or equal to 38C (100.4F), Mild Pain (1 - 3)  ALPRAZolam 0.25 milliGRAM(s) Oral at bedtime PRN anxiety/insomnia  aluminum hydroxide/magnesium hydroxide/simethicone Suspension 30 milliLiter(s) Oral every 4 hours PRN Dyspepsia  dextrose Oral Gel 15 Gram(s) Oral once PRN Blood Glucose LESS THAN 70 milliGRAM(s)/deciliter  melatonin 3 milliGRAM(s) Oral at bedtime PRN Insomnia  ondansetron Injectable 4 milliGRAM(s) IV Push every 8 hours PRN Nausea and/or Vomiting  oxyCODONE    IR 5 milliGRAM(s) Oral four times a day PRN Moderate Pain (4 - 6)          DIET:  Diet, NPO after Midnight:      NPO Start Date: 30-May-2024,   NPO Start Time: 23:59  Except Medications (05-30-24 @ 09:53) [Active]  Diet, Consistent Carbohydrate Clear Liquid (05-30-24 @ 08:09) [Active]  Diet, Consistent Carbohydrate/No Snacks (05-30-24 @ 08:04) [Available for Activation]  Diet, Consistent Carbohydrate Full Liquid (05-30-24 @ 08:04) [Available for Activation]          ALLERGIES:   Allergies    penicillin (Anaphylaxis)  Keflex (Anaphylaxis)    Intolerances        Substance Use:   (  ) never used  (  ) other:  Tobacco Usage:  (   ) never smoked   (   ) former smoker   (   ) current smoker  (     ) pack year  (        ) last cigarette date  Alcohol Usage:      Health Management  Last Colonoscopy:  Last Endoscopy:     VITAL SIGNS:   Vital Signs Last 24 Hrs  T(C): 36.6 (30 May 2024 03:38), Max: 38.6 (29 May 2024 20:56)  T(F): 97.9 (30 May 2024 03:38), Max: 101.4 (29 May 2024 20:56)  HR: 84 (30 May 2024 03:38) (84 - 102)  BP: 131/56 (30 May 2024 03:38) (131/56 - 157/81)  BP(mean): --  RR: 18 (30 May 2024 03:38) (18 - 20)  SpO2: 97% (30 May 2024 03:38) (88% - 98%)    Parameters below as of 30 May 2024 03:38  Patient On (Oxygen Delivery Method): room air      I&O's Summary      PHYSICAL EXAM:   GENERAL:  No acute distress  HEENT:  NC/AT, conjunctiva clear, sclera anicteric  CHEST:  No increased effort  HEART:  Regular rate  ABDOMEN:  Soft, non-tender, non-distended, no rebound or guarding  EXTREMITIES: No edema  SKIN:  Warm, dry  NEURO:  Calm, cooperative    LABS:                        11.4   14.82 )-----------( 232      ( 29 May 2024 23:10 )             33.3     Hemoglobin: 11.4 g/dL (05-29-24 @ 23:10)    05-29    139  |  96  |  26.8<H>  ----------------------------<  279<H>  3.5   |  28.0  |  1.13    Ca    9.9      29 May 2024 23:10    TPro  7.3  /  Alb  4.1  /  TBili  2.0  /  DBili  x   /  AST  852<H>  /  ALT  546<H>  /  AlkPhos  266<H>  05-29    LIVER FUNCTIONS - ( 29 May 2024 23:10 )  Alb: 4.1 g/dL / Pro: 7.3 g/dL / ALK PHOS: 266 U/L / ALT: 546 U/L / AST: 852 U/L / GGT: x             PT/INR - ( 29 May 2024 23:10 )   PT: 11.0 sec;   INR: 0.99 ratio         PTT - ( 29 May 2024 23:10 )  PTT:25.3 sec    Lipase: 15 U/L (05-29-24 @ 23:10)                RADIOLOGY & ADDITIONAL STUDIES:      ACC: [id number] EXAM:  CT ABDOMEN AND PELVIS IC   ORDERED BY: ADAIR GRULLON     ACC: [id number] EXAM:  CT CHEST IC   ORDERED BY: ADAIR GRULLON     PROCEDURE DATE:  05/30/2024          INTERPRETATION:  CLINICAL INFORMATION: Sepsis.    COMPARISON: 1/19/2022 abdominal MRI, 1/16/2022 abdominal CT    CONTRAST/COMPLICATIONS:  IV Contrast: Omnipaque 350  90 cc administered   10 cc discarded  Oral Contrast: NONE  Complications: None reported at time of study completion    PROCEDURE:  CT of the Chest, Abdomen and Pelvis was performed.  Sagittal and coronal reformats were performed.    FINDINGS:  CHEST:  LUNGS AND LARGE AIRWAYS: Patent central airways. No pulmonary   consolidation. Stable appearance of left lower lobe clustered punctate   nodularity with multiple small calcifications likely reflecting chronic   small airways disease (3, 73).  PLEURA: No pleural effusion.  VESSELS: Atherosclerotic changes of the aorta and coronary arteries.  HEART: Heart size is normal. No pericardial effusion. Mitral valve   annular calcification.  MEDIASTINUM AND SIOMARA: No lymphadenopathy.  CHEST WALL AND LOWER NECK:  Small hypodense thyroid nodules.    ABDOMEN AND PELVIS:  LIVER: Within normal limits.  BILE DUCTS: Redemonstrated intrahepatic and CBD dilatation, with CBD   measuring up to 1.3 cm. New 9 mm focal rounded density within the distal   CBD (3, 120 and 5, 45), most compatible with stone.  GALLBLADDER: Cholecystectomy.  SPLEEN: Within normal limits.  PANCREAS: Pancreatic body calcification again noted and likely related to   sequela of chronic pancreatitis. Focus of air within the pancreatic duct   (2, 112), presumably related to prior instrumentation.  ADRENALS: Within normal limits.  KIDNEYS/URETERS: No hydronephrosis or obstructing stone. Symmetric renal   enhancement. Nonspecific mild bilateral perinephric stranding.    BLADDER: Diffuse bladder wall thickening though under distended.  REPRODUCTIVE ORGANS: Uterus and right adnexa unremarkable. 1.4 segment   left ovarian cyst.    BOWEL: No bowel obstruction. Appendix is not visualized. No evidence of   inflammation in the pericecal region. Prominent rectal stool burden. Mild   wall thickening of the transverse, descending and sigmoid colon. Mild   diffuse gastric wall thickening. Duodenal diverticulum. Mild colonic   diverticulosis.  PERITONEUM: No ascites.  VESSELS: Atherosclerotic changes.  RETROPERITONEUM/LYMPH NODES: No lymphadenopathy.  ABDOMINAL WALL: Mild nonspecific subcutaneous infiltration in the left   ventral abdominal wall.  BONES: Degenerative changes.    IMPRESSION:    1. New stone within the distal CBD. Intrahepatic and CBD dilatation   similar to prior exam.    2. Diffuse bladder wall thickening though under distended. Nonspecific   mild bilateral perinephric stranding. Recommend correlation with   urinalysis.    3. Mild wall thickening of the transverse through sigmoid colon may   reflect underdistention or colitis. Clinical correlation recommended.    4. Mild diffuse gastric wall thickening could reflect underdistention or   gastritis. Recommend clinical correlation and follow-up endoscopy as   warranted.        --- End of Report ---            NEGRITA ARRIAGA MD; Attending Radiologist  This document has been electronically signed. May 30 2024  3:44AM  05-30-24 @ 03:35          < from: EGD w/ ERCP (03.10.22 @ 13:11) >    Biliary and pancreatic duct stent was successfully removed using a rat tooth      Impressions:      Normal esophagus.      Erosions in the stomach compatible with erosive gastritis.      Normal mucosa in the whole examined duodenum.       < end of copied text >        < from: ERCP (01.18.22 @ 12:55) >      ERCP Findings:      radiograph was taken. Limited views of the esophagus, stomach and duodenum    were unremarkable. The major papilla was noted in the second portion of duodenum    and appeared normal.  The major papilla was cannulated, using wire-guided    cannulation, using a [employer] sphinctertome preloaded with a 0.035    guidewire, initially the pancreatic duct was cannulated. The guidewire was    withdrawn. Bile duct cannulation was attempted. However it could not be    achieved. Then the pancreatic duct was recannulated and the guidewire was left    in place. Then using the second guidewire, biliary cannulation was attempted.    Repeated pancreatic duct cannulation was achieved. Then with the sphincterotome,    transpancreatic sphincterotomy was performed. Then the guidewire was withdrawn    and biliary cannulation was performed. Cholangiogram was performed confirming    bile duct cannulation. There was biliary ductal dilation noted. No contrast    extravasation was noted. No radiopaque filling defects were noted. Then the    biliary sphincterotomy was performed. No bleeding was noted. Contrast and pus    started to flow. Using the 9 to 12 mm balloon tipped catheter, balloon sweeps    were performed. Bile duct irrigation was performed with sterile water. Then the    decided to place a bile duct and the pancreatic duct stent. 5 [place] citizen 6 cm single    pigtail pancreatic duct stent was placed to prevent any post ERCP pancreatitis.    Then 7 [place] citizen 3 cm double-pigtail plastic bile duct stent was placed. Diluted    epinephrine was sprayed over the sphincterotomy site.The PD was not injected in    this exam. The scope was then removed and procedure terminated. Post-procedure    xraydid not show air under the diaphragm.      Impressions:      Cholangitis.       Summary:     Biliary sphincterotomy    Biliary stent placement    Pancreatic duct stent placement    < end of copied text >

## 2024-05-31 ENCOUNTER — TRANSCRIPTION ENCOUNTER (OUTPATIENT)
Age: 74
End: 2024-05-31

## 2024-05-31 LAB
-  BLOOD PCR PANEL: SIGNIFICANT CHANGE UP
A1C WITH ESTIMATED AVERAGE GLUCOSE RESULT: 8 % — HIGH (ref 4–5.6)
ALBUMIN SERPL ELPH-MCNC: 3.2 G/DL — LOW (ref 3.3–5.2)
ALP SERPL-CCNC: 192 U/L — HIGH (ref 40–120)
ALT FLD-CCNC: 340 U/L — HIGH
ANION GAP SERPL CALC-SCNC: 12 MMOL/L — SIGNIFICANT CHANGE UP (ref 5–17)
AST SERPL-CCNC: 220 U/L — HIGH
BILIRUB SERPL-MCNC: 3.6 MG/DL — HIGH (ref 0.4–2)
BLD GP AB SCN SERPL QL: SIGNIFICANT CHANGE UP
BUN SERPL-MCNC: 15.7 MG/DL — SIGNIFICANT CHANGE UP (ref 8–20)
CALCIUM SERPL-MCNC: 9.3 MG/DL — SIGNIFICANT CHANGE UP (ref 8.4–10.5)
CHLORIDE SERPL-SCNC: 98 MMOL/L — SIGNIFICANT CHANGE UP (ref 96–108)
CO2 SERPL-SCNC: 27 MMOL/L — SIGNIFICANT CHANGE UP (ref 22–29)
CREAT SERPL-MCNC: 1.09 MG/DL — SIGNIFICANT CHANGE UP (ref 0.5–1.3)
CULTURE RESULTS: SIGNIFICANT CHANGE UP
EGFR: 54 ML/MIN/1.73M2 — LOW
ESTIMATED AVERAGE GLUCOSE: 183 MG/DL — HIGH (ref 68–114)
GLUCOSE BLDC GLUCOMTR-MCNC: 206 MG/DL — HIGH (ref 70–99)
GLUCOSE BLDC GLUCOMTR-MCNC: 227 MG/DL — HIGH (ref 70–99)
GLUCOSE BLDC GLUCOMTR-MCNC: 229 MG/DL — HIGH (ref 70–99)
GLUCOSE BLDC GLUCOMTR-MCNC: 231 MG/DL — HIGH (ref 70–99)
GLUCOSE BLDC GLUCOMTR-MCNC: 286 MG/DL — HIGH (ref 70–99)
GLUCOSE SERPL-MCNC: 170 MG/DL — HIGH (ref 70–99)
GRAM STN FLD: ABNORMAL
HCT VFR BLD CALC: 28.1 % — LOW (ref 34.5–45)
HGB BLD-MCNC: 9.5 G/DL — LOW (ref 11.5–15.5)
INR BLD: 1.03 RATIO — SIGNIFICANT CHANGE UP (ref 0.85–1.18)
MCHC RBC-ENTMCNC: 31 PG — SIGNIFICANT CHANGE UP (ref 27–34)
MCHC RBC-ENTMCNC: 33.8 GM/DL — SIGNIFICANT CHANGE UP (ref 32–36)
MCV RBC AUTO: 91.8 FL — SIGNIFICANT CHANGE UP (ref 80–100)
METHOD TYPE: SIGNIFICANT CHANGE UP
PLATELET # BLD AUTO: 192 K/UL — SIGNIFICANT CHANGE UP (ref 150–400)
POTASSIUM SERPL-MCNC: 3.7 MMOL/L — SIGNIFICANT CHANGE UP (ref 3.5–5.3)
POTASSIUM SERPL-SCNC: 3.7 MMOL/L — SIGNIFICANT CHANGE UP (ref 3.5–5.3)
PROT SERPL-MCNC: 6.1 G/DL — LOW (ref 6.6–8.7)
PROTHROM AB SERPL-ACNC: 11.4 SEC — SIGNIFICANT CHANGE UP (ref 9.5–13)
RBC # BLD: 3.06 M/UL — LOW (ref 3.8–5.2)
RBC # FLD: 12.4 % — SIGNIFICANT CHANGE UP (ref 10.3–14.5)
SODIUM SERPL-SCNC: 137 MMOL/L — SIGNIFICANT CHANGE UP (ref 135–145)
SPECIMEN SOURCE: SIGNIFICANT CHANGE UP
WBC # BLD: 10.24 K/UL — SIGNIFICANT CHANGE UP (ref 3.8–10.5)
WBC # FLD AUTO: 10.24 K/UL — SIGNIFICANT CHANGE UP (ref 3.8–10.5)

## 2024-05-31 PROCEDURE — 43264 ERCP REMOVE DUCT CALCULI: CPT

## 2024-05-31 PROCEDURE — 99233 SBSQ HOSP IP/OBS HIGH 50: CPT

## 2024-05-31 DEVICE — SPHINCTEROTOME CLEVERCUT 3V STR 4.4FR 7X20MM
Type: IMPLANTABLE DEVICE | Status: NON-FUNCTIONAL
Removed: 2024-05-31

## 2024-05-31 DEVICE — CATH BLLN EXTRACT DIST GUIDE WIRE 15MM 3LUM
Type: IMPLANTABLE DEVICE | Status: NON-FUNCTIONAL
Removed: 2024-05-31

## 2024-05-31 RX ORDER — INSULIN LISPRO 100/ML
VIAL (ML) SUBCUTANEOUS
Refills: 0 | Status: DISCONTINUED | OUTPATIENT
Start: 2024-05-31 | End: 2024-06-03

## 2024-05-31 RX ADMIN — PIPERACILLIN AND TAZOBACTAM 25 GRAM(S): 4; .5 INJECTION, POWDER, LYOPHILIZED, FOR SOLUTION INTRAVENOUS at 13:51

## 2024-05-31 RX ADMIN — Medication 2: at 18:44

## 2024-05-31 RX ADMIN — ATORVASTATIN CALCIUM 10 MILLIGRAM(S): 80 TABLET, FILM COATED ORAL at 21:29

## 2024-05-31 RX ADMIN — Medication 250 MILLIGRAM(S): at 05:58

## 2024-05-31 RX ADMIN — Medication 3 MILLIGRAM(S): at 00:29

## 2024-05-31 RX ADMIN — OXYCODONE HYDROCHLORIDE 5 MILLIGRAM(S): 5 TABLET ORAL at 06:05

## 2024-05-31 RX ADMIN — Medication 50 MILLIGRAM(S): at 05:58

## 2024-05-31 RX ADMIN — Medication 1 CAPSULE(S): at 18:44

## 2024-05-31 RX ADMIN — Medication 3: at 21:30

## 2024-05-31 RX ADMIN — OXYCODONE HYDROCHLORIDE 5 MILLIGRAM(S): 5 TABLET ORAL at 14:58

## 2024-05-31 RX ADMIN — SODIUM CHLORIDE 100 MILLILITER(S): 9 INJECTION, SOLUTION INTRAVENOUS at 05:58

## 2024-05-31 RX ADMIN — OXYCODONE HYDROCHLORIDE 5 MILLIGRAM(S): 5 TABLET ORAL at 00:29

## 2024-05-31 RX ADMIN — Medication 100 MILLIGRAM(S): at 09:26

## 2024-05-31 RX ADMIN — OXYCODONE HYDROCHLORIDE 5 MILLIGRAM(S): 5 TABLET ORAL at 21:29

## 2024-05-31 RX ADMIN — PIPERACILLIN AND TAZOBACTAM 25 GRAM(S): 4; .5 INJECTION, POWDER, LYOPHILIZED, FOR SOLUTION INTRAVENOUS at 05:58

## 2024-05-31 RX ADMIN — Medication 2: at 13:51

## 2024-05-31 RX ADMIN — Medication 250 MILLIGRAM(S): at 18:44

## 2024-05-31 RX ADMIN — INSULIN GLARGINE 3 UNIT(S): 100 INJECTION, SOLUTION SUBCUTANEOUS at 21:30

## 2024-05-31 RX ADMIN — PANTOPRAZOLE SODIUM 40 MILLIGRAM(S): 20 TABLET, DELAYED RELEASE ORAL at 06:05

## 2024-05-31 RX ADMIN — OXYCODONE HYDROCHLORIDE 5 MILLIGRAM(S): 5 TABLET ORAL at 22:30

## 2024-05-31 RX ADMIN — PIPERACILLIN AND TAZOBACTAM 25 GRAM(S): 4; .5 INJECTION, POWDER, LYOPHILIZED, FOR SOLUTION INTRAVENOUS at 21:32

## 2024-05-31 NOTE — PROGRESS NOTE ADULT - ASSESSMENT
74yo female with hx of HTN, HLD, T2DM, chronic lower back pain, multiple episodes of pancreatitis, GERD who presents to the ED with abdominal pain associated with nausea and NBNB emesis for last 2 days. She states pain improved since coming to the ED but has RUQ pain on deep palpation. Temp 101.4 on arrival. WBC 14.8. TIMOTHY, ALT//852, bili 2.0. CT with intrahepatic and CBD dilation. GI consulted and planning for ERCP, Pt admitted to medicine for further management.     Sepsis 2/2 choledocholithiasis and cholangitis +/- cystitis/pyelonephritis  - CT a/p with new stone in CBD with CBD and intrahepatic duct dilations  - also showed bladder wall thickening and mild bilateral perinephric stranding   - UA without bacteria, WBC, nitrites or LE   - Continue Zosyn 3.375g q8h  - GI recs appreciated  - Blood cultures negative  - s/p ERCP with slude/stone removal  - DC on abx if tolerating diet     TIMOTHY   - likely 2/2 dehydration and ATN  - IVF  - Monitor BMP    HTN/HLD  - hold ramipril for timothy  - Toprol XL 50mg daily  - Lipitor 10mg nightly    T2DM  - FS with ISS  - Lantus 3 units daily  - Lantus 3 units nightly     GERD/gastritis  - protonix    DVT ppx  - SCD

## 2024-06-01 LAB
ALBUMIN SERPL ELPH-MCNC: 3.5 G/DL — SIGNIFICANT CHANGE UP (ref 3.3–5.2)
ALP SERPL-CCNC: 208 U/L — HIGH (ref 40–120)
ALT FLD-CCNC: 253 U/L — HIGH
ANION GAP SERPL CALC-SCNC: 16 MMOL/L — SIGNIFICANT CHANGE UP (ref 5–17)
AST SERPL-CCNC: 124 U/L — HIGH
BASOPHILS # BLD AUTO: 0.02 K/UL — SIGNIFICANT CHANGE UP (ref 0–0.2)
BASOPHILS NFR BLD AUTO: 0.2 % — SIGNIFICANT CHANGE UP (ref 0–2)
BILIRUB DIRECT SERPL-MCNC: 2.2 MG/DL — HIGH (ref 0–0.3)
BILIRUB INDIRECT FLD-MCNC: 1 MG/DL — SIGNIFICANT CHANGE UP (ref 0.2–1)
BILIRUB SERPL-MCNC: 3.2 MG/DL — HIGH (ref 0.4–2)
BUN SERPL-MCNC: 23.6 MG/DL — HIGH (ref 8–20)
CALCIUM SERPL-MCNC: 9.7 MG/DL — SIGNIFICANT CHANGE UP (ref 8.4–10.5)
CHLORIDE SERPL-SCNC: 97 MMOL/L — SIGNIFICANT CHANGE UP (ref 96–108)
CO2 SERPL-SCNC: 24 MMOL/L — SIGNIFICANT CHANGE UP (ref 22–29)
CREAT SERPL-MCNC: 1.57 MG/DL — HIGH (ref 0.5–1.3)
E COLI DNA BLD POS QL NAA+NON-PROBE: SIGNIFICANT CHANGE UP
EGFR: 35 ML/MIN/1.73M2 — LOW
EOSINOPHIL # BLD AUTO: 0.01 K/UL — SIGNIFICANT CHANGE UP (ref 0–0.5)
EOSINOPHIL NFR BLD AUTO: 0.1 % — SIGNIFICANT CHANGE UP (ref 0–6)
GLUCOSE BLDC GLUCOMTR-MCNC: 212 MG/DL — HIGH (ref 70–99)
GLUCOSE BLDC GLUCOMTR-MCNC: 244 MG/DL — HIGH (ref 70–99)
GLUCOSE BLDC GLUCOMTR-MCNC: 298 MG/DL — HIGH (ref 70–99)
GLUCOSE BLDC GLUCOMTR-MCNC: 348 MG/DL — HIGH (ref 70–99)
GLUCOSE SERPL-MCNC: 202 MG/DL — HIGH (ref 70–99)
GRAM STN FLD: ABNORMAL
HCT VFR BLD CALC: 32.8 % — LOW (ref 34.5–45)
HGB BLD-MCNC: 11.2 G/DL — LOW (ref 11.5–15.5)
IMM GRANULOCYTES NFR BLD AUTO: 0.6 % — SIGNIFICANT CHANGE UP (ref 0–0.9)
LYMPHOCYTES # BLD AUTO: 1.32 K/UL — SIGNIFICANT CHANGE UP (ref 1–3.3)
LYMPHOCYTES # BLD AUTO: 11.4 % — LOW (ref 13–44)
MCHC RBC-ENTMCNC: 31.1 PG — SIGNIFICANT CHANGE UP (ref 27–34)
MCHC RBC-ENTMCNC: 34.1 GM/DL — SIGNIFICANT CHANGE UP (ref 32–36)
MCV RBC AUTO: 91.1 FL — SIGNIFICANT CHANGE UP (ref 80–100)
METHOD TYPE: SIGNIFICANT CHANGE UP
MONOCYTES # BLD AUTO: 0.83 K/UL — SIGNIFICANT CHANGE UP (ref 0–0.9)
MONOCYTES NFR BLD AUTO: 7.2 % — SIGNIFICANT CHANGE UP (ref 2–14)
NEUTROPHILS # BLD AUTO: 9.31 K/UL — HIGH (ref 1.8–7.4)
NEUTROPHILS NFR BLD AUTO: 80.5 % — HIGH (ref 43–77)
PLATELET # BLD AUTO: 220 K/UL — SIGNIFICANT CHANGE UP (ref 150–400)
POTASSIUM SERPL-MCNC: 4.7 MMOL/L — SIGNIFICANT CHANGE UP (ref 3.5–5.3)
POTASSIUM SERPL-SCNC: 4.7 MMOL/L — SIGNIFICANT CHANGE UP (ref 3.5–5.3)
PROT SERPL-MCNC: 6.7 G/DL — SIGNIFICANT CHANGE UP (ref 6.6–8.7)
RBC # BLD: 3.6 M/UL — LOW (ref 3.8–5.2)
RBC # FLD: 12 % — SIGNIFICANT CHANGE UP (ref 10.3–14.5)
SODIUM SERPL-SCNC: 137 MMOL/L — SIGNIFICANT CHANGE UP (ref 135–145)
WBC # BLD: 11.56 K/UL — HIGH (ref 3.8–10.5)
WBC # FLD AUTO: 11.56 K/UL — HIGH (ref 3.8–10.5)

## 2024-06-01 PROCEDURE — 99233 SBSQ HOSP IP/OBS HIGH 50: CPT

## 2024-06-01 PROCEDURE — 99232 SBSQ HOSP IP/OBS MODERATE 35: CPT

## 2024-06-01 RX ORDER — SODIUM CHLORIDE 9 MG/ML
1000 INJECTION INTRAMUSCULAR; INTRAVENOUS; SUBCUTANEOUS
Refills: 0 | Status: DISCONTINUED | OUTPATIENT
Start: 2024-06-01 | End: 2024-06-03

## 2024-06-01 RX ORDER — MEROPENEM 1 G/30ML
1000 INJECTION INTRAVENOUS EVERY 12 HOURS
Refills: 0 | Status: DISCONTINUED | OUTPATIENT
Start: 2024-06-01 | End: 2024-06-03

## 2024-06-01 RX ORDER — MEROPENEM 1 G/30ML
INJECTION INTRAVENOUS
Refills: 0 | Status: DISCONTINUED | OUTPATIENT
Start: 2024-06-01 | End: 2024-06-01

## 2024-06-01 RX ADMIN — Medication 250 MILLIGRAM(S): at 18:00

## 2024-06-01 RX ADMIN — OXYCODONE HYDROCHLORIDE 5 MILLIGRAM(S): 5 TABLET ORAL at 16:35

## 2024-06-01 RX ADMIN — MEROPENEM 1000 MILLIGRAM(S): 1 INJECTION INTRAVENOUS at 15:29

## 2024-06-01 RX ADMIN — SODIUM CHLORIDE 100 MILLILITER(S): 9 INJECTION INTRAMUSCULAR; INTRAVENOUS; SUBCUTANEOUS at 15:29

## 2024-06-01 RX ADMIN — Medication 50 MILLIGRAM(S): at 05:47

## 2024-06-01 RX ADMIN — Medication 1 CAPSULE(S): at 18:00

## 2024-06-01 RX ADMIN — Medication 2: at 21:08

## 2024-06-01 RX ADMIN — Medication 4: at 18:01

## 2024-06-01 RX ADMIN — PIPERACILLIN AND TAZOBACTAM 25 GRAM(S): 4; .5 INJECTION, POWDER, LYOPHILIZED, FOR SOLUTION INTRAVENOUS at 05:46

## 2024-06-01 RX ADMIN — Medication 250 MILLIGRAM(S): at 05:47

## 2024-06-01 RX ADMIN — OXYCODONE HYDROCHLORIDE 5 MILLIGRAM(S): 5 TABLET ORAL at 05:54

## 2024-06-01 RX ADMIN — OXYCODONE HYDROCHLORIDE 5 MILLIGRAM(S): 5 TABLET ORAL at 15:36

## 2024-06-01 RX ADMIN — INSULIN GLARGINE 3 UNIT(S): 100 INJECTION, SOLUTION SUBCUTANEOUS at 08:59

## 2024-06-01 RX ADMIN — Medication 1 CAPSULE(S): at 12:04

## 2024-06-01 RX ADMIN — INSULIN GLARGINE 3 UNIT(S): 100 INJECTION, SOLUTION SUBCUTANEOUS at 21:08

## 2024-06-01 RX ADMIN — PANTOPRAZOLE SODIUM 40 MILLIGRAM(S): 20 TABLET, DELAYED RELEASE ORAL at 05:47

## 2024-06-01 RX ADMIN — Medication 3: at 12:04

## 2024-06-01 RX ADMIN — OXYCODONE HYDROCHLORIDE 5 MILLIGRAM(S): 5 TABLET ORAL at 22:15

## 2024-06-01 RX ADMIN — ATORVASTATIN CALCIUM 10 MILLIGRAM(S): 80 TABLET, FILM COATED ORAL at 21:05

## 2024-06-01 RX ADMIN — Medication 1 CAPSULE(S): at 09:00

## 2024-06-01 RX ADMIN — OXYCODONE HYDROCHLORIDE 5 MILLIGRAM(S): 5 TABLET ORAL at 21:44

## 2024-06-01 RX ADMIN — Medication 2: at 08:03

## 2024-06-01 RX ADMIN — PIPERACILLIN AND TAZOBACTAM 25 GRAM(S): 4; .5 INJECTION, POWDER, LYOPHILIZED, FOR SOLUTION INTRAVENOUS at 14:33

## 2024-06-01 NOTE — PROGRESS NOTE ADULT - ASSESSMENT
74 y/o F with PMHX DM, HTN, HLD, pancreatitis, cholangitis presents to ED c/o weakness, abdominal pain and nausea/vomiting.     #Choledocholithiasis   #Cholangitis  CT A/P w/ IV Cont (05.30.24)- new stone within distal CBD, intrahepatic and CBD dilatation, mild wall thickening of transverse colon through sigmoid colon (underdistention vs. colitis), mild diffuse gastric wall thickening (underdistention vs. gastritis).   1/2022 ERCP- cholangitis, biliary sphincterotomy and stent placement performed, PD stent placed; these were subsequently removed in 3/2022  ERCP (05.31.24)- Brown stones were removed from the bile duct    - Start ursodiol 300mg BID   - Diet as tolerated   - Trend CBC, CMP, coags  - Monitor for fever, abdominal pain   - Continue IV antibiotics, recommend infectious disease consult for bacteremia   - PPI QD   - Eventual outpatient screening colonoscopy as patient has never had   - Outpatient follow up with Dr. Miller in office in 3 months   _________________________________________________________________  Assessment and recommendations are final when note is signed by the attending physician.

## 2024-06-01 NOTE — PROGRESS NOTE ADULT - NS ATTEND AMEND GEN_ALL_CORE FT
Patient seen and examined status post ERCP.  Doing very well.  If she is doing okay, please prepare for discharge with course of antibiotics as per the ID team.  Will follow-up in 3 to 4 months for consideration for repeat ERCP to rule out any biliary stasis or ampullary stenosis leading to recurrent stones.  Ursodiol 300 mg p.o. twice daily.

## 2024-06-01 NOTE — PROGRESS NOTE ADULT - ASSESSMENT
73 yr old female with hypertension, hyperlipidemia, diabetes mellitus, chronic lower back pain, prior pancreatitis requiring ERCP and biliary stent placement, which was subsequently removed, GERD presented with complaints of nausea, abdominal pain and fever on admission. Noted to have elevated WBC, LFTs and CT abdomen with intra hepatic and CBD dilatation. GI was consulted, empiric Zosyn was initiated and ERCP was scheduled. Cultures sent on admission. She underwent ERCP on 5/31, biliary stones were removed. Post procedure, her diet was advanced. Noted to have E coli bacteremia.    1. Sepsis sec cholangitis and E coli bacteremia:  Sepsis resolving  s/p ERCP and stone removal  monitor LFTs, pain control  Repeat blood cultures  Reviewed chart, had E coli bacteremia in 2022 with choledocholithiasis  change antibiotics to Meropenem  ID evaluation    2. TIMOTHY sec ATN:  Monitor renal function  start IVF    3. Diabetes mellitus:  Continue Lantus 3 units BID  sliding scale coverage    4. H/o pancreatitis:  On Creon TID w/meals    5. Hyperlipidemia, hypertension:  Continue Ator 10 mg QHS and Toprol 50 mg QD    6. DVT ppx:  Lovenox    Discussed with patient and updated spouse Niko.

## 2024-06-01 NOTE — PROVIDER CONTACT NOTE (CRITICAL VALUE NOTIFICATION) - TEST AND RESULT REPORTED:
BC from 29th: Growth in anerobic bottle: gram + cocci in pairs  growth in aerobic bottle: gram - rods

## 2024-06-02 LAB
-  AMPICILLIN: SIGNIFICANT CHANGE UP
-  GENTAMICIN SYNERGY: SIGNIFICANT CHANGE UP
-  STREPTOMYCIN SYNERGY: SIGNIFICANT CHANGE UP
-  VANCOMYCIN: SIGNIFICANT CHANGE UP
ALBUMIN SERPL ELPH-MCNC: 3.1 G/DL — LOW (ref 3.3–5.2)
ALP SERPL-CCNC: 151 U/L — HIGH (ref 40–120)
ALT FLD-CCNC: 145 U/L — HIGH
ANION GAP SERPL CALC-SCNC: 13 MMOL/L — SIGNIFICANT CHANGE UP (ref 5–17)
AST SERPL-CCNC: 43 U/L — HIGH
BASOPHILS # BLD AUTO: 0.02 K/UL — SIGNIFICANT CHANGE UP (ref 0–0.2)
BASOPHILS NFR BLD AUTO: 0.3 % — SIGNIFICANT CHANGE UP (ref 0–2)
BILIRUB DIRECT SERPL-MCNC: 0.5 MG/DL — HIGH (ref 0–0.3)
BILIRUB INDIRECT FLD-MCNC: 0.3 MG/DL — SIGNIFICANT CHANGE UP (ref 0.2–1)
BILIRUB SERPL-MCNC: 0.9 MG/DL — SIGNIFICANT CHANGE UP (ref 0.4–2)
BUN SERPL-MCNC: 22.4 MG/DL — HIGH (ref 8–20)
CALCIUM SERPL-MCNC: 8.6 MG/DL — SIGNIFICANT CHANGE UP (ref 8.4–10.5)
CHLORIDE SERPL-SCNC: 102 MMOL/L — SIGNIFICANT CHANGE UP (ref 96–108)
CO2 SERPL-SCNC: 24 MMOL/L — SIGNIFICANT CHANGE UP (ref 22–29)
CREAT SERPL-MCNC: 1.29 MG/DL — SIGNIFICANT CHANGE UP (ref 0.5–1.3)
EGFR: 44 ML/MIN/1.73M2 — LOW
EOSINOPHIL # BLD AUTO: 0.11 K/UL — SIGNIFICANT CHANGE UP (ref 0–0.5)
EOSINOPHIL NFR BLD AUTO: 1.6 % — SIGNIFICANT CHANGE UP (ref 0–6)
GLUCOSE BLDC GLUCOMTR-MCNC: 183 MG/DL — HIGH (ref 70–99)
GLUCOSE BLDC GLUCOMTR-MCNC: 228 MG/DL — HIGH (ref 70–99)
GLUCOSE BLDC GLUCOMTR-MCNC: 325 MG/DL — HIGH (ref 70–99)
GLUCOSE BLDC GLUCOMTR-MCNC: 345 MG/DL — HIGH (ref 70–99)
GLUCOSE SERPL-MCNC: 211 MG/DL — HIGH (ref 70–99)
HCT VFR BLD CALC: 28.6 % — LOW (ref 34.5–45)
HGB BLD-MCNC: 9.7 G/DL — LOW (ref 11.5–15.5)
IMM GRANULOCYTES NFR BLD AUTO: 0.6 % — SIGNIFICANT CHANGE UP (ref 0–0.9)
LYMPHOCYTES # BLD AUTO: 1.97 K/UL — SIGNIFICANT CHANGE UP (ref 1–3.3)
LYMPHOCYTES # BLD AUTO: 28.1 % — SIGNIFICANT CHANGE UP (ref 13–44)
MAGNESIUM SERPL-MCNC: 1.9 MG/DL — SIGNIFICANT CHANGE UP (ref 1.6–2.6)
MCHC RBC-ENTMCNC: 30.9 PG — SIGNIFICANT CHANGE UP (ref 27–34)
MCHC RBC-ENTMCNC: 33.9 GM/DL — SIGNIFICANT CHANGE UP (ref 32–36)
MCV RBC AUTO: 91.1 FL — SIGNIFICANT CHANGE UP (ref 80–100)
METHOD TYPE: SIGNIFICANT CHANGE UP
MONOCYTES # BLD AUTO: 0.7 K/UL — SIGNIFICANT CHANGE UP (ref 0–0.9)
MONOCYTES NFR BLD AUTO: 10 % — SIGNIFICANT CHANGE UP (ref 2–14)
NEUTROPHILS # BLD AUTO: 4.18 K/UL — SIGNIFICANT CHANGE UP (ref 1.8–7.4)
NEUTROPHILS NFR BLD AUTO: 59.4 % — SIGNIFICANT CHANGE UP (ref 43–77)
PHOSPHATE SERPL-MCNC: 2.9 MG/DL — SIGNIFICANT CHANGE UP (ref 2.4–4.7)
PLATELET # BLD AUTO: 201 K/UL — SIGNIFICANT CHANGE UP (ref 150–400)
POTASSIUM SERPL-MCNC: 3.7 MMOL/L — SIGNIFICANT CHANGE UP (ref 3.5–5.3)
POTASSIUM SERPL-SCNC: 3.7 MMOL/L — SIGNIFICANT CHANGE UP (ref 3.5–5.3)
PROT SERPL-MCNC: 5.8 G/DL — LOW (ref 6.6–8.7)
RBC # BLD: 3.14 M/UL — LOW (ref 3.8–5.2)
RBC # FLD: 12.3 % — SIGNIFICANT CHANGE UP (ref 10.3–14.5)
SODIUM SERPL-SCNC: 139 MMOL/L — SIGNIFICANT CHANGE UP (ref 135–145)
WBC # BLD: 7.02 K/UL — SIGNIFICANT CHANGE UP (ref 3.8–10.5)
WBC # FLD AUTO: 7.02 K/UL — SIGNIFICANT CHANGE UP (ref 3.8–10.5)

## 2024-06-02 PROCEDURE — 99223 1ST HOSP IP/OBS HIGH 75: CPT

## 2024-06-02 PROCEDURE — 99233 SBSQ HOSP IP/OBS HIGH 50: CPT

## 2024-06-02 RX ORDER — INSULIN GLARGINE 100 [IU]/ML
5 INJECTION, SOLUTION SUBCUTANEOUS EVERY MORNING
Refills: 0 | Status: DISCONTINUED | OUTPATIENT
Start: 2024-06-02 | End: 2024-06-03

## 2024-06-02 RX ORDER — URSODIOL 250 MG/1
300 TABLET, FILM COATED ORAL EVERY 12 HOURS
Refills: 0 | Status: DISCONTINUED | OUTPATIENT
Start: 2024-06-02 | End: 2024-06-03

## 2024-06-02 RX ADMIN — Medication 1: at 08:13

## 2024-06-02 RX ADMIN — OXYCODONE HYDROCHLORIDE 5 MILLIGRAM(S): 5 TABLET ORAL at 05:01

## 2024-06-02 RX ADMIN — Medication 4: at 21:35

## 2024-06-02 RX ADMIN — PANTOPRAZOLE SODIUM 40 MILLIGRAM(S): 20 TABLET, DELAYED RELEASE ORAL at 05:02

## 2024-06-02 RX ADMIN — Medication 250 MILLIGRAM(S): at 05:02

## 2024-06-02 RX ADMIN — Medication 2: at 12:07

## 2024-06-02 RX ADMIN — URSODIOL 300 MILLIGRAM(S): 250 TABLET, FILM COATED ORAL at 17:10

## 2024-06-02 RX ADMIN — Medication 1 CAPSULE(S): at 12:07

## 2024-06-02 RX ADMIN — INSULIN GLARGINE 3 UNIT(S): 100 INJECTION, SOLUTION SUBCUTANEOUS at 21:35

## 2024-06-02 RX ADMIN — Medication 250 MILLIGRAM(S): at 17:10

## 2024-06-02 RX ADMIN — OXYCODONE HYDROCHLORIDE 5 MILLIGRAM(S): 5 TABLET ORAL at 17:32

## 2024-06-02 RX ADMIN — Medication 4: at 16:51

## 2024-06-02 RX ADMIN — Medication 50 MILLIGRAM(S): at 05:02

## 2024-06-02 RX ADMIN — MEROPENEM 1000 MILLIGRAM(S): 1 INJECTION INTRAVENOUS at 05:02

## 2024-06-02 RX ADMIN — Medication 1 CAPSULE(S): at 08:06

## 2024-06-02 RX ADMIN — INSULIN GLARGINE 3 UNIT(S): 100 INJECTION, SOLUTION SUBCUTANEOUS at 08:14

## 2024-06-02 RX ADMIN — ATORVASTATIN CALCIUM 10 MILLIGRAM(S): 80 TABLET, FILM COATED ORAL at 21:35

## 2024-06-02 RX ADMIN — OXYCODONE HYDROCHLORIDE 5 MILLIGRAM(S): 5 TABLET ORAL at 18:35

## 2024-06-02 RX ADMIN — OXYCODONE HYDROCHLORIDE 5 MILLIGRAM(S): 5 TABLET ORAL at 06:00

## 2024-06-02 RX ADMIN — MEROPENEM 1000 MILLIGRAM(S): 1 INJECTION INTRAVENOUS at 17:10

## 2024-06-02 RX ADMIN — OXYCODONE HYDROCHLORIDE 5 MILLIGRAM(S): 5 TABLET ORAL at 12:14

## 2024-06-02 RX ADMIN — Medication 1 CAPSULE(S): at 16:52

## 2024-06-02 RX ADMIN — OXYCODONE HYDROCHLORIDE 5 MILLIGRAM(S): 5 TABLET ORAL at 13:14

## 2024-06-02 NOTE — PROGRESS NOTE ADULT - ASSESSMENT
73 yr old female with hypertension, hyperlipidemia, diabetes mellitus, chronic lower back pain, prior pancreatitis requiring ERCP and biliary stent placement, which was subsequently removed, GERD presented with complaints of nausea, abdominal pain and fever on admission. Noted to have elevated WBC, LFTs and CT abdomen with intra hepatic and CBD dilatation. GI was consulted, empiric Zosyn was initiated and ERCP was scheduled. Cultures sent on admission. She underwent ERCP on 5/31, biliary stones were removed. Post procedure, her diet was advanced. Noted to have E coli bacteremia.    1. Sepsis sec cholangitis and E coli bacteremia:  Sepsis resolving  s/p ERCP and stone removal  monitor LFTs, pain control  Repeat blood cultures pending  continue Meropenem  ID evaluation    2. TIMOTHY sec ATN:  resolved  discontinue IVF    3. Diabetes mellitus:  Continue Lantus 3 units QHS and increase Lantus 5 units in am  sliding scale coverage    4. H/o pancreatitis:  On Creon TID w/meals    5. Hyperlipidemia, hypertension:  Continue Ator 10 mg QHS and Toprol 50 mg QD    6. DVT ppx:  Lovenox    Discussed with patient and RN.  73 yr old female with hypertension, hyperlipidemia, diabetes mellitus, chronic lower back pain, prior pancreatitis requiring ERCP and biliary stent placement, which was subsequently removed, GERD presented with complaints of nausea, abdominal pain and fever on admission. Noted to have elevated WBC, LFTs and CT abdomen with intra hepatic and CBD dilatation. GI was consulted, empiric Zosyn was initiated and ERCP was scheduled. Cultures sent on admission. She underwent ERCP on 5/31, biliary stones were removed. Post procedure, her diet was advanced. Noted to have E coli bacteremia.    1. Sepsis sec cholangitis and E coli bacteremia:  Sepsis resolving  s/p ERCP and stone removal  monitor LFTs, pain control  Repeat blood cultures pending  continue Meropenem  ID evaluation    2. TIMOTHY sec ATN:  resolved  discontinue IVF    3. Diabetes mellitus:  Continue Lantus 3 units QHS and increase Lantus 5 units in am  sliding scale coverage    4. H/o pancreatitis:  On Creon TID w/meals    5. Hyperlipidemia, hypertension:  Continue Ator 10 mg QHS and Toprol 50 mg QD    6. DVT ppx:  Lovenox    Discussed with patient and RN.   updated spouse.  anticipate discharge in 24-48 hrs.

## 2024-06-02 NOTE — CONSULT NOTE ADULT - SUBJECTIVE AND OBJECTIVE BOX
INFECTIOUS DISEASES AND INTERNAL MEDICINE at [facility]  =======================================================  Fernando Terrell MD  Diplomates American Board of Internal Medicine and Infectious Diseases  Telephone [phone]  Fax            [phone]  =======================================================    ERIK [id number]      HPI:  74yo female with hx of HTN, HLD, T2DM, chronic lower back pain, multiple episodes of pancreatitis, GERD who presents to the ED with abdominal pain associated with nausea and NBNB emesis for last 2 days. States on Tuesday evening ate dinner and about 2 hours later was having "gas pain" in the epigastric area. She put on a heating pad with gradual resolution of her pain. She went to bed and went to work next day without recurrence of pain. Ate lunch in the afternoon and again had gas pain 2 hr after eating however pain never improved. She placed a heating pad on when she got home but still without relief. 30 minutes later had nausea and vomiting thus decided to call an ambulance and come to the hospital. She denies fever, cough, sore throat, chest pain, SOB, palpitations, diarrhea, constipation, dysuria, LE swelling or pain. (  AS ABOVE ADMITTED WITH ABDOMINAL PAIN  UNDERWENT ERCP FOR CHOLEDOCHOLITHIASIS  BLOOD CX ENTEROCOCCUS AND ECOLI  ASKED TO EVALUATE FROM ID STANDPOINT       PAST MEDICAL & SURGICAL HISTORY:  Diabetes      HTN (hypertension)      Endogenous hyperlipemia      Herniated lumbar intervertebral disc      Pancreatitis      S/P appendectomy      S/P cholecystectomy      S/P tonsillectomy      Status post cataract extraction  left eye 8/3/2015          ANTIBIOTICS  meropenem Injectable 1000 milliGRAM(s) IV Push every 12 hours      Allergies    penicillin (Anaphylaxis)  Keflex (Anaphylaxis)    Intolerances        SOCIAL HISTORY:     FAMILY HX   FAMILY HISTORY:  Family history of CHF (congestive heart failure)        Vital Signs Last 24 Hrs  T(C): 36.5 (02 Jun 2024 10:51), Max: 36.7 (01 Jun 2024 19:51)  T(F): 97.7 (02 Jun 2024 10:51), Max: 98 (01 Jun 2024 19:51)  HR: 69 (02 Jun 2024 10:51) (66 - 74)  BP: 146/70 (02 Jun 2024 10:51) (112/68 - 156/78)  BP(mean): --  RR: 18 (02 Jun 2024 10:51) (18 - 18)  SpO2: 96% (02 Jun 2024 10:51) (96% - 98%)    Parameters below as of 02 Jun 2024 10:51  Patient On (Oxygen Delivery Method): room air      Drug Dosing Weight  Height (cm): 154.9 (10 Dec 2022 12:11)  Weight (kg): 63.5 (29 May 2024 21:22)  BMI (kg/m2): 26.5 (29 May 2024 21:22)  BSA (m2): 1.62 (29 May 2024 21:22)      REVIEW OF SYSTEMS:    CONSTITUTIONAL:  As per HPI.    HEENT:  Eyes:  No diplopia or blurred vision. ENT:  No earache, sore throat or runny nose.    CARDIOVASCULAR:  No pressure, squeezing, strangling, tightness, heaviness or aching about the chest, neck, axilla or epigastrium.    RESPIRATORY:  No cough, shortness of breath, PND or orthopnea.    GASTROINTESTINAL:  No nausea, vomiting or diarrhea.    GENITOURINARY:  No dysuria, frequency or urgency.    MUSCULOSKELETAL:  As per HPI.    SKIN:  No change in skin, hair or nails.    NEUROLOGIC:  No paresthesias, fasciculations, seizures or weakness.                  PHYSICAL EXAMINATION:    GENERAL: The patient is a _____in no apparent distress. ___     VITAL SIGNS: T(C): 36.5 (06-02-24 @ 10:51), Max: 36.7 (06-01-24 @ 19:51)  HR: 69 (06-02-24 @ 10:51) (66 - 74)  BP: 146/70 (06-02-24 @ 10:51) (112/68 - 156/78)  RR: 18 (06-02-24 @ 10:51) (18 - 18)  SpO2: 96% (06-02-24 @ 10:51) (96% - 98%)  Wt(kg): --    HEENT: Head is normocephalic and atraumatic.  ANICTERIC  NECK: Supple. No carotid bruits.  No lymphadenopathy or thyromegaly.    LUNGS:COARSE BREATH SOUNDS    HEART: Regular rate and rhythm without murmur.    ABDOMEN: Soft, nontender, and nondistended.  Positive bowel sounds.  No hepatosplenomegaly was noted. NO REBOUND NO GUARDING    EXTREMITIES: NO EDEMA NO ERYTHEMA    NEUROLOGIC: NON FOCAL      SKIN: No ulceration or induration present. NO RASH        BLOOD CULTURES  Culture Results:   No growth at 24 hours (06-01 @ 05:15)  Culture Results:   No growth at 24 hours (06-01 @ 05:02)  Culture Results:   <10,000 CFU/mL Normal Urogenital Daly (05-29 @ 23:30)  Culture Results:   Growth in anaerobic bottle: Enterococcus casseliflavus  Growth in aerobic bottle: Escherichia coli Susceptibility to follow.  Direct identification is available within approximately 3-5  hours either by Blood Panel Multiplexed PCR or Direct  MALDI-TOF. Details: [email or web address] (05-29 @ 23:15)  Culture Results:   No growth at 72 Hours (05-29 @ 23:10)       URINE CX          LABS:                        9.7    7.02  )-----------( 201      ( 02 Jun 2024 05:43 )             28.6     06-02    139  |  102  |  22.4<H>  ----------------------------<  211<H>  3.7   |  24.0  |  1.29    Ca    8.6      02 Jun 2024 05:43  Phos  2.9     06-02  Mg     1.9     06-02    TPro  5.8<L>  /  Alb  3.1<L>  /  TBili  0.9  /  DBili  0.5<H>  /  AST  43<H>  /  ALT  145<H>  /  AlkPhos  151<H>  06-02      Urinalysis Basic - ( 02 Jun 2024 05:43 )    Color: x / Appearance: x / SG: x / pH: x  Gluc: 211 mg/dL / Ketone: x  / Bili: x / Urobili: x   Blood: x / Protein: x / Nitrite: x   Leuk Esterase: x / RBC: x / WBC x   Sq Epi: x / Non Sq Epi: x / Bacteria: x        RADIOLOGY & ADDITIONAL STUDIES:      ASSESSMENT/PLAN  74yo female with hx of HTN, HLD, T2DM, chronic lower back pain, multiple episodes of pancreatitis, GERD who presents to the ED with abdominal pain associated with nausea and NBNB emesis for last 2 days. States on Tuesday evening ate dinner and about 2 hours later was having "gas pain" in the epigastric area. She put on a heating pad with gradual resolution of her pain. She went to bed and went to work next day without recurrence of pain. Ate lunch in the afternoon and again had gas pain 2 hr after eating however pain never improved. She placed a heating pad on when she got home but still without relief. 30 minutes later had nausea and vomiting thus decided to call an ambulance and come to the hospital. She denies fever, cough, sore throat, chest pain, SOB, palpitations, diarrhea, constipation, dysuria, LE swelling or pain. (  AS ABOVE ADMITTED WITH ABDOMINAL PAIN  UNDERWENT ERCP FOR CHOLEDOCHOLITHIASIS  BLOOD CX ENTEROCOCCUS AND ECOLI  OVERALL IMPROVED  ON IV ABX  PT WITH REPORTED ALLERGY TO PCN KEFLEX SHE DESCRIBES IT AS HEADACHE NO RASH AND RECEIVED SEVERAL DOSES OF ZOSYN WITHOUT A RASH  ON MERREM CAN CONTINUE FOR NOW  REPEAT CX ARE PENIDNG  MAY BE ABLE TO D/C ON ORAL ABX   IF CX REMAIN NEGATIVE   WILL FOLLOWUP WITH FURTHER RECOMMENDATIONS  SPOKE TO  ON PHONE  SPOKE TO HOSPITALIST                 AVRIL JAMES MD

## 2024-06-03 ENCOUNTER — TRANSCRIPTION ENCOUNTER (OUTPATIENT)
Age: 74
End: 2024-06-03

## 2024-06-03 LAB
-  AMPICILLIN/SULBACTAM: SIGNIFICANT CHANGE UP
-  AMPICILLIN: SIGNIFICANT CHANGE UP
-  AZTREONAM: SIGNIFICANT CHANGE UP
-  CEFAZOLIN: SIGNIFICANT CHANGE UP
-  CEFEPIME: SIGNIFICANT CHANGE UP
-  CEFOXITIN: SIGNIFICANT CHANGE UP
-  CEFTRIAXONE: SIGNIFICANT CHANGE UP
-  CIPROFLOXACIN: SIGNIFICANT CHANGE UP
-  CIPROFLOXACIN: SIGNIFICANT CHANGE UP
-  ERTAPENEM: SIGNIFICANT CHANGE UP
-  GENTAMICIN: SIGNIFICANT CHANGE UP
-  IMIPENEM: SIGNIFICANT CHANGE UP
-  LEVOFLOXACIN: SIGNIFICANT CHANGE UP
-  MEROPENEM: SIGNIFICANT CHANGE UP
-  PIPERACILLIN/TAZOBACTAM: SIGNIFICANT CHANGE UP
-  TOBRAMYCIN: SIGNIFICANT CHANGE UP
-  TRIMETHOPRIM/SULFAMETHOXAZOLE: SIGNIFICANT CHANGE UP
ANION GAP SERPL CALC-SCNC: 11 MMOL/L — SIGNIFICANT CHANGE UP (ref 5–17)
BUN SERPL-MCNC: 22.9 MG/DL — HIGH (ref 8–20)
CALCIUM SERPL-MCNC: 9.2 MG/DL — SIGNIFICANT CHANGE UP (ref 8.4–10.5)
CHLORIDE SERPL-SCNC: 92 MMOL/L — LOW (ref 96–108)
CO2 SERPL-SCNC: 35 MMOL/L — HIGH (ref 22–29)
CREAT SERPL-MCNC: 0.59 MG/DL — SIGNIFICANT CHANGE UP (ref 0.5–1.3)
CULTURE RESULTS: ABNORMAL
EGFR: 95 ML/MIN/1.73M2 — SIGNIFICANT CHANGE UP
GLUCOSE BLDC GLUCOMTR-MCNC: 166 MG/DL — HIGH (ref 70–99)
GLUCOSE BLDC GLUCOMTR-MCNC: 247 MG/DL — HIGH (ref 70–99)
GLUCOSE BLDC GLUCOMTR-MCNC: 287 MG/DL — HIGH (ref 70–99)
GLUCOSE SERPL-MCNC: 107 MG/DL — HIGH (ref 70–99)
MAGNESIUM SERPL-MCNC: 1.8 MG/DL — SIGNIFICANT CHANGE UP (ref 1.6–2.6)
METHOD TYPE: SIGNIFICANT CHANGE UP
ORGANISM # SPEC MICROSCOPIC CNT: ABNORMAL
ORGANISM # SPEC MICROSCOPIC CNT: SIGNIFICANT CHANGE UP
POTASSIUM SERPL-MCNC: 3.7 MMOL/L — SIGNIFICANT CHANGE UP (ref 3.5–5.3)
POTASSIUM SERPL-SCNC: 3.7 MMOL/L — SIGNIFICANT CHANGE UP (ref 3.5–5.3)
SODIUM SERPL-SCNC: 138 MMOL/L — SIGNIFICANT CHANGE UP (ref 135–145)
SPECIMEN SOURCE: SIGNIFICANT CHANGE UP

## 2024-06-03 PROCEDURE — 81001 URINALYSIS AUTO W/SCOPE: CPT

## 2024-06-03 PROCEDURE — 82009 KETONE BODYS QUAL: CPT

## 2024-06-03 PROCEDURE — 96374 THER/PROPH/DIAG INJ IV PUSH: CPT

## 2024-06-03 PROCEDURE — 87077 CULTURE AEROBIC IDENTIFY: CPT

## 2024-06-03 PROCEDURE — 87186 SC STD MICRODIL/AGAR DIL: CPT

## 2024-06-03 PROCEDURE — 87150 DNA/RNA AMPLIFIED PROBE: CPT

## 2024-06-03 PROCEDURE — 82962 GLUCOSE BLOOD TEST: CPT

## 2024-06-03 PROCEDURE — 87086 URINE CULTURE/COLONY COUNT: CPT

## 2024-06-03 PROCEDURE — 99239 HOSP IP/OBS DSCHRG MGMT >30: CPT

## 2024-06-03 PROCEDURE — 87637 SARSCOV2&INF A&B&RSV AMP PRB: CPT

## 2024-06-03 PROCEDURE — 85027 COMPLETE CBC AUTOMATED: CPT

## 2024-06-03 PROCEDURE — 99285 EMERGENCY DEPT VISIT HI MDM: CPT

## 2024-06-03 PROCEDURE — C1769: CPT

## 2024-06-03 PROCEDURE — 74177 CT ABD & PELVIS W/CONTRAST: CPT | Mod: MC

## 2024-06-03 PROCEDURE — 36415 COLL VENOUS BLD VENIPUNCTURE: CPT

## 2024-06-03 PROCEDURE — 85730 THROMBOPLASTIN TIME PARTIAL: CPT

## 2024-06-03 PROCEDURE — 83605 ASSAY OF LACTIC ACID: CPT

## 2024-06-03 PROCEDURE — 84100 ASSAY OF PHOSPHORUS: CPT

## 2024-06-03 PROCEDURE — 83690 ASSAY OF LIPASE: CPT

## 2024-06-03 PROCEDURE — 80076 HEPATIC FUNCTION PANEL: CPT

## 2024-06-03 PROCEDURE — 83735 ASSAY OF MAGNESIUM: CPT

## 2024-06-03 PROCEDURE — 96375 TX/PRO/DX INJ NEW DRUG ADDON: CPT

## 2024-06-03 PROCEDURE — 99232 SBSQ HOSP IP/OBS MODERATE 35: CPT

## 2024-06-03 PROCEDURE — 80048 BASIC METABOLIC PNL TOTAL CA: CPT

## 2024-06-03 PROCEDURE — 74329 X-RAY FOR PANCREAS ENDOSCOPY: CPT

## 2024-06-03 PROCEDURE — 85025 COMPLETE CBC W/AUTO DIFF WBC: CPT

## 2024-06-03 PROCEDURE — 71045 X-RAY EXAM CHEST 1 VIEW: CPT

## 2024-06-03 PROCEDURE — 85610 PROTHROMBIN TIME: CPT

## 2024-06-03 PROCEDURE — 87040 BLOOD CULTURE FOR BACTERIA: CPT

## 2024-06-03 PROCEDURE — 93005 ELECTROCARDIOGRAM TRACING: CPT

## 2024-06-03 PROCEDURE — 86850 RBC ANTIBODY SCREEN: CPT

## 2024-06-03 PROCEDURE — 86900 BLOOD TYPING SEROLOGIC ABO: CPT

## 2024-06-03 PROCEDURE — 71260 CT THORAX DX C+: CPT | Mod: MC

## 2024-06-03 PROCEDURE — 86901 BLOOD TYPING SEROLOGIC RH(D): CPT

## 2024-06-03 PROCEDURE — 83036 HEMOGLOBIN GLYCOSYLATED A1C: CPT

## 2024-06-03 PROCEDURE — 80053 COMPREHEN METABOLIC PANEL: CPT

## 2024-06-03 RX ORDER — CIPROFLOXACIN LACTATE 400MG/40ML
1 VIAL (ML) INTRAVENOUS
Qty: 28 | Refills: 0
Start: 2024-06-03 | End: 2024-06-16

## 2024-06-03 RX ORDER — RAMIPRIL 5 MG
1 CAPSULE ORAL
Refills: 0 | DISCHARGE

## 2024-06-03 RX ORDER — URSODIOL 250 MG/1
1 TABLET, FILM COATED ORAL
Qty: 60 | Refills: 0
Start: 2024-06-03 | End: 2024-07-02

## 2024-06-03 RX ORDER — LIPASE/PROTEASE/AMYLASE 16-48-48K
1 CAPSULE,DELAYED RELEASE (ENTERIC COATED) ORAL
Qty: 90 | Refills: 0
Start: 2024-06-03 | End: 2024-07-02

## 2024-06-03 RX ADMIN — Medication 1: at 08:20

## 2024-06-03 RX ADMIN — Medication 1 CAPSULE(S): at 11:58

## 2024-06-03 RX ADMIN — Medication 50 MILLIGRAM(S): at 05:49

## 2024-06-03 RX ADMIN — OXYCODONE HYDROCHLORIDE 5 MILLIGRAM(S): 5 TABLET ORAL at 17:26

## 2024-06-03 RX ADMIN — Medication 3: at 17:24

## 2024-06-03 RX ADMIN — Medication 1 CAPSULE(S): at 17:25

## 2024-06-03 RX ADMIN — PANTOPRAZOLE SODIUM 40 MILLIGRAM(S): 20 TABLET, DELAYED RELEASE ORAL at 05:49

## 2024-06-03 RX ADMIN — Medication 250 MILLIGRAM(S): at 17:25

## 2024-06-03 RX ADMIN — OXYCODONE HYDROCHLORIDE 5 MILLIGRAM(S): 5 TABLET ORAL at 08:30

## 2024-06-03 RX ADMIN — Medication 250 MILLIGRAM(S): at 05:49

## 2024-06-03 RX ADMIN — OXYCODONE HYDROCHLORIDE 5 MILLIGRAM(S): 5 TABLET ORAL at 09:00

## 2024-06-03 RX ADMIN — MEROPENEM 1000 MILLIGRAM(S): 1 INJECTION INTRAVENOUS at 17:25

## 2024-06-03 RX ADMIN — INSULIN GLARGINE 5 UNIT(S): 100 INJECTION, SOLUTION SUBCUTANEOUS at 08:35

## 2024-06-03 RX ADMIN — URSODIOL 300 MILLIGRAM(S): 250 TABLET, FILM COATED ORAL at 05:48

## 2024-06-03 RX ADMIN — Medication 2: at 11:58

## 2024-06-03 RX ADMIN — Medication 1 CAPSULE(S): at 08:22

## 2024-06-03 RX ADMIN — MEROPENEM 1000 MILLIGRAM(S): 1 INJECTION INTRAVENOUS at 05:48

## 2024-06-03 RX ADMIN — URSODIOL 300 MILLIGRAM(S): 250 TABLET, FILM COATED ORAL at 17:25

## 2024-06-03 RX ADMIN — OXYCODONE HYDROCHLORIDE 5 MILLIGRAM(S): 5 TABLET ORAL at 02:38

## 2024-06-03 NOTE — DISCHARGE NOTE PROVIDER - PROVIDER TOKENS
PROVIDER:[TOKEN:[[id number]],FOLLOWUP:[2 weeks]] PROVIDER:[TOKEN:[[id number]],FOLLOWUP:[2 weeks]],PROVIDER:[TOKEN:[[id number]],FOLLOWUP:[1 week]]

## 2024-06-03 NOTE — DISCHARGE NOTE PROVIDER - NSDCMRMEDTOKEN_GEN_ALL_CORE_FT
ALPRAZolam 0.25 mg oral tablet: 1 tab(s) orally once a day (at bedtime), As Needed  brimonidine 0.2% ophthalmic solution: 1 drop(s) to each affected eye 2 times a day  Florastor 250 mg oral capsule: 1 cap(s) orally 2 times a day   latanoprost 0.005% ophthalmic solution: 1 drop(s) to each affected eye once a day (in the evening)  Levemir 100 units/mL subcutaneous solution: 10 unit(s) subcutaneous once a day  Lipitor 10 mg oral tablet: 1 tab(s) orally once a day  metFORMIN 500 mg oral tablet: 1 tab(s) orally once a day  oxyCODONE 5 mg oral tablet: 1 tab(s) orally 4 times a day, As Needed  Protonix 40 mg oral delayed release tablet: 1 tab(s) orally 2 times a day   ramipril 5 mg oral capsule: 1 cap(s) orally once a day  Restasis 0.05% ophthalmic emulsion: 1 drop(s) to each affected eye every 12 hours  Toprol-XL 50 mg oral tablet, extended release: 1 tab(s) orally once a day   ALPRAZolam 0.25 mg oral tablet: 1 tab(s) orally once a day (at bedtime), As Needed  brimonidine 0.2% ophthalmic solution: 1 drop(s) to each affected eye 2 times a day  ciprofloxacin 500 mg oral tablet: 1 tab(s) orally 2 times a day  Florastor 250 mg oral capsule: 1 cap(s) orally 2 times a day   latanoprost 0.005% ophthalmic solution: 1 drop(s) to each affected eye once a day (in the evening)  Levemir 100 units/mL subcutaneous solution: 7 unit(s) subcutaneous once a day 7 units in the morning and 5 units at night  Lipitor 10 mg oral tablet: 1 tab(s) orally once a day  metFORMIN 500 mg oral tablet: 1 tab(s) orally once a day  oxyCODONE 5 mg oral tablet: 1 tab(s) orally 4 times a day, As Needed  pancrelipase 6000 units-19,000 units-30,000 units oral delayed release capsule: 1 cap(s) orally 3 times a day (with meals)  Protonix 40 mg oral delayed release tablet: 1 tab(s) orally 2 times a day   Restasis 0.05% ophthalmic emulsion: 1 drop(s) to each affected eye every 12 hours  Toprol-XL 50 mg oral tablet, extended release: 1 tab(s) orally once a day  ursodiol 300 mg oral capsule: 1 cap(s) orally every 12 hours

## 2024-06-03 NOTE — DISCHARGE NOTE PROVIDER - ATTENDING DISCHARGE PHYSICAL EXAMINATION:
GENERAL: alert, not in distress   CHEST/LUNG: b/l air entry  HEART: reg  ABDOMEN: soft, bs+, non tender  EXTREMITIES:  no edema, tenderness

## 2024-06-03 NOTE — DISCHARGE NOTE PROVIDER - NPI NUMBER (FOR SYS[employer] USE ONLY) :
[[id number]] [[id number]],[[id number]] ineffective breastfeeding/sore breast/s/engorgement/knowledge deficit/plugged ducts

## 2024-06-03 NOTE — DISCHARGE NOTE NURSING/CASE MANAGEMENT/SOCIAL WORK - NSDCPEFALRISK_GEN_ALL_CORE
For information on Fall & Injury Prevention, visit: [email or web address] OR  [email or web address] OR  https://www.cdc.gov/steadi/patient.html

## 2024-06-03 NOTE — DISCHARGE NOTE PROVIDER - CARE PROVIDER_API CALL
Dennys Miller  Gastroenterology  [address]  [place] [address]  Phone: [phone]  Fax: [phone]  Follow Up Time: 2 weeks   Dennys Miller  Gastroenterology  [address]  [place] [address]  Phone: [phone]  Fax: [phone]  Follow Up Time: 2 weeks    RENETTA ZUNIGA, RODRIGUEZ GONZALEZ  [address] [place] [address]  Phone: ([phone]  Fax: ()-  Follow Up Time: 1 week

## 2024-06-03 NOTE — PROGRESS NOTE ADULT - ASSESSMENT
74yo female with hx of HTN, HLD, T2DM, chronic lower back pain, multiple episodes of pancreatitis, GERD who presents to the ED with abdominal pain associated with nausea and NBNB emesis for last 2 days. States on Tuesday evening ate dinner and about 2 hours later was having "gas pain" in the epigastric area. She put on a heating pad with gradual resolution of her pain. She went to bed and went to work next day without recurrence of pain. Ate lunch in the afternoon and again had gas pain 2 hr after eating however pain never improved. She placed a heating pad on when she got home but still without relief. 30 minutes later had nausea and vomiting thus decided to call an ambulance and come to the hospital. She denies fever, cough, sore throat, chest pain, SOB, palpitations, diarrhea, constipation, dysuria, LE swelling or pain. (  AS ABOVE ADMITTED WITH ABDOMINAL PAIN  UNDERWENT ERCP FOR CHOLEDOCHOLITHIASIS  BLOOD CX ENTEROCOCCUS AND ECOLI  OVERALL IMPROVED  ON IV ABX  PT WITH REPORTED ALLERGY TO PCN KEFLEX SHE DESCRIBES IT AS HEADACHE NO RASH AND RECEIVED SEVERAL DOSES OF ZOSYN WITHOUT A RASH  ON MERREM CAN CONTINUE FOR NOW  REPEAT CX ARE NEGATIVE    D/C ON ORAL ABX   CAN RX CIPRO 500 BID FOR 2 WEEKS  FOLLOWP WITH DR OROZCO   SPOKE TO    SPOKE TO HOSPITALIST

## 2024-06-03 NOTE — DISCHARGE NOTE NURSING/CASE MANAGEMENT/SOCIAL WORK - PATIENT PORTAL LINK FT
You can access the FollowMyHealth Patient Portal offered by [facility] by registering at the following website: [email or web address]. By joining [email or web address]’s FollowMyHealth portal, you will also be able to view your health information using other applications (apps) compatible with our system.

## 2024-06-03 NOTE — DISCHARGE NOTE PROVIDER - HOSPITAL COURSE
73 yr old female with hypertension, hyperlipidemia, diabetes mellitus, chronic lower back pain, prior pancreatitis requiring ERCP and biliary stent placement, which was subsequently removed, GERD presented with complaints of nausea, abdominal pain and fever on admission. Noted to have elevated WBC, LFTs and CT abdomen with intra hepatic and CBD dilatation. ID and GI were consulted, empiric Zosyn was initiated and ERCP was scheduled. Cultures sent on admission. She underwent ERCP on 5/31, biliary stones were removed. Post procedure, her diet was advanced. Pt currently with sepsis 2/2 cholangitis and E coli bacteremia.  Pt was transitioned to merrum and ID is recommending ________ on discharge. Pt has since improved. Pt also with TIMOTHY 2/2 ATN during this hospitalization which improved with IVF. Pt is now medically stable for discharge with appropriate outpatient follow up.    All electrolyte abnormalities were monitored carefully and repleted as necessary during this hospitalization. At the time of discharge patient was hemodynamically stable and amenable to all terms of discharge.      73 yr old female with hypertension, hyperlipidemia, diabetes mellitus, chronic lower back pain, prior pancreatitis requiring ERCP and biliary stent placement, which was subsequently removed, GERD presented with complaints of nausea, abdominal pain and fever on admission. Noted to have elevated WBC, LFTs and CT abdomen with intra hepatic and CBD dilatation. ID and GI were consulted, empiric Zosyn was initiated and ERCP was scheduled. Cultures sent on admission. She underwent ERCP on 5/31, biliary stones were removed. Post procedure, her diet was advanced. Pt currently with sepsis 2/2 cholangitis and E coli bacteremia.  Pt was transitioned to merrum and ID is recommending Cipro 500mg BID x 14 days on discharge. Pt has since improved. Pt also with TIMOTHY 2/2 ATN during this hospitalization which improved with IVF. Pt is now medically stable for discharge with appropriate outpatient follow up.    All electrolyte abnormalities were monitored carefully and repleted as necessary during this hospitalization. At the time of discharge patient was hemodynamically stable and amenable to all terms of discharge.      73 yr old female with hypertension, hyperlipidemia, diabetes mellitus, chronic lower back pain, prior pancreatitis requiring ERCP and biliary stent placement, which was subsequently removed, GERD presented with complaints of nausea, abdominal pain and fever on admission. Noted to have elevated WBC, LFTs and CT abdomen with intra hepatic and CBD dilatation. ID and GI were consulted, empiric Zosyn was initiated and ERCP was scheduled. Cultures sent on admission. She underwent ERCP on 5/31, biliary stones were removed. Post procedure, her diet was advanced. Pt currently with sepsis 2/2 cholangitis and E coli bacteremia.  Pt was transitioned to meropenem and ID is recommending Cipro 500mg BID x 14 days on discharge. Pt has since improved. Pt also with TIMOTHY 2/2 ATN during this hospitalization which improved with IVF. Pt is now medically stable for discharge with appropriate outpatient follow up.    All electrolyte abnormalities were monitored carefully and repleted as necessary during this hospitalization. At the time of discharge patient was hemodynamically stable and amenable to all terms of discharge.

## 2024-06-03 NOTE — DISCHARGE NOTE PROVIDER - NSDCCPCAREPLAN_GEN_ALL_CORE_FT
PRINCIPAL DISCHARGE DIAGNOSIS  Diagnosis: Acute cholangitis  Assessment and Plan of Treatment: - You underwent ERCP and stone removal during this hospitalization   - Continue antibiotics as directed  - Follow up with PCP, GI and ID in 1 week      SECONDARY DISCHARGE DIAGNOSES  Diagnosis: DM (diabetes mellitus)  Assessment and Plan of Treatment: - Continue medications as directed   - Follow up with PCP in 1 week    Diagnosis: TIMOTHY (acute kidney injury)  Assessment and Plan of Treatment: - Resolved  - Follow up with PCP outpatient for repeat BMP in 1 week    Diagnosis: History of pancreatitis  Assessment and Plan of Treatment: - Continue medications as directed  - Follow up with PCP in 1 week     PRINCIPAL DISCHARGE DIAGNOSIS  Diagnosis: Acute cholangitis  Assessment and Plan of Treatment: - You underwent ERCP and stone removal during this hospitalization   - Continue antibiotics as directed - Cipro 500mg BID x 14 days   - Follow up with PCP, GI and ID in 1 week      SECONDARY DISCHARGE DIAGNOSES  Diagnosis: DM (diabetes mellitus)  Assessment and Plan of Treatment: - Continue medications as directed   - Follow up with PCP in 1 week    Diagnosis: TIMOTHY (acute kidney injury)  Assessment and Plan of Treatment: - Resolved  - Follow up with PCP outpatient for repeat BMP in 1 week    Diagnosis: History of pancreatitis  Assessment and Plan of Treatment: - Continue medications as directed  - Follow up with PCP in 1 week     PRINCIPAL DISCHARGE DIAGNOSIS  Diagnosis: Acute cholangitis  Assessment and Plan of Treatment: - You underwent ERCP and stone removal during this hospitalization   - Continue antibiotics as directed - Cipro 500mg BID x 14 days   - Follow up with PCP, GI and ID in 1 week      SECONDARY DISCHARGE DIAGNOSES  Diagnosis: DM (diabetes mellitus)  Assessment and Plan of Treatment: - Continue medications as directed   - Follow up with PCP in 1 week    Diagnosis: TIMOTHY (acute kidney injury)  Assessment and Plan of Treatment: - Resolved  - Follow up with PCP outpatient for repeat BMP in 1 week    Diagnosis: History of pancreatitis  Assessment and Plan of Treatment: - Continue medications as directed  - Follow up with PCP in 1 week    Diagnosis: E coli bacteremia  Assessment and Plan of Treatment: resolved  complete course of Cipro

## 2024-06-03 NOTE — PROGRESS NOTE ADULT - ASSESSMENT
73 yr old female with hypertension, hyperlipidemia, diabetes mellitus, chronic lower back pain, prior pancreatitis requiring ERCP and biliary stent placement, which was subsequently removed, GERD presented with complaints of nausea, abdominal pain and fever on admission. Noted to have elevated WBC, LFTs and CT abdomen with intra hepatic and CBD dilatation. GI was consulted, empiric Zosyn was initiated and ERCP was scheduled. Cultures sent on admission. She underwent ERCP on 5/31, biliary stones were removed. Post procedure, her diet was advanced. Noted to have E coli bacteremia.    1. Sepsis sec cholangitis and E coli and Enterococcus bacteremia:  Sepsis resolving  s/p ERCP and stone removal  monitor LFTs, pain control  Repeat blood cultures negative at 48 hrs.   continue Meropenem  ID evaluation appreciated.     2. TIMOTHY sec ATN:  resolved  stable off fluids.     3. Diabetes mellitus:  Continue Lantus 3 units QHS and Lantus 5 units in am  sliding scale coverage    4. H/o pancreatitis:  On Creon TID w/meals    5. Hyperlipidemia, hypertension:  Continue Ator 10 mg QHS and Toprol 50 mg QD    6. DVT ppx:  Lovenox    Discussed with patient and RN.   anticipate discharge in 24 hrs. 73 yr old female with hypertension, hyperlipidemia, diabetes mellitus, chronic lower back pain, prior pancreatitis requiring ERCP and biliary stent placement, which was subsequently removed, GERD presented with complaints of nausea, abdominal pain and fever on admission. Noted to have elevated WBC, LFTs and CT abdomen with intra hepatic and CBD dilatation. GI was consulted, empiric Zosyn was initiated and ERCP was scheduled. Cultures sent on admission. She underwent ERCP on 5/31, biliary stones were removed. Post procedure, her diet was advanced. Noted to have E coli bacteremia.    1. Sepsis sec cholangitis and E coli and Enterococcus bacteremia:  Sepsis resolving  s/p ERCP and stone removal  monitor LFTs, pain control  Repeat blood cultures negative at 48 hrs.   continue Meropenem  ID evaluation appreciated.     2. TIMOTHY sec ATN:  resolved  stable off fluids.     3. Diabetes mellitus:  Continue Lantus 3 units QHS and Lantus 5 units in am  sliding scale coverage    4. H/o pancreatitis:  On Creon TID w/meals    5. Hyperlipidemia, hypertension:  Continue Ator 10 mg QHS and Toprol 50 mg QD    6. DVT ppx:  Lovenox    Discussed with patient and RN.   updated spouse.

## 2024-06-03 NOTE — DISCHARGE NOTE PROVIDER - NSDCFUADDAPPT_GEN_ALL_CORE_FT
APPTS ARE READY TO BE MADE: [X] YES    Best Family or Patient Contact (if needed):    Additional Information about above appointments (if needed):    1:   2:   3:     Other comments or requests:    APPTS ARE READY TO BE MADE: [X] YES    Best Family or Patient Contact (if needed):    Additional Information about above appointments (if needed):    1:   2:   3:     Other comments or requests:       Patient was provided with referral details by phone or email for a [facility] provider and will coordinate the appointment on their own.

## 2024-06-03 NOTE — DISCHARGE NOTE PROVIDER - CARE PROVIDERS DIRECT ADDRESSES
intake pt coming with RUQ pain rad. to right upper back pain, x few days, denies n/v /dysuria/hematuria pt stated Hx. herniate disc few yrs ago, some mid ABD pain at times, a small umbilicus hernia noted, last BM this AM normal color.   labs done IV to right AC 20g angio cath.   medicated as ordered.      Michelle Yun RN ,[email or web address]

## 2024-06-03 NOTE — PROGRESS NOTE ADULT - SUBJECTIVE AND OBJECTIVE BOX
INTERVAL HPI/OVERNIGHT EVENTS:    CC: cholangitis, E coli bacteremia, choledocholithiasis, TIMOTHY    No overnight events  feels well    Vital Signs Last 24 Hrs  T(C): 36.6 (03 Jun 2024 11:05), Max: 36.9 (02 Jun 2024 20:35)  T(F): 97.9 (03 Jun 2024 11:05), Max: 98.5 (02 Jun 2024 20:35)  HR: 76 (03 Jun 2024 11:05) (72 - 76)  BP: 102/57 (03 Jun 2024 11:05) (102/57 - 172/74)  BP(mean): --  RR: 18 (03 Jun 2024 11:05) (16 - 18)  SpO2: 93% (03 Jun 2024 11:05) (93% - 97%)    Parameters below as of 03 Jun 2024 11:05  Patient On (Oxygen Delivery Method): room air        PHYSICAL EXAM:    GENERAL: alert, not in distress   CHEST/LUNG: b/l air entry  HEART: reg  ABDOMEN: soft, bs+, non tender  EXTREMITIES:  no edema, tenderness    MEDICATIONS  (STANDING):  atorvastatin 10 milliGRAM(s) Oral at bedtime  dextrose 10% Bolus 125 milliLiter(s) IV Bolus once  dextrose 5%. 1000 milliLiter(s) (50 mL/Hr) IV Continuous <Continuous>  dextrose 5%. 1000 milliLiter(s) (100 mL/Hr) IV Continuous <Continuous>  dextrose 50% Injectable 25 Gram(s) IV Push once  dextrose 50% Injectable 12.5 Gram(s) IV Push once  glucagon  Injectable 1 milliGRAM(s) IntraMuscular once  insulin glargine Injectable (LANTUS) 3 Unit(s) SubCutaneous at bedtime  insulin glargine Injectable (LANTUS) 5 Unit(s) SubCutaneous every morning  insulin lispro (ADMELOG) corrective regimen sliding scale   SubCutaneous Before meals and at bedtime  meropenem Injectable 1000 milliGRAM(s) IV Push every 12 hours  metoprolol succinate ER 50 milliGRAM(s) Oral daily  pancrelipase  (CREON  6,000 Lipase Units) 1 Capsule(s) Oral three times a day with meals  pantoprazole    Tablet 40 milliGRAM(s) Oral before breakfast  saccharomyces boulardii 250 milliGRAM(s) Oral two times a day  sodium chloride 0.9%. 1000 milliLiter(s) (100 mL/Hr) IV Continuous <Continuous>  ursodiol Capsule 300 milliGRAM(s) Oral every 12 hours    MEDICATIONS  (PRN):  acetaminophen     Tablet .. 650 milliGRAM(s) Oral every 6 hours PRN Temp greater or equal to 38C (100.4F), Mild Pain (1 - 3)  ALPRAZolam 0.25 milliGRAM(s) Oral at bedtime PRN anxiety/insomnia  aluminum hydroxide/magnesium hydroxide/simethicone Suspension 30 milliLiter(s) Oral every 4 hours PRN Dyspepsia  dextrose Oral Gel 15 Gram(s) Oral once PRN Blood Glucose LESS THAN 70 milliGRAM(s)/deciliter  melatonin 3 milliGRAM(s) Oral at bedtime PRN Insomnia  ondansetron Injectable 4 milliGRAM(s) IV Push every 8 hours PRN Nausea and/or Vomiting  oxyCODONE    IR 5 milliGRAM(s) Oral four times a day PRN Moderate Pain (4 - 6)      Allergies    penicillin (Anaphylaxis)  Keflex (Anaphylaxis)    Intolerances          LABS:                          9.7    7.02  )-----------( 201      ( 02 Jun 2024 05:43 )             28.6     06-03    138  |  92<L>  |  22.9<H>  ----------------------------<  107<H>  3.7   |  35.0<H>  |  0.59    Ca    9.2      03 Jun 2024 06:45  Phos  2.9     06-02  Mg     1.8     06-03    TPro  5.8<L>  /  Alb  3.1<L>  /  TBili  0.9  /  DBili  0.5<H>  /  AST  43<H>  /  ALT  145<H>  /  AlkPhos  151<H>  06-02      Urinalysis Basic - ( 03 Jun 2024 06:45 )    Color: x / Appearance: x / SG: x / pH: x  Gluc: 107 mg/dL / Ketone: x  / Bili: x / Urobili: x   Blood: x / Protein: x / Nitrite: x   Leuk Esterase: x / RBC: x / WBC x   Sq Epi: x / Non Sq Epi: x / Bacteria: x        RADIOLOGY & ADDITIONAL TESTS:  
        Chief Complaint:  Patient is a 73y old  Female who presents with a chief complaint of Choledocholithiasis (31 May 2024 12:23)      HPI/ 24 hr events: Patient seen and examined at bedside. Pt feeling well this morning. Tolerating diet. Denies nausea, vomiting, diarrhea, abdominal pain. Vitals are overall stable. Yesterday ERCP was performed and brown stones were removed from the bile duct. Blood cultures were positive.       REVIEW OF SYSTEMS:   General: Negative  HEENT: Negative  CV: Negative  Respiratory: Negative  GI: See HPI  : Negative  MSK: Negative  Hematologic: Negative  Skin: Negative    MEDICATIONS:   MEDICATIONS  (STANDING):  atorvastatin 10 milliGRAM(s) Oral at bedtime  dextrose 10% Bolus 125 milliLiter(s) IV Bolus once  dextrose 5%. 1000 milliLiter(s) (50 mL/Hr) IV Continuous <Continuous>  dextrose 5%. 1000 milliLiter(s) (100 mL/Hr) IV Continuous <Continuous>  dextrose 50% Injectable 25 Gram(s) IV Push once  dextrose 50% Injectable 12.5 Gram(s) IV Push once  glucagon  Injectable 1 milliGRAM(s) IntraMuscular once  insulin glargine Injectable (LANTUS) 3 Unit(s) SubCutaneous every morning  insulin glargine Injectable (LANTUS) 3 Unit(s) SubCutaneous at bedtime  insulin lispro (ADMELOG) corrective regimen sliding scale   SubCutaneous Before meals and at bedtime  metoprolol succinate ER 50 milliGRAM(s) Oral daily  pancrelipase  (CREON  6,000 Lipase Units) 1 Capsule(s) Oral three times a day with meals  pantoprazole    Tablet 40 milliGRAM(s) Oral before breakfast  piperacillin/tazobactam IVPB.. 3.375 Gram(s) IV Intermittent every 8 hours  saccharomyces boulardii 250 milliGRAM(s) Oral two times a day    MEDICATIONS  (PRN):  acetaminophen     Tablet .. 650 milliGRAM(s) Oral every 6 hours PRN Temp greater or equal to 38C (100.4F), Mild Pain (1 - 3)  ALPRAZolam 0.25 milliGRAM(s) Oral at bedtime PRN anxiety/insomnia  aluminum hydroxide/magnesium hydroxide/simethicone Suspension 30 milliLiter(s) Oral every 4 hours PRN Dyspepsia  dextrose Oral Gel 15 Gram(s) Oral once PRN Blood Glucose LESS THAN 70 milliGRAM(s)/deciliter  melatonin 3 milliGRAM(s) Oral at bedtime PRN Insomnia  ondansetron Injectable 4 milliGRAM(s) IV Push every 8 hours PRN Nausea and/or Vomiting  oxyCODONE    IR 5 milliGRAM(s) Oral four times a day PRN Moderate Pain (4 - 6)            DIET:  Diet, Regular (05-31-24 @ 18:17) [Active]  Diet, Consistent Carbohydrate/No Snacks (05-30-24 @ 08:04) [Available for Activation]  Diet, Consistent Carbohydrate Full Liquid (05-30-24 @ 08:04) [Available for Activation]          ALLERGIES:   Allergies    penicillin (Anaphylaxis)  Keflex (Anaphylaxis)    Intolerances        VITAL SIGNS:   Vital Signs Last 24 Hrs  T(C): 37.1 (01 Jun 2024 10:06), Max: 37.1 (01 Jun 2024 10:06)  T(F): 98.7 (01 Jun 2024 10:06), Max: 98.7 (01 Jun 2024 10:06)  HR: 80 (01 Jun 2024 10:06) (69 - 80)  BP: 161/71 (01 Jun 2024 10:06) (128/70 - 161/71)  BP(mean): --  RR: 18 (01 Jun 2024 10:06) (17 - 18)  SpO2: 95% (01 Jun 2024 10:06) (94% - 98%)    Parameters below as of 01 Jun 2024 10:06  Patient On (Oxygen Delivery Method): room air      I&O's Summary      PHYSICAL EXAM:   GENERAL:  No acute distress  HEENT:  NC/AT, conjunctiva clear, sclera anicteric  CHEST:  No increased effort  HEART:  Regular rate  ABDOMEN:  Soft, non-tender, non-distended, no rebound or guarding  EXTREMITIES: No edema  SKIN:  Warm, dry  NEURO:  Calm, cooperative    LABS:                        11.2   11.56 )-----------( 220      ( 01 Jun 2024 05:02 )             32.8     Hemoglobin: 11.2 g/dL (06-01-24 @ 05:02)  Hemoglobin: 9.5 g/dL (05-31-24 @ 04:40)  Hemoglobin: 11.4 g/dL (05-29-24 @ 23:10)    06-01    137  |  97  |  23.6<H>  ----------------------------<  202<H>  4.7   |  24.0  |  1.57<H>    Ca    9.7      01 Jun 2024 05:02    TPro  6.7  /  Alb  3.5  /  TBili  3.2<H>  /  DBili  2.2<H>  /  AST  124<H>  /  ALT  253<H>  /  AlkPhos  208<H>  06-01    LIVER FUNCTIONS - ( 01 Jun 2024 05:02 )  Alb: 3.5 g/dL / Pro: 6.7 g/dL / ALK PHOS: 208 U/L / ALT: 253 U/L / AST: 124 U/L / GGT: x             PT/INR - ( 31 May 2024 04:40 )   PT: 11.4 sec;   INR: 1.03 ratio             Culture - Urine (collected 29 May 2024 23:30)  Source: Clean Catch Clean Catch (Midstream)  Final Report (31 May 2024 06:24):    <10,000 CFU/mL Normal Urogenital Daly    Culture - Blood (collected 29 May 2024 23:15)  Source: .Blood Blood-Peripheral  Gram Stain (01 Jun 2024 02:43):    Growth in anaerobic bottle: Gram positive cocci in pairs    Growth in aerobic bottle: Gram Negative Rods  Preliminary Report (01 Jun 2024 12:05):    Growth in anaerobic bottle: Enterococcus casseliflavus    Growth in aerobic bottle: Gram Negative Rods    Direct identification is available within approximately 3-5    hours either by Blood Panel Multiplexed PCR or Direct    MALDI-TOF. Details: [email or web address]  Organism: Blood Culture PCR  Blood Culture PCR (01 Jun 2024 04:20)  Organism: Blood Culture PCR (01 Jun 2024 04:20)  Organism: Blood Culture PCR (31 May 2024 19:47)    Culture - Blood (collected 29 May 2024 23:10)  Source: .Blood Blood-Peripheral  Preliminary Report (01 Jun 2024 03:02):    No growth at 48 Hours                                          RADIOLOGY & ADDITIONAL STUDIES:      ACC: [id number] EXAM:  CT ABDOMEN AND PELVIS IC   ORDERED BY: ADAIR GRULLON     ACC: [id number] EXAM:  CT CHEST IC   ORDERED BY: ADAIR GRULLON     PROCEDURE DATE:  05/30/2024          INTERPRETATION:  CLINICAL INFORMATION: Sepsis.    COMPARISON: 1/19/2022 abdominal MRI, 1/16/2022 abdominal CT    CONTRAST/COMPLICATIONS:  IV Contrast: Omnipaque 350  90 cc administered   10 cc discarded  Oral Contrast: NONE  Complications: None reported at time of study completion    PROCEDURE:  CT of the Chest, Abdomen and Pelvis was performed.  Sagittal and coronal reformats were performed.    FINDINGS:  CHEST:  LUNGS AND LARGE AIRWAYS: Patent central airways. No pulmonary   consolidation. Stable appearance of left lower lobe clustered punctate   nodularity with multiple small calcifications likely reflecting chronic   small airways disease (3, 73).  PLEURA: No pleural effusion.  VESSELS: Atherosclerotic changes of the aorta and coronary arteries.  HEART: Heart size is normal. No pericardial effusion. Mitral valve   annular calcification.  MEDIASTINUM AND SIOMARA: No lymphadenopathy.  CHEST WALL AND LOWER NECK:  Small hypodense thyroid nodules.    ABDOMEN AND PELVIS:  LIVER: Within normal limits.  BILE DUCTS: Redemonstrated intrahepatic and CBD dilatation, with CBD   measuring up to 1.3 cm. New 9 mm focal rounded density within the distal   CBD (3, 120 and 5, 45), most compatible with stone.  GALLBLADDER: Cholecystectomy.  SPLEEN: Within normal limits.  PANCREAS: Pancreatic body calcification again noted and likely related to   sequela of chronic pancreatitis. Focus of air within the pancreatic duct   (2, 112), presumably related to prior instrumentation.  ADRENALS: Within normal limits.  KIDNEYS/URETERS: No hydronephrosis or obstructing stone. Symmetric renal   enhancement. Nonspecific mild bilateral perinephric stranding.    BLADDER: Diffuse bladder wall thickening though under distended.  REPRODUCTIVE ORGANS: Uterus and right adnexa unremarkable. 1.4 segment   left ovarian cyst.    BOWEL: No bowel obstruction. Appendix is not visualized. No evidence of   inflammation in the pericecal region. Prominent rectal stool burden. Mild   wall thickening of the transverse, descending and sigmoid colon. Mild   diffuse gastric wall thickening. Duodenal diverticulum. Mild colonic   diverticulosis.  PERITONEUM: No ascites.  VESSELS: Atherosclerotic changes.  RETROPERITONEUM/LYMPH NODES: No lymphadenopathy.  ABDOMINAL WALL: Mild nonspecific subcutaneous infiltration in the left   ventral abdominal wall.  BONES: Degenerative changes.    IMPRESSION:    1. New stone within the distal CBD. Intrahepatic and CBD dilatation   similar to prior exam.    2. Diffuse bladder wall thickening though under distended. Nonspecific   mild bilateral perinephric stranding. Recommend correlation with   urinalysis.    3. Mild wall thickening of the transverse through sigmoid colon may   reflect underdistention or colitis. Clinical correlation recommended.    4. Mild diffuse gastric wall thickening could reflect underdistention or   gastritis. Recommend clinical correlation and follow-up endoscopy as   warranted.        --- End of Report ---            NEGRITA ARRIAGA MD; Attending Radiologist  This document has been electronically signed. May 30 2024  3:44AM  05-30-24 @ 03:35              ERCP Report    Date: 5/31/2024 10:00 AM          ERCP Findings:     radiograph was taken. Limited views of the esophagus, stomach and duodenum    were unremarkable. The major papilla was noted in the second portion of duodenum    and appeared normal. The major papilla was cannulated, using wire-guided    cannulation, using an Olympus Clevercut sphincterotome preloaded with a    0.035inch wire, the common bile duct was cannulated, the wire was seen in the    CBD, cholangiogram was performed confirming location. Previous sphincterotomy    was noted. Cholangiogram concerning for a filling defect in the CBD. The CBD was    dilated but otherwise unremarkable. Brown stones were removed from the bile duct    using a 11.5 mm biliary stone extraction balloon. The duct was then swept    multiple times with 8.5 mm and 11.5 mm balloons and was irrigated with 40 ml of    sterile water. An occlusion cholangiogram was then performed opacifying the bile    duct and patent cystic duct. The pancreatic duct was not injected in this exam.    The scope was withdrawn and procedure terminated. Post-procedure xray did not    show air under the diaphragm        Impressions:    Choledocholithiasis.        Summary:    Stone/sludge removal        Plan:    Return to floor for further management    Advance diet as tolerated. Discharge to home if she contiues to feel better. ABX    for 7 days    Dennys Miller MD    Version 1, Electronically signed on 5/31/2024 10:48:05 AM by Dennys Miller MD  
INFECTIOUS DISEASES AND INTERNAL MEDICINE at [place]  =======================================================  Fernando Terrell MD  Diplomates American Board of Internal Medicine and Infectious Diseases  Telephone [phone]  Fax            [phone]  =======================================================    JAISON ERIK [id number]    Follow up: BACTEREMIA  BILIARY STONE    Allergies:  penicillin (Anaphylaxis)  Keflex (Anaphylaxis)      Medications:  acetaminophen     Tablet .. 650 milliGRAM(s) Oral every 6 hours PRN  ALPRAZolam 0.25 milliGRAM(s) Oral at bedtime PRN  aluminum hydroxide/magnesium hydroxide/simethicone Suspension 30 milliLiter(s) Oral every 4 hours PRN  atorvastatin 10 milliGRAM(s) Oral at bedtime  dextrose 10% Bolus 125 milliLiter(s) IV Bolus once  dextrose 5%. 1000 milliLiter(s) IV Continuous <Continuous>  dextrose 5%. 1000 milliLiter(s) IV Continuous <Continuous>  dextrose 50% Injectable 25 Gram(s) IV Push once  dextrose 50% Injectable 12.5 Gram(s) IV Push once  dextrose Oral Gel 15 Gram(s) Oral once PRN  glucagon  Injectable 1 milliGRAM(s) IntraMuscular once  insulin glargine Injectable (LANTUS) 3 Unit(s) SubCutaneous at bedtime  insulin glargine Injectable (LANTUS) 5 Unit(s) SubCutaneous every morning  insulin lispro (ADMELOG) corrective regimen sliding scale   SubCutaneous Before meals and at bedtime  melatonin 3 milliGRAM(s) Oral at bedtime PRN  meropenem Injectable 1000 milliGRAM(s) IV Push every 12 hours  metoprolol succinate ER 50 milliGRAM(s) Oral daily  ondansetron Injectable 4 milliGRAM(s) IV Push every 8 hours PRN  oxyCODONE    IR 5 milliGRAM(s) Oral four times a day PRN  pancrelipase  (CREON  6,000 Lipase Units) 1 Capsule(s) Oral three times a day with meals  pantoprazole    Tablet 40 milliGRAM(s) Oral before breakfast  saccharomyces boulardii 250 milliGRAM(s) Oral two times a day  sodium chloride 0.9%. 1000 milliLiter(s) IV Continuous <Continuous>  ursodiol Capsule 300 milliGRAM(s) Oral every 12 hours    SOCIAL       FAMILY   FAMILY HISTORY:  Family history of CHF (congestive heart failure)      REVIEW OF SYSTEMS:  CONSTITUTIONAL:  No Fever or chills  HEENT:   No diplopia or blurred vision.  No earache, sore throat or runny nose.  CARDIOVASCULAR:  No pressure, squeezing, strangling, tightness, heaviness or aching about the chest, neck, axilla or epigastrium.  RESPIRATORY:  No cough, shortness of breath, PND or orthopnea.  GASTROINTESTINAL:  No nausea, vomiting or diarrhea.  GENITOURINARY:  No dysuria, frequency or urgency. No Blood in urine  MUSCULOSKELETAL:   moves all joints  SKIN:  No change in skin, hair or nails.  NEUROLOGIC:  No paresthesias, fasciculations, seizures or weakness.  PSYCHIATRIC:  No disorder of thought or mood.  ENDOCRINE:  No heat or cold intolerance, polyuria or polydipsia.  HEMATOLOGICAL:  No easy bruising or bleeding.            Physical Exam:  ICU Vital Signs Last 24 Hrs  T(C): 36.6 (03 Jun 2024 11:05), Max: 36.9 (02 Jun 2024 20:35)  T(F): 97.9 (03 Jun 2024 11:05), Max: 98.5 (02 Jun 2024 20:35)  HR: 76 (03 Jun 2024 11:05) (72 - 76)  BP: 102/57 (03 Jun 2024 11:05) (102/57 - 172/74)  BP(mean): --  ABP: --  ABP(mean): --  RR: 18 (03 Jun 2024 11:05) (16 - 18)  SpO2: 93% (03 Jun 2024 11:05) (93% - 97%)    O2 Parameters below as of 03 Jun 2024 11:05  Patient On (Oxygen Delivery Method): room air          GEN: NAD,   HEENT: normocephalic and atraumatic. EOMI. KRISTEL.    NECK: Supple. No carotid bruits.  No lymphadenopathy or thyromegaly.  LUNGS: Clear to auscultation.  HEART: Regular rate and rhythm without murmur.  ABDOMEN: Soft, nontender, and nondistended.  Positive bowel sounds.    : No CVA tenderness  EXTREMITIES: Without any cyanosis, clubbing, rash, lesions or edema.  MSK: no joint swelling  NEUROLOGIC: Cranial nerves II through XII are grossly intact.  PSYCHIATRIC: Appropriate affect .  SKIN: No ulceration or induration present.        Labs:  Vitals:  ============  T(F): 97.9 (03 Jun 2024 11:05), Max: 98.5 (02 Jun 2024 20:35)  HR: 76 (03 Jun 2024 11:05)  BP: 102/57 (03 Jun 2024 11:05)  RR: 18 (03 Jun 2024 11:05)  SpO2: 93% (03 Jun 2024 11:05) (93% - 97%)  temp max in last 48H T(F): , Max: 98.5 (06-02-24 @ 20:35)    =======================================================  Current Antibiotics:  meropenem Injectable 1000 milliGRAM(s) IV Push every 12 hours    Other medications:  atorvastatin 10 milliGRAM(s) Oral at bedtime  dextrose 10% Bolus 125 milliLiter(s) IV Bolus once  dextrose 5%. 1000 milliLiter(s) IV Continuous <Continuous>  dextrose 5%. 1000 milliLiter(s) IV Continuous <Continuous>  dextrose 50% Injectable 25 Gram(s) IV Push once  dextrose 50% Injectable 12.5 Gram(s) IV Push once  glucagon  Injectable 1 milliGRAM(s) IntraMuscular once  insulin glargine Injectable (LANTUS) 3 Unit(s) SubCutaneous at bedtime  insulin glargine Injectable (LANTUS) 5 Unit(s) SubCutaneous every morning  insulin lispro (ADMELOG) corrective regimen sliding scale   SubCutaneous Before meals and at bedtime  metoprolol succinate ER 50 milliGRAM(s) Oral daily  pancrelipase  (CREON  6,000 Lipase Units) 1 Capsule(s) Oral three times a day with meals  pantoprazole    Tablet 40 milliGRAM(s) Oral before breakfast  saccharomyces boulardii 250 milliGRAM(s) Oral two times a day  sodium chloride 0.9%. 1000 milliLiter(s) IV Continuous <Continuous>  ursodiol Capsule 300 milliGRAM(s) Oral every 12 hours      =======================================================  Labs:                        9.7    7.02  )-----------( 201      ( 02 Jun 2024 05:43 )             28.6     06-03    138  |  92<L>  |  22.9<H>  ----------------------------<  107<H>  3.7   |  35.0<H>  |  0.59    Ca    9.2      03 Jun 2024 06:45  Phos  2.9     06-02  Mg     1.8     06-03    TPro  5.8<L>  /  Alb  3.1<L>  /  TBili  0.9  /  DBili  0.5<H>  /  AST  43<H>  /  ALT  145<H>  /  AlkPhos  151<H>  06-02      Culture - Blood (collected 06-01-24 @ 05:15)  Source: .Blood Blood  Preliminary Report (06-03-24 @ 13:03):    No growth at 48 Hours    Culture - Blood (collected 06-01-24 @ 05:02)  Source: .Blood Blood  Preliminary Report (06-03-24 @ 13:03):    No growth at 48 Hours    Culture - Urine (collected 05-29-24 @ 23:30)  Source: Clean Catch Clean Catch (Midstream)  Final Report (05-31-24 @ 06:24):    <10,000 CFU/mL Normal Urogenital Daly    Culture - Blood (collected 05-29-24 @ 23:15)  Source: .Blood Blood-Peripheral  Gram Stain (06-01-24 @ 02:43):    Growth in anaerobic bottle: Gram positive cocci in pairs    Growth in aerobic bottle: Gram Negative Rods  Final Report (06-03-24 @ 09:10):    Growth in anaerobic bottle: Enterococcus casseliflavus    Growth in aerobic bottle: Escherichia coli    Direct identification is available within approximately 3-5    hours either by Blood Panel Multiplexed PCR or Direct    MALDI-TOF. Details: [email or web address]  Organism: Escherichia coli (06-03-24 @ 14:13)    Sensitivities:      Method Type: DONITA      -  Ampicillin: S <=8 These ampicillin results predict results for amoxicillin      -  Ampicillin/Sulbactam: S <=4/2      -  Aztreonam: S <=4      -  Cefazolin: S <=2      -  Cefepime: S <=2      -  Cefoxitin: S <=8      -  Ceftriaxone: S <=1      -  Ciprofloxacin: S <=0.25      -  Ertapenem: S <=0.5      -  Gentamicin: S <=2      -  Imipenem: S <=1      -  Levofloxacin: S <=0.5      -  Meropenem: S <=1      -  Piperacillin/Tazobactam: S <=8      -  Tobramycin: S <=2      -  Trimethoprim/Sulfamethoxazole: S <=0.5/9.5  Organism: Enterococcus casseliflavus (06-03-24 @ 14:13)    Sensitivities:      Method Type: DONITA      -  Ampicillin: S <=2 Predicts results to ampicillin/sulbactam, amoxacillin-clavulanate and  piperacillin-tazobactam.      -  Vancomycin: R 4 E. casseliflavus/gallinarum have intrinsic, low level resistance to vancomycin. They are not considered to be VRE.      -  Gentamicin synergy: S <=500      -  Streptomycin synergy: S <=1000      -  Ciprofloxacin: S <=1  Organism: Blood Culture PCR (06-03-24 @ 14:13)    Sensitivities:      Method Type: PCR      -  Escherichia coli: Detec  Organism: Blood Culture PCR (06-03-24 @ 14:13)    Sensitivities:      Method Type: PCR      -  Blood PCR Panel: NEG  Organism: Blood Culture PCR  Blood Culture PCR  Enterococcus casseliflavus  Escherichia coli (06-03-24 @ 09:10)    Culture - Blood (collected 05-29-24 @ 23:10)  Source: .Blood Blood-Peripheral  Preliminary Report (06-03-24 @ 03:01):    No growth at 4 days      Creatinine: 0.59 mg/dL (06-03-24 @ 06:45)  Creatinine: 1.29 mg/dL (06-02-24 @ 05:43)  Creatinine: 1.57 mg/dL (06-01-24 @ 05:02)  Creatinine: 1.09 mg/dL (05-31-24 @ 04:40)  Creatinine: 1.13 mg/dL (05-29-24 @ 23:10)            WBC Count: 7.02 K/uL (06-02-24 @ 05:43)  WBC Count: 11.56 K/uL (06-01-24 @ 05:02)  WBC Count: 10.24 K/uL (05-31-24 @ 04:40)  WBC Count: 14.82 K/uL (05-29-24 @ 23:10)    SARS-CoV-2 Result: NotDetec (05-30-24 @ 00:56)      Alkaline Phosphatase: 151 U/L (06-02-24 @ 05:43)  Alkaline Phosphatase: 208 U/L (06-01-24 @ 05:02)  Alkaline Phosphatase: 192 U/L (05-31-24 @ 04:40)  Alanine Aminotransferase (ALT/SGPT): 145 U/L (06-02-24 @ 05:43)  Alanine Aminotransferase (ALT/SGPT): 253 U/L (06-01-24 @ 05:02)  Alanine Aminotransferase (ALT/SGPT): 340 U/L (05-31-24 @ 04:40)  Aspartate Aminotransferase (AST/SGOT): 43 U/L (06-02-24 @ 05:43)  Aspartate Aminotransferase (AST/SGOT): 124 U/L (06-01-24 @ 05:02)  Aspartate Aminotransferase (AST/SGOT): 220 U/L (05-31-24 @ 04:40)  Bilirubin Total: 0.9 mg/dL (06-02-24 @ 05:43)  Bilirubin Total: 3.2 mg/dL (06-01-24 @ 05:02)  Bilirubin Total: 3.6 mg/dL (05-31-24 @ 04:40)  Bilirubin Direct: 0.5 mg/dL (06-02-24 @ 05:43)  Bilirubin Direct: 2.2 mg/dL (06-01-24 @ 05:02)  
INTERVAL HPI/OVERNIGHT EVENTS:    CC: cholangitis, E coli bacteremia, choledocholithiasis, TIMOTHY    No overnight events  denies complaints  tolerating diet    Vital Signs Last 24 Hrs  T(C): 36.5 (02 Jun 2024 10:51), Max: 36.7 (01 Jun 2024 19:51)  T(F): 97.7 (02 Jun 2024 10:51), Max: 98 (01 Jun 2024 19:51)  HR: 69 (02 Jun 2024 10:51) (66 - 74)  BP: 146/70 (02 Jun 2024 10:51) (112/68 - 156/78)  BP(mean): --  RR: 18 (02 Jun 2024 10:51) (18 - 18)  SpO2: 96% (02 Jun 2024 10:51) (96% - 98%)    Parameters below as of 02 Jun 2024 10:51  Patient On (Oxygen Delivery Method): room air        PHYSICAL EXAM:    GENERAL: alert, not in distress  CHEST/LUNG: b/l air entry  HEART: reg  ABDOMEN: soft, bs+, non tender  EXTREMITIES:  no edema, tenderness    MEDICATIONS  (STANDING):  atorvastatin 10 milliGRAM(s) Oral at bedtime  dextrose 10% Bolus 125 milliLiter(s) IV Bolus once  dextrose 5%. 1000 milliLiter(s) (50 mL/Hr) IV Continuous <Continuous>  dextrose 5%. 1000 milliLiter(s) (100 mL/Hr) IV Continuous <Continuous>  dextrose 50% Injectable 25 Gram(s) IV Push once  dextrose 50% Injectable 12.5 Gram(s) IV Push once  glucagon  Injectable 1 milliGRAM(s) IntraMuscular once  insulin glargine Injectable (LANTUS) 3 Unit(s) SubCutaneous at bedtime  insulin glargine Injectable (LANTUS) 5 Unit(s) SubCutaneous every morning  insulin lispro (ADMELOG) corrective regimen sliding scale   SubCutaneous Before meals and at bedtime  meropenem Injectable 1000 milliGRAM(s) IV Push every 12 hours  metoprolol succinate ER 50 milliGRAM(s) Oral daily  pancrelipase  (CREON  6,000 Lipase Units) 1 Capsule(s) Oral three times a day with meals  pantoprazole    Tablet 40 milliGRAM(s) Oral before breakfast  saccharomyces boulardii 250 milliGRAM(s) Oral two times a day  sodium chloride 0.9%. 1000 milliLiter(s) (100 mL/Hr) IV Continuous <Continuous>    MEDICATIONS  (PRN):  acetaminophen     Tablet .. 650 milliGRAM(s) Oral every 6 hours PRN Temp greater or equal to 38C (100.4F), Mild Pain (1 - 3)  ALPRAZolam 0.25 milliGRAM(s) Oral at bedtime PRN anxiety/insomnia  aluminum hydroxide/magnesium hydroxide/simethicone Suspension 30 milliLiter(s) Oral every 4 hours PRN Dyspepsia  dextrose Oral Gel 15 Gram(s) Oral once PRN Blood Glucose LESS THAN 70 milliGRAM(s)/deciliter  melatonin 3 milliGRAM(s) Oral at bedtime PRN Insomnia  ondansetron Injectable 4 milliGRAM(s) IV Push every 8 hours PRN Nausea and/or Vomiting  oxyCODONE    IR 5 milliGRAM(s) Oral four times a day PRN Moderate Pain (4 - 6)      Allergies    penicillin (Anaphylaxis)  Keflex (Anaphylaxis)    Intolerances          LABS:                          9.7    7.02  )-----------( 201      ( 02 Jun 2024 05:43 )             28.6     06-02    139  |  102  |  22.4<H>  ----------------------------<  211<H>  3.7   |  24.0  |  1.29    Ca    8.6      02 Jun 2024 05:43  Phos  2.9     06-02  Mg     1.9     06-02    TPro  5.8<L>  /  Alb  3.1<L>  /  TBili  0.9  /  DBili  0.5<H>  /  AST  43<H>  /  ALT  145<H>  /  AlkPhos  151<H>  06-02      Urinalysis Basic - ( 02 Jun 2024 05:43 )    Color: x / Appearance: x / SG: x / pH: x  Gluc: 211 mg/dL / Ketone: x  / Bili: x / Urobili: x   Blood: x / Protein: x / Nitrite: x   Leuk Esterase: x / RBC: x / WBC x   Sq Epi: x / Non Sq Epi: x / Bacteria: x        RADIOLOGY & ADDITIONAL TESTS:  
Chief complaint: Choledocholithiasis    Patient seen and examined at bedside. No acute overnight events reported. No fever, chills, cough, nausea or vomiting.    Vital Signs Last 24 Hrs  T(F): 98.3 (31 May 2024 07:31), Max: 98.8 (30 May 2024 20:27)  HR: 68 (31 May 2024 07:31) (68 - 80)  BP: 134/62 (31 May 2024 07:31) (103/57 - 135/68)  RR: 18 (31 May 2024 07:31) (18 - 18)  SpO2: 95% (31 May 2024 07:31) (95% - 97%)    Physical Exam:  Constitutional: alert and oriented, in no acute distress   Neck: Soft and supple  Respiratory: Clear to auscultation bilaterally, no wheezes or crackles  Cardiovascular: Regular rate and rhyhtm, no murmurs, gallops, rubs  Gastrointestinal: Soft, non-tender to palpation, +bs  Vascular: 2+ peripheral pulses  Neurological: A/O x 3  Musculoskeletal: 5/5 strength b/l upper and lower extremities, no lower extremity edema bilaterally    Labs:                        9.5    10.24 )-----------( 192      ( 31 May 2024 04:40 )             28.1   05-31    137  |  98  |  15.7  ----------------------------<  170<H>  3.7   |  27.0  |  1.09    Ca    9.3      31 May 2024 04:40    TPro  6.1<L>  /  Alb  3.2<L>  /  TBili  3.6<H>  /  DBili  x   /  AST  220<H>  /  ALT  340<H>  /  AlkPhos  192<H>  05-31  
INTERVAL HPI/OVERNIGHT EVENTS:    CC: cholangitis, E coli bacteremia, choledocholithiasis, TIMOTHY    Chart and course reviewed  feels better  tolerating diet  pain better    Vital Signs Last 24 Hrs  T(C): 37.1 (01 Jun 2024 10:06), Max: 37.1 (01 Jun 2024 10:06)  T(F): 98.7 (01 Jun 2024 10:06), Max: 98.7 (01 Jun 2024 10:06)  HR: 80 (01 Jun 2024 10:06) (69 - 80)  BP: 161/71 (01 Jun 2024 10:06) (128/71 - 161/71)  BP(mean): --  RR: 18 (01 Jun 2024 10:06) (17 - 18)  SpO2: 95% (01 Jun 2024 10:06) (94% - 98%)    Parameters below as of 01 Jun 2024 10:06  Patient On (Oxygen Delivery Method): room air        PHYSICAL EXAM:    GENERAL: alert, not in distress  CHEST/LUNG: b/l air entry  HEART: reg  ABDOMEN: soft, bs+, non tender  EXTREMITIES:  no edema, tenderness    MEDICATIONS  (STANDING):  atorvastatin 10 milliGRAM(s) Oral at bedtime  dextrose 10% Bolus 125 milliLiter(s) IV Bolus once  dextrose 5%. 1000 milliLiter(s) (50 mL/Hr) IV Continuous <Continuous>  dextrose 5%. 1000 milliLiter(s) (100 mL/Hr) IV Continuous <Continuous>  dextrose 50% Injectable 25 Gram(s) IV Push once  dextrose 50% Injectable 12.5 Gram(s) IV Push once  glucagon  Injectable 1 milliGRAM(s) IntraMuscular once  insulin glargine Injectable (LANTUS) 3 Unit(s) SubCutaneous every morning  insulin glargine Injectable (LANTUS) 3 Unit(s) SubCutaneous at bedtime  insulin lispro (ADMELOG) corrective regimen sliding scale   SubCutaneous Before meals and at bedtime  meropenem  IVPB      metoprolol succinate ER 50 milliGRAM(s) Oral daily  pancrelipase  (CREON  6,000 Lipase Units) 1 Capsule(s) Oral three times a day with meals  pantoprazole    Tablet 40 milliGRAM(s) Oral before breakfast  saccharomyces boulardii 250 milliGRAM(s) Oral two times a day  sodium chloride 0.9%. 1000 milliLiter(s) (100 mL/Hr) IV Continuous <Continuous>    MEDICATIONS  (PRN):  acetaminophen     Tablet .. 650 milliGRAM(s) Oral every 6 hours PRN Temp greater or equal to 38C (100.4F), Mild Pain (1 - 3)  ALPRAZolam 0.25 milliGRAM(s) Oral at bedtime PRN anxiety/insomnia  aluminum hydroxide/magnesium hydroxide/simethicone Suspension 30 milliLiter(s) Oral every 4 hours PRN Dyspepsia  dextrose Oral Gel 15 Gram(s) Oral once PRN Blood Glucose LESS THAN 70 milliGRAM(s)/deciliter  melatonin 3 milliGRAM(s) Oral at bedtime PRN Insomnia  ondansetron Injectable 4 milliGRAM(s) IV Push every 8 hours PRN Nausea and/or Vomiting  oxyCODONE    IR 5 milliGRAM(s) Oral four times a day PRN Moderate Pain (4 - 6)      Allergies    penicillin (Anaphylaxis)  Keflex (Anaphylaxis)    Intolerances          LABS:                          11.2   11.56 )-----------( 220      ( 01 Jun 2024 05:02 )             32.8     06-01    137  |  97  |  23.6<H>  ----------------------------<  202<H>  4.7   |  24.0  |  1.57<H>    Ca    9.7      01 Jun 2024 05:02    TPro  6.7  /  Alb  3.5  /  TBili  3.2<H>  /  DBili  2.2<H>  /  AST  124<H>  /  ALT  253<H>  /  AlkPhos  208<H>  06-01    PT/INR - ( 31 May 2024 04:40 )   PT: 11.4 sec;   INR: 1.03 ratio           Urinalysis Basic - ( 01 Jun 2024 05:02 )    Color: x / Appearance: x / SG: x / pH: x  Gluc: 202 mg/dL / Ketone: x  / Bili: x / Urobili: x   Blood: x / Protein: x / Nitrite: x   Leuk Esterase: x / RBC: x / WBC x   Sq Epi: x / Non Sq Epi: x / Bacteria: x        RADIOLOGY & ADDITIONAL TESTS:

## 2024-06-04 VITALS
DIASTOLIC BLOOD PRESSURE: 84 MMHG | SYSTOLIC BLOOD PRESSURE: 164 MMHG | HEART RATE: 87 BPM | RESPIRATION RATE: 18 BRPM | OXYGEN SATURATION: 94 % | TEMPERATURE: 98 F

## 2024-06-04 LAB
CULTURE RESULTS: SIGNIFICANT CHANGE UP
SPECIMEN SOURCE: SIGNIFICANT CHANGE UP

## 2024-06-06 LAB
CULTURE RESULTS: SIGNIFICANT CHANGE UP
CULTURE RESULTS: SIGNIFICANT CHANGE UP
SPECIMEN SOURCE: SIGNIFICANT CHANGE UP
SPECIMEN SOURCE: SIGNIFICANT CHANGE UP

## 2024-06-07 NOTE — CHART NOTE - NSCHARTNOTEFT_GEN_A_CORE
Notified by RN pt with BC result: Growth in anaerobic bottle: gram+ cocci in pairs/growth in aerobic bottle: gram- rods. Pt on Zosyn IVPB, continue. Repeat BC X2 STAT.
Post-Discharge Medication Review	    Patient's preferred pharmacy was updated in OMR: [facility]  	  Caregiver (Cristobal, Patient's ) contacted to offer medication counseling post-discharge. Medication reconciliation completed.     Per Caregiver, medications include:	  1.	ALPRAZolam 0.25 mg oral tablet 1 tab(s) orally once a day (at bedtime), As Needed  2.	brimonidine 0.2% ophthalmic solution 1 drop(s) to each affected eye 2 times a day  3.	ciprofloxacin 500 mg oral tablet 1 tab(s) orally 2 times a day  4.	Florastor 250 mg oral capsule 1 cap(s) orally 2 times a day  5.	latanoprost 0.005% ophthalmic solution 1 drop(s) to each affected eye once a day (in the evening)  6.	Levemir 100 units/mL subcutaneous solution 7 unit(s) subcutaneous once a day 7 units in the morning and 5 units at night  7.	Lipitor 10 mg oral tablet 1 tab(s) orally once a day  8.	oxyCODONE 5 mg oral tablet 1 tab(s) orally 4 times a day, As Needed  9.	pancrelipase 6000 units-19,000 units-30,000 units oral delayed release capsule 1 cap(s) orally 3 times a day (with meals)  10.	Protonix 40 mg oral delayed release tablet 1 tab(s) orally 2 times a day  11.	Restasis 0.05% ophthalmic emulsion 1 drop(s) to each affected eye every 12 hours  12.	Toprol-XL 50 mg oral tablet, extended release 1 tab(s) orally once a day  13.	ursodiol 300 mg oral capsule 1 cap(s) orally every 12 hours    Medications added to OMR (updated per discussion with Caregiver):	  14.	MetFORMIN (Eqv-Fortamet) 500 mg oral tablet, extended release: 1 tab(s) orally once a day  15.	ramipril 5 mg oral tablet: 1 tab(s) orally once a day    Medications removed from OMR (updated per discussion with Caregiver):	  1.	metFORMIN 500 mg oral tablet 1 tab(s) orally once a day 	     Medication name, indication, administration, side effects, and monitoring reviewed for new medications during post discharge counseling visit with Caregiver. Caregiver demonstrated understanding. Counseling offered for all medications.	    The below was discussed during visit:  - Discussed Narcan use; patient's  aware and plans to purchase OTC  - Patient's  planning on discussing pancrelipase dose/use with outpatient provider  - Patient's  planning on discussing pantoprazole use with outpatient provider  - Patient's  stated patient used to take Centrum multivitamin, however, is not currently taking and will discuss use with outpatient provide prior to restarting  - Patient's  said instructions were provided to him from nurse/provider for patient to resume ramipril a few days after discharge    Donna Garcia, Jessica  Clinical Pharmacy Specialist, Pharmacy Telehealth Team  Can be reached via MS Teams or [phone]

## 2024-06-12 RX ORDER — RAMIPRIL 5 MG
1 CAPSULE ORAL
Refills: 0 | DISCHARGE

## 2024-06-12 RX ORDER — LATANOPROST 0.05 MG/ML
1 SOLUTION/ DROPS OPHTHALMIC; TOPICAL
Qty: 0 | Refills: 0 | DISCHARGE

## 2024-06-12 RX ORDER — CYCLOSPORINE 0.5 MG/ML
1 EMULSION OPHTHALMIC
Qty: 0 | Refills: 0 | DISCHARGE

## 2024-06-12 RX ORDER — ALPRAZOLAM 0.25 MG
1 TABLET ORAL
Qty: 0 | Refills: 0 | DISCHARGE

## 2024-06-12 RX ORDER — BRIMONIDINE TARTRATE 2 MG/MG
1 SOLUTION/ DROPS OPHTHALMIC
Qty: 0 | Refills: 0 | DISCHARGE

## 2024-06-12 RX ORDER — OXYCODONE HYDROCHLORIDE 5 MG/1
1 TABLET ORAL
Qty: 0 | Refills: 0 | DISCHARGE

## 2024-06-12 RX ORDER — METFORMIN HYDROCHLORIDE 850 MG/1
1 TABLET ORAL
Refills: 0 | DISCHARGE

## 2024-06-12 RX ORDER — METOPROLOL TARTRATE 50 MG
1 TABLET ORAL
Refills: 0 | DISCHARGE

## 2024-06-12 RX ORDER — ATORVASTATIN CALCIUM 80 MG/1
1 TABLET, FILM COATED ORAL
Refills: 0 | DISCHARGE

## 2024-06-12 RX ORDER — INSULIN DETEMIR 100/ML (3)
7 INSULIN PEN (ML) SUBCUTANEOUS
Qty: 0 | Refills: 0 | DISCHARGE

## 2024-09-06 ENCOUNTER — APPOINTMENT (OUTPATIENT)
Dept: GASTROENTEROLOGY | Facility: CLINIC | Age: 74
End: 2024-09-06

## 2024-09-06 VITALS
OXYGEN SATURATION: 97 % | SYSTOLIC BLOOD PRESSURE: 147 MMHG | WEIGHT: 143 LBS | DIASTOLIC BLOOD PRESSURE: 78 MMHG | HEIGHT: 61 IN | BODY MASS INDEX: 27 KG/M2 | RESPIRATION RATE: 14 BRPM | TEMPERATURE: 97.8 F | HEART RATE: 76 BPM

## 2024-09-06 DIAGNOSIS — K80.50 CALCULUS OF BILE DUCT W/OUT CHOLANGITIS OR CHOLECYSTITIS W/OUT OBSTRUCTION: ICD-10-CM

## 2024-09-06 DIAGNOSIS — Z98.890 OTHER SPECIFIED POSTPROCEDURAL STATES: ICD-10-CM

## 2024-09-06 DIAGNOSIS — Z09 ENCOUNTER FOR FOLLOW-UP EXAMINATION AFTER COMPLETED TREATMENT FOR CONDITIONS OTHER THAN MALIGNANT NEOPLASM: ICD-10-CM

## 2024-09-06 PROCEDURE — G2211 COMPLEX E/M VISIT ADD ON: CPT

## 2024-09-06 PROCEDURE — 99213 OFFICE O/P EST LOW 20 MIN: CPT

## 2024-09-06 RX ORDER — URSODIOL 300 MG/1
300 CAPSULE ORAL
Qty: 60 | Refills: 3 | Status: ACTIVE | COMMUNITY
Start: 2024-09-06 | End: 1900-01-01

## 2024-09-06 RX ORDER — INSULIN GLARGINE 100 [IU]/ML
100 INJECTION, SOLUTION SUBCUTANEOUS
Refills: 0 | Status: ACTIVE | COMMUNITY

## 2024-09-06 NOTE — PHYSICAL EXAM
[Alert] : alert [Normal Voice/Communication] : normal voice/communication [Healthy Appearing] : healthy appearing [No Acute Distress] : no acute distress [Sclera] : the sclera and conjunctiva were normal [Hearing Threshold Finger Rub Not [place]] : hearing was normal [Normal Lips/Gums] : the lips and gums were normal [Oropharynx] : the oropharynx was normal [Normal Appearance] : the appearance of the neck was normal [No Neck Mass] : no neck mass was observed [No Respiratory Distress] : no respiratory distress [No Acc Muscle Use] : no accessory muscle use [Respiration, Rhythm And Depth] : normal respiratory rhythm and effort [Auscultation Breath Sounds / Voice Sounds] : lungs were clear to auscultation bilaterally [Heart Rate And Rhythm] : heart rate was normal and rhythm regular [Normal S1, S2] : normal S1 and S2 [Murmurs] : no murmurs [Bowel Sounds] : normal bowel sounds [Abdomen Tenderness] : non-tender [No Masses] : no abdominal mass palpated [Abdomen Soft] : soft [] : no hepatosplenomegaly [Oriented To Time, Place, And Person] : oriented to person, place, and time

## 2024-09-06 NOTE — PHYSICAL EXAM
[Alert] : alert [Normal Voice/Communication] : normal voice/communication [Healthy Appearing] : healthy appearing [No Acute Distress] : no acute distress [Hearing Threshold Finger Rub Not [place]] : hearing was normal [Sclera] : the sclera and conjunctiva were normal [Normal Lips/Gums] : the lips and gums were normal [Oropharynx] : the oropharynx was normal [Normal Appearance] : the appearance of the neck was normal [No Neck Mass] : no neck mass was observed [No Respiratory Distress] : no respiratory distress [No Acc Muscle Use] : no accessory muscle use [Respiration, Rhythm And Depth] : normal respiratory rhythm and effort [Auscultation Breath Sounds / Voice Sounds] : lungs were clear to auscultation bilaterally [Heart Rate And Rhythm] : heart rate was normal and rhythm regular [Normal S1, S2] : normal S1 and S2 [Murmurs] : no murmurs [Bowel Sounds] : normal bowel sounds [Abdomen Tenderness] : non-tender [No Masses] : no abdominal mass palpated [Abdomen Soft] : soft [] : no hepatosplenomegaly [Oriented To Time, Place, And Person] : oriented to person, place, and time

## 2024-09-13 NOTE — HISTORY OF PRESENT ILLNESS
[FreeTextEntry1] : The patient has presented for a follow-up.  Patient has been evaluated in the past for recurrent pancreatitis and has undergone EUS ERCP.  She was recently admitted to the hospital for reflux, nausea vomiting and fever.Noted to have elevated WBC, LFTs and CTabdomen with intra hepatic and CBD dilatation. ID and GI were consulted,empiric Zosyn was initiated and ERCP was scheduled. Cultures sent on admission.She underwent ERCP on 5/31, biliary stones were removed. Post procedure, her diet was advanced.  She is doing very well at this time.  She has no complaints currently.  She is accompanied by her .

## 2024-09-13 NOTE — ASSESSMENT
[FreeTextEntry1] : I have discussed at length with him regarding the etiology of recurrent biliary stone.  Will start her on ursodiol on a regular basis and then follow-up in the office.  At this time there is no need for any repeat ERCP on further evaluation

## 2024-09-13 NOTE — REASON FOR VISIT
[Post Hospitalization] : a post hospitalization visit [Follow-up] : a follow-up of an existing diagnosis

## 2024-11-12 ENCOUNTER — OUTPATIENT (OUTPATIENT)
Dept: OUTPATIENT SERVICES | Facility: HOSPITAL | Age: 74
LOS: 1 days | End: 2024-11-12
Payer: MEDICARE

## 2024-11-12 ENCOUNTER — APPOINTMENT (OUTPATIENT)
Dept: MAMMOGRAPHY | Facility: CLINIC | Age: 74
End: 2024-11-12
Payer: MEDICARE

## 2024-11-12 DIAGNOSIS — Z98.89 OTHER SPECIFIED POSTPROCEDURAL STATES: Chronic | ICD-10-CM

## 2024-11-12 DIAGNOSIS — Z12.39 ENCOUNTER FOR OTHER SCREENING FOR MALIGNANT NEOPLASM OF BREAST: ICD-10-CM

## 2024-11-12 DIAGNOSIS — Z98.49 CATARACT EXTRACTION STATUS, UNSPECIFIED EYE: Chronic | ICD-10-CM

## 2024-11-12 DIAGNOSIS — Z90.49 ACQUIRED ABSENCE OF OTHER SPECIFIED PARTS OF DIGESTIVE TRACT: Chronic | ICD-10-CM

## 2024-11-12 PROCEDURE — 77067 SCR MAMMO BI INCL CAD: CPT | Mod: 26

## 2024-11-12 PROCEDURE — 77063 BREAST TOMOSYNTHESIS BI: CPT | Mod: 26

## 2024-11-20 PROCEDURE — 77067 SCR MAMMO BI INCL CAD: CPT

## 2024-11-20 PROCEDURE — 77063 BREAST TOMOSYNTHESIS BI: CPT

## 2025-09-18 ENCOUNTER — APPOINTMENT (OUTPATIENT)
Dept: ORTHOPEDIC SURGERY | Facility: CLINIC | Age: 75
End: 2025-09-18
Payer: MEDICARE

## 2025-09-18 VITALS
DIASTOLIC BLOOD PRESSURE: 75 MMHG | HEIGHT: 61 IN | HEART RATE: 71 BPM | SYSTOLIC BLOOD PRESSURE: 125 MMHG | BODY MASS INDEX: 27 KG/M2 | WEIGHT: 143 LBS

## 2025-09-18 DIAGNOSIS — M47.816 SPONDYLOSIS W/OUT MYELOPATHY OR RADICULOPATHY, LUMBAR REGION: ICD-10-CM

## 2025-09-18 DIAGNOSIS — M70.61 TROCHANTERIC BURSITIS, RIGHT HIP: ICD-10-CM

## 2025-09-18 DIAGNOSIS — M67.951 UNSPECIFIED DISORDER OF SYNOVIUM AND TENDON, RIGHT THIGH: ICD-10-CM

## 2025-09-18 PROCEDURE — 99204 OFFICE O/P NEW MOD 45 MIN: CPT | Mod: 25

## 2025-09-18 PROCEDURE — 72110 X-RAY EXAM L-2 SPINE 4/>VWS: CPT

## 2025-09-18 PROCEDURE — 20610 DRAIN/INJ JOINT/BURSA W/O US: CPT | Mod: RT

## 2025-09-18 RX ORDER — MELOXICAM 15 MG/1
15 TABLET ORAL
Qty: 1 | Refills: 0 | Status: ACTIVE | COMMUNITY
Start: 2025-09-18 | End: 1900-01-01

## (undated) DEVICE — SSH-ERBE RM1 11351341: Type: DURABLE MEDICAL EQUIPMENT

## (undated) DEVICE — PACK IV START WITH CHG

## (undated) DEVICE — SNARE MINI-MICRO OVAL

## (undated) DEVICE — SOL IRR BAG H2O 1000ML

## (undated) DEVICE — DVC CLEVERLOCK

## (undated) DEVICE — FORCEP RADIAL JAW 4 W NDL 2.4MM 2.8MM 240CM ORANGE DISP

## (undated) DEVICE — BITE BLOCK ADULT 20 X 27MM (GREEN)

## (undated) DEVICE — SENSOR O2 FINGER ADULT

## (undated) DEVICE — SYR SLIP 10CC

## (undated) DEVICE — SYR IV FLUSH SALINE 10ML 30/TY

## (undated) DEVICE — MASK PROCED EARLOOP 50/BX LRC COVID ADD

## (undated) DEVICE — TUBING IV EXTENSION MACRO W CLAVE 7"

## (undated) DEVICE — GOWN IMPERV XL

## (undated) DEVICE — SSH-EBUS GFUE160 AL51810834: Type: DURABLE MEDICAL EQUIPMENT

## (undated) DEVICE — SYR LUER SLIP TIP 50CC

## (undated) DEVICE — DRSG CURITY GAUZE SPONGE 4 X 4" 12-PLY NON-STERILE

## (undated) DEVICE — CATH IV SAFE BC 22G X 1" (BLUE)

## (undated) DEVICE — TUBING ALARIS PUMP MODULE NON-DEHP

## (undated) DEVICE — VENODYNE/SCD SLEEVE CALF MEDIUM

## (undated) DEVICE — SOL IRR BAG NS 0.9% 1000ML

## (undated) DEVICE — ELCTR GROUNDING PAD ADULT COVIDIEN

## (undated) DEVICE — FORCEP RESCUE COMBO

## (undated) DEVICE — SOL BAG NS 0.9% 1000ML

## (undated) DEVICE — VALVE ENDOSCOPE DEFENDO SINGLE USE

## (undated) DEVICE — DENTURE CUP PINK

## (undated) DEVICE — DRSG 2X2

## (undated) DEVICE — WARMING BLANKET FULL ADULT

## (undated) DEVICE — UNDERPAD LINEN SAVER 23 X 36"